# Patient Record
Sex: FEMALE | Race: WHITE | Employment: FULL TIME | URBAN - METROPOLITAN AREA
[De-identification: names, ages, dates, MRNs, and addresses within clinical notes are randomized per-mention and may not be internally consistent; named-entity substitution may affect disease eponyms.]

---

## 2017-04-18 ENCOUNTER — TELEPHONE (OUTPATIENT)
Dept: FAMILY MEDICINE CLINIC | Age: 52
End: 2017-04-18

## 2018-02-09 ENCOUNTER — OFFICE VISIT (OUTPATIENT)
Dept: FAMILY MEDICINE CLINIC | Age: 53
End: 2018-02-09

## 2018-02-09 VITALS
SYSTOLIC BLOOD PRESSURE: 108 MMHG | WEIGHT: 163.8 LBS | OXYGEN SATURATION: 96 % | BODY MASS INDEX: 30.95 KG/M2 | RESPIRATION RATE: 12 BRPM | DIASTOLIC BLOOD PRESSURE: 81 MMHG | HEART RATE: 90 BPM | TEMPERATURE: 97.1 F

## 2018-02-09 DIAGNOSIS — Z00.00 ROUTINE GENERAL MEDICAL EXAMINATION AT A HEALTH CARE FACILITY: ICD-10-CM

## 2018-02-09 DIAGNOSIS — F33.0 MAJOR DEPRESSIVE DISORDER, RECURRENT EPISODE, MILD (HCC): ICD-10-CM

## 2018-02-09 DIAGNOSIS — R13.10 DYSPHAGIA, UNSPECIFIED TYPE: ICD-10-CM

## 2018-02-09 DIAGNOSIS — Z12.11 SCREEN FOR COLON CANCER: ICD-10-CM

## 2018-02-09 DIAGNOSIS — J01.90 ACUTE BACTERIAL SINUSITIS: ICD-10-CM

## 2018-02-09 DIAGNOSIS — F90.0 ATTENTION DEFICIT HYPERACTIVITY DISORDER (ADHD), PREDOMINANTLY INATTENTIVE TYPE: Primary | ICD-10-CM

## 2018-02-09 DIAGNOSIS — B96.89 ACUTE BACTERIAL SINUSITIS: ICD-10-CM

## 2018-02-09 DIAGNOSIS — K21.9 GASTROESOPHAGEAL REFLUX DISEASE WITHOUT ESOPHAGITIS: ICD-10-CM

## 2018-02-09 DIAGNOSIS — N28.1 ACQUIRED CYST OF KIDNEY: ICD-10-CM

## 2018-02-09 DIAGNOSIS — Z12.39 SCREENING FOR BREAST CANCER: ICD-10-CM

## 2018-02-09 DIAGNOSIS — M51.36 DDD (DEGENERATIVE DISC DISEASE), LUMBAR: ICD-10-CM

## 2018-02-09 LAB
A/G RATIO: 1.8 (ref 1.1–2.2)
ALBUMIN SERPL-MCNC: 4.8 G/DL (ref 3.4–5)
ALP BLD-CCNC: 74 U/L (ref 40–129)
ALT SERPL-CCNC: 18 U/L (ref 10–40)
ANION GAP SERPL CALCULATED.3IONS-SCNC: 18 MMOL/L (ref 3–16)
AST SERPL-CCNC: 19 U/L (ref 15–37)
BILIRUB SERPL-MCNC: 0.5 MG/DL (ref 0–1)
BUN BLDV-MCNC: 16 MG/DL (ref 7–20)
CALCIUM SERPL-MCNC: 9.2 MG/DL (ref 8.3–10.6)
CHLORIDE BLD-SCNC: 99 MMOL/L (ref 99–110)
CHOLESTEROL, TOTAL: 202 MG/DL (ref 0–199)
CO2: 25 MMOL/L (ref 21–32)
CREAT SERPL-MCNC: 0.8 MG/DL (ref 0.6–1.1)
GFR AFRICAN AMERICAN: >60
GFR NON-AFRICAN AMERICAN: >60
GLOBULIN: 2.6 G/DL
GLUCOSE BLD-MCNC: 99 MG/DL (ref 70–99)
HCT VFR BLD CALC: 40.8 % (ref 36–48)
HDLC SERPL-MCNC: 63 MG/DL (ref 40–60)
HEMOGLOBIN: 13.5 G/DL (ref 12–16)
LDL CHOLESTEROL CALCULATED: 115 MG/DL
MCH RBC QN AUTO: 29.6 PG (ref 26–34)
MCHC RBC AUTO-ENTMCNC: 33 G/DL (ref 31–36)
MCV RBC AUTO: 89.6 FL (ref 80–100)
PDW BLD-RTO: 14.1 % (ref 12.4–15.4)
PLATELET # BLD: 250 K/UL (ref 135–450)
PMV BLD AUTO: 9.2 FL (ref 5–10.5)
POTASSIUM SERPL-SCNC: 4.1 MMOL/L (ref 3.5–5.1)
RBC # BLD: 4.55 M/UL (ref 4–5.2)
SODIUM BLD-SCNC: 142 MMOL/L (ref 136–145)
TOTAL PROTEIN: 7.4 G/DL (ref 6.4–8.2)
TRIGL SERPL-MCNC: 119 MG/DL (ref 0–150)
VLDLC SERPL CALC-MCNC: 24 MG/DL
WBC # BLD: 8.3 K/UL (ref 4–11)

## 2018-02-09 PROCEDURE — G8484 FLU IMMUNIZE NO ADMIN: HCPCS | Performed by: FAMILY MEDICINE

## 2018-02-09 PROCEDURE — 99214 OFFICE O/P EST MOD 30 MIN: CPT | Performed by: FAMILY MEDICINE

## 2018-02-09 PROCEDURE — G8427 DOCREV CUR MEDS BY ELIG CLIN: HCPCS | Performed by: FAMILY MEDICINE

## 2018-02-09 PROCEDURE — 3017F COLORECTAL CA SCREEN DOC REV: CPT | Performed by: FAMILY MEDICINE

## 2018-02-09 PROCEDURE — 1036F TOBACCO NON-USER: CPT | Performed by: FAMILY MEDICINE

## 2018-02-09 PROCEDURE — G8417 CALC BMI ABV UP PARAM F/U: HCPCS | Performed by: FAMILY MEDICINE

## 2018-02-09 PROCEDURE — 3014F SCREEN MAMMO DOC REV: CPT | Performed by: FAMILY MEDICINE

## 2018-02-09 RX ORDER — DOXYCYCLINE HYCLATE 100 MG
100 TABLET ORAL 2 TIMES DAILY
Qty: 20 TABLET | Refills: 0 | Status: SHIPPED | OUTPATIENT
Start: 2018-02-09 | End: 2018-02-19

## 2018-02-09 RX ORDER — DEXTROAMPHETAMINE SACCHARATE, AMPHETAMINE ASPARTATE MONOHYDRATE, DEXTROAMPHETAMINE SULFATE AND AMPHETAMINE SULFATE 7.5; 7.5; 7.5; 7.5 MG/1; MG/1; MG/1; MG/1
60 CAPSULE, EXTENDED RELEASE ORAL EVERY MORNING
Qty: 60 CAPSULE | Refills: 0 | Status: SHIPPED | OUTPATIENT
Start: 2018-02-09 | End: 2018-03-09 | Stop reason: SDUPTHER

## 2018-02-09 ASSESSMENT — PATIENT HEALTH QUESTIONNAIRE - PHQ9
SUM OF ALL RESPONSES TO PHQ QUESTIONS 1-9: 0
SUM OF ALL RESPONSES TO PHQ9 QUESTIONS 1 & 2: 0
2. FEELING DOWN, DEPRESSED OR HOPELESS: 0
1. LITTLE INTEREST OR PLEASURE IN DOING THINGS: 0

## 2018-02-09 NOTE — PROGRESS NOTES
Here for f/u, pt states that she has been feeling well overall. Pt has been off all her medications for about 1 year, due to lack of insurance. Pt has noted that being off meds, has not had any issues with mood being off meds. Pt states that with being in new job, stress levels are down and feels mood is doing great. Pt has noted that her ADD issues are significantly elevated off meds, and would like to look at options to resume therapy. Pt did do well with meds when she was on them, and would like to resume. Energy level is ok. No issues of chest pain, shortness of breath. Pt did go to ER a few weeks ago due to URI sx, bronchitis. Pt was evaluated and had full evaluation, was given breathing tx x 2. Did have CXR and CT chest that was normal.  Pt was given rx for prednisone taper, and zithromax. Pt only took zithromax and did not take steroids. Pt does feel better overall but with some mild persistent nasal congestion  No fever, and no sore throat. Breathing ok. No wheezing. Pt taking nothing except some over the counter mucinex. Pt does feel lung sx are better but with persistent nasal congestion, sinus congestion and PND. No fever, chills. 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 Pt really enjoys her job    Pt does have GERD sx, does use zantac and does find helpful. Pt denies any true dysphagia, but does have emesis with brusing teeth. Appetite ok, and bowels are normal.  No urinary sx. No fever, chills. No n/v. Except as noted above in the history of present illness, the review of systems is  negative for headache, vision changes, chest pain, shortness of breath, abdominal pain, urinary sx, bowel changes.     Past medical, surgical, and social history reviewed and updated  Medications and allergies reviewed and updated         O: /81   Pulse 90   Temp 97.1 °F (36.2 °C)   Resp 12   Wt 163 lb 12.8 oz (74.3 kg)   LMP meds  Monitor with symptomatic tx, and with tx ADD  F/u breakthru sx    4. Dysphagia, unspecified type  Exam nonfocal, sx intermittent  Will have pt set up referral to GI for eval, needs colonoscopy and recommend getting EGD at the same time  F/u increased sx and will consider swallow study  - [facility] - Heather Ferrer MD ([facility]); Future    5. Screen for colon cancer  Due colonoscopy  Agreeable to set up  [facility]    6. Acquired cyst of kidney  asymptomatic    7. Gastroesophageal reflux disease without esophagitis  Cont prn zantac and f/u increased sx  Await GI referra    8. Screening for breast cancer  - HEAVEN DIGITAL SCREEN W CAD BILATERAL; Future    9. Acute bacterial sinusitis  Doxy 100mg BID X 10d  Cont over the counter/symptomatic treatment. Follow up for persistent symptoms in 7 to 10 days or sooner for worsening symptomatology     10. Routine general medical examination at a health care facility  - CBC; Future  - Comprehensive Metabolic Panel; Future  - Lipid Panel; Future  - TSH without Reflex; Future           Follow-up appointment:   4wks/prn    Discussed use, benefit, and side effects of all prescribed medications. Barriers to medication compliance addressed. All patient questions answered. Pt voiced understanding. When applicable, patient's outside records were reviewed [facility]. The patient has signed appropriate paperworks/consents. Dragon dictation software was used for parts of this progress note. All attempts were made to correct any errors and ensure accuracy.

## 2018-02-10 LAB — TSH SERPL DL<=0.05 MIU/L-ACNC: 3.22 UIU/ML (ref 0.27–4.2)

## 2018-03-09 ENCOUNTER — OFFICE VISIT (OUTPATIENT)
Dept: FAMILY MEDICINE CLINIC | Age: 53
End: 2018-03-09

## 2018-03-09 VITALS
OXYGEN SATURATION: 95 % | TEMPERATURE: 97.9 F | DIASTOLIC BLOOD PRESSURE: 70 MMHG | WEIGHT: 166 LBS | SYSTOLIC BLOOD PRESSURE: 109 MMHG | RESPIRATION RATE: 20 BRPM | BODY MASS INDEX: 31.37 KG/M2 | HEART RATE: 84 BPM

## 2018-03-09 DIAGNOSIS — F90.0 ATTENTION DEFICIT HYPERACTIVITY DISORDER (ADHD), PREDOMINANTLY INATTENTIVE TYPE: ICD-10-CM

## 2018-03-09 DIAGNOSIS — Z12.11 SCREEN FOR COLON CANCER: ICD-10-CM

## 2018-03-09 DIAGNOSIS — R10.31 RIGHT INGUINAL PAIN: ICD-10-CM

## 2018-03-09 DIAGNOSIS — F33.0 MAJOR DEPRESSIVE DISORDER, RECURRENT EPISODE, MILD (HCC): Primary | ICD-10-CM

## 2018-03-09 DIAGNOSIS — M51.36 DDD (DEGENERATIVE DISC DISEASE), LUMBAR: ICD-10-CM

## 2018-03-09 DIAGNOSIS — Z12.39 SCREENING FOR BREAST CANCER: ICD-10-CM

## 2018-03-09 PROCEDURE — 99214 OFFICE O/P EST MOD 30 MIN: CPT | Performed by: FAMILY MEDICINE

## 2018-03-09 PROCEDURE — 3014F SCREEN MAMMO DOC REV: CPT | Performed by: FAMILY MEDICINE

## 2018-03-09 PROCEDURE — 1036F TOBACCO NON-USER: CPT | Performed by: FAMILY MEDICINE

## 2018-03-09 PROCEDURE — G8484 FLU IMMUNIZE NO ADMIN: HCPCS | Performed by: FAMILY MEDICINE

## 2018-03-09 PROCEDURE — 3017F COLORECTAL CA SCREEN DOC REV: CPT | Performed by: FAMILY MEDICINE

## 2018-03-09 PROCEDURE — G8427 DOCREV CUR MEDS BY ELIG CLIN: HCPCS | Performed by: FAMILY MEDICINE

## 2018-03-09 PROCEDURE — G8417 CALC BMI ABV UP PARAM F/U: HCPCS | Performed by: FAMILY MEDICINE

## 2018-03-09 RX ORDER — DEXTROAMPHETAMINE SACCHARATE, AMPHETAMINE ASPARTATE MONOHYDRATE, DEXTROAMPHETAMINE SULFATE AND AMPHETAMINE SULFATE 7.5; 7.5; 7.5; 7.5 MG/1; MG/1; MG/1; MG/1
60 CAPSULE, EXTENDED RELEASE ORAL EVERY MORNING
Qty: 60 CAPSULE | Refills: 0 | Status: SHIPPED | OUTPATIENT
Start: 2018-05-08 | End: 2018-06-21 | Stop reason: SDUPTHER

## 2018-03-09 RX ORDER — DEXTROAMPHETAMINE SACCHARATE, AMPHETAMINE ASPARTATE MONOHYDRATE, DEXTROAMPHETAMINE SULFATE AND AMPHETAMINE SULFATE 7.5; 7.5; 7.5; 7.5 MG/1; MG/1; MG/1; MG/1
60 CAPSULE, EXTENDED RELEASE ORAL EVERY MORNING
Qty: 60 CAPSULE | Refills: 0 | Status: SHIPPED | OUTPATIENT
Start: 2018-04-08 | End: 2018-06-21 | Stop reason: SDUPTHER

## 2018-03-09 RX ORDER — DEXTROAMPHETAMINE SACCHARATE, AMPHETAMINE ASPARTATE MONOHYDRATE, DEXTROAMPHETAMINE SULFATE AND AMPHETAMINE SULFATE 7.5; 7.5; 7.5; 7.5 MG/1; MG/1; MG/1; MG/1
60 CAPSULE, EXTENDED RELEASE ORAL EVERY MORNING
Qty: 60 CAPSULE | Refills: 0 | Status: SHIPPED | OUTPATIENT
Start: 2018-03-09 | End: 2018-06-21 | Stop reason: SDUPTHER

## 2018-03-09 RX ORDER — HYDROCODONE BITARTRATE AND ACETAMINOPHEN 7.5; 325 MG/1; MG/1
1 TABLET ORAL EVERY 6 HOURS PRN
Qty: 25 TABLET | Refills: 0 | Status: SHIPPED | OUTPATIENT
Start: 2018-03-09 | End: 2018-04-08

## 2018-03-09 RX ORDER — HYDROCODONE BITARTRATE AND ACETAMINOPHEN 7.5; 325 MG/1; MG/1
1 TABLET ORAL EVERY 6 HOURS PRN
Qty: 60 TABLET | Refills: 0 | Status: SHIPPED | OUTPATIENT
Start: 2018-03-09 | End: 2018-03-09 | Stop reason: SDUPTHER

## 2018-03-09 NOTE — PROGRESS NOTES
cancer  Has order for mammo, agreeable to get set up    5. Screen for colon cancer  Due colonoscopy  Working to get set up    6. Right inguinal pain  Exam nonfocal, mild lower abd pain  S/p FABIO/BSO, bowels normal  Await results of colonoscopy  Suspect muscular             Follow-up appointment:   3 mos/prn    Discussed use, benefit, and side effects of all prescribed medications. Barriers to medication compliance addressed. All patient questions answered. Pt voiced understanding. When applicable, patient's outside records were reviewed through [facility]. The patient has signed appropriate paperworks/consents. Dragon dictation software was used for parts of this progress note. All attempts were made to correct any errors and ensure accuracy.

## 2018-04-23 ENCOUNTER — HOSPITAL ENCOUNTER (OUTPATIENT)
Dept: MRI IMAGING | Age: 53
Discharge: HOME | End: 2018-04-23
Attending: FAMILY MEDICINE | Admitting: FAMILY MEDICINE

## 2018-04-23 DIAGNOSIS — M51.36 OTHER INTERVERTEBRAL DISC DEGENERATION, LUMBAR REGION: ICD-10-CM

## 2018-04-23 DIAGNOSIS — M51.36 DDD (DEGENERATIVE DISC DISEASE), LUMBAR: ICD-10-CM

## 2018-04-27 ENCOUNTER — TELEPHONE (OUTPATIENT)
Dept: FAMILY MEDICINE CLINIC | Age: 53
End: 2018-04-27

## 2018-05-02 ENCOUNTER — OFFICE VISIT (OUTPATIENT)
Dept: FAMILY MEDICINE CLINIC | Age: 53
End: 2018-05-02

## 2018-05-02 VITALS
WEIGHT: 161.4 LBS | DIASTOLIC BLOOD PRESSURE: 82 MMHG | HEART RATE: 97 BPM | SYSTOLIC BLOOD PRESSURE: 136 MMHG | OXYGEN SATURATION: 97 % | BODY MASS INDEX: 30.5 KG/M2 | TEMPERATURE: 99.2 F | RESPIRATION RATE: 16 BRPM

## 2018-05-02 DIAGNOSIS — R03.0 ELEVATED BLOOD PRESSURE READING: ICD-10-CM

## 2018-05-02 DIAGNOSIS — L65.9 ALOPECIA: ICD-10-CM

## 2018-05-02 DIAGNOSIS — K52.9 COLITIS, ACUTE: Primary | ICD-10-CM

## 2018-05-02 DIAGNOSIS — K92.1 HEMATOCHEZIA: ICD-10-CM

## 2018-05-02 DIAGNOSIS — M51.36 DDD (DEGENERATIVE DISC DISEASE), LUMBAR: ICD-10-CM

## 2018-05-02 PROCEDURE — 3017F COLORECTAL CA SCREEN DOC REV: CPT | Performed by: FAMILY MEDICINE

## 2018-05-02 PROCEDURE — G8427 DOCREV CUR MEDS BY ELIG CLIN: HCPCS | Performed by: FAMILY MEDICINE

## 2018-05-02 PROCEDURE — G8417 CALC BMI ABV UP PARAM F/U: HCPCS | Performed by: FAMILY MEDICINE

## 2018-05-02 PROCEDURE — 99214 OFFICE O/P EST MOD 30 MIN: CPT | Performed by: FAMILY MEDICINE

## 2018-05-02 PROCEDURE — 1036F TOBACCO NON-USER: CPT | Performed by: FAMILY MEDICINE

## 2018-06-21 ENCOUNTER — OFFICE VISIT (OUTPATIENT)
Dept: FAMILY MEDICINE CLINIC | Age: 53
End: 2018-06-21

## 2018-06-21 VITALS
DIASTOLIC BLOOD PRESSURE: 82 MMHG | RESPIRATION RATE: 16 BRPM | BODY MASS INDEX: 30.61 KG/M2 | WEIGHT: 162 LBS | HEART RATE: 60 BPM | SYSTOLIC BLOOD PRESSURE: 132 MMHG | OXYGEN SATURATION: 97 %

## 2018-06-21 DIAGNOSIS — F90.0 ATTENTION DEFICIT HYPERACTIVITY DISORDER (ADHD), PREDOMINANTLY INATTENTIVE TYPE: ICD-10-CM

## 2018-06-21 DIAGNOSIS — G89.29 CHRONIC LEFT-SIDED LOW BACK PAIN WITH LEFT-SIDED SCIATICA: Primary | ICD-10-CM

## 2018-06-21 DIAGNOSIS — Z12.39 SCREENING FOR BREAST CANCER: ICD-10-CM

## 2018-06-21 DIAGNOSIS — M47.816 SPONDYLOSIS OF LUMBAR REGION WITHOUT MYELOPATHY OR RADICULOPATHY: ICD-10-CM

## 2018-06-21 DIAGNOSIS — M54.42 CHRONIC LEFT-SIDED LOW BACK PAIN WITH LEFT-SIDED SCIATICA: Primary | ICD-10-CM

## 2018-06-21 DIAGNOSIS — F33.0 MAJOR DEPRESSIVE DISORDER, RECURRENT EPISODE, MILD (HCC): ICD-10-CM

## 2018-06-21 DIAGNOSIS — N28.1 ACQUIRED CYST OF KIDNEY: ICD-10-CM

## 2018-06-21 DIAGNOSIS — K52.9 COLITIS, ACUTE: ICD-10-CM

## 2018-06-21 PROCEDURE — G8417 CALC BMI ABV UP PARAM F/U: HCPCS | Performed by: FAMILY MEDICINE

## 2018-06-21 PROCEDURE — 3017F COLORECTAL CA SCREEN DOC REV: CPT | Performed by: FAMILY MEDICINE

## 2018-06-21 PROCEDURE — 99214 OFFICE O/P EST MOD 30 MIN: CPT | Performed by: FAMILY MEDICINE

## 2018-06-21 PROCEDURE — G8427 DOCREV CUR MEDS BY ELIG CLIN: HCPCS | Performed by: FAMILY MEDICINE

## 2018-06-21 PROCEDURE — 1036F TOBACCO NON-USER: CPT | Performed by: FAMILY MEDICINE

## 2018-06-21 RX ORDER — DEXTROAMPHETAMINE SACCHARATE, AMPHETAMINE ASPARTATE MONOHYDRATE, DEXTROAMPHETAMINE SULFATE AND AMPHETAMINE SULFATE 7.5; 7.5; 7.5; 7.5 MG/1; MG/1; MG/1; MG/1
60 CAPSULE, EXTENDED RELEASE ORAL EVERY MORNING
Qty: 60 CAPSULE | Refills: 0 | Status: SHIPPED | OUTPATIENT
Start: 2018-07-21 | End: 2018-11-05 | Stop reason: SDUPTHER

## 2018-06-21 RX ORDER — DEXTROAMPHETAMINE SACCHARATE, AMPHETAMINE ASPARTATE MONOHYDRATE, DEXTROAMPHETAMINE SULFATE AND AMPHETAMINE SULFATE 7.5; 7.5; 7.5; 7.5 MG/1; MG/1; MG/1; MG/1
60 CAPSULE, EXTENDED RELEASE ORAL EVERY MORNING
Qty: 60 CAPSULE | Refills: 0 | Status: SHIPPED | OUTPATIENT
Start: 2018-06-21 | End: 2018-11-05 | Stop reason: SDUPTHER

## 2018-06-21 RX ORDER — DEXTROAMPHETAMINE SACCHARATE, AMPHETAMINE ASPARTATE MONOHYDRATE, DEXTROAMPHETAMINE SULFATE AND AMPHETAMINE SULFATE 7.5; 7.5; 7.5; 7.5 MG/1; MG/1; MG/1; MG/1
60 CAPSULE, EXTENDED RELEASE ORAL EVERY MORNING
Qty: 60 CAPSULE | Refills: 0 | Status: SHIPPED | OUTPATIENT
Start: 2018-08-20 | End: 2018-11-05 | Stop reason: SDUPTHER

## 2018-06-22 ENCOUNTER — TELEPHONE (OUTPATIENT)
Dept: FAMILY MEDICINE CLINIC | Age: 53
End: 2018-06-22

## 2018-06-22 NOTE — TELEPHONE ENCOUNTER
Letter completed and placed at your workstation to be signed. Pt notified that she can pick it up Monday.

## 2018-06-27 ENCOUNTER — HOSPITAL ENCOUNTER (OUTPATIENT)
Dept: MAMMOGRAPHY | Age: 53
Discharge: HOME | End: 2018-06-27
Attending: FAMILY MEDICINE | Admitting: FAMILY MEDICINE

## 2018-06-27 DIAGNOSIS — Z12.31 VISIT FOR SCREENING MAMMOGRAM: ICD-10-CM

## 2018-06-28 ENCOUNTER — TELEPHONE (OUTPATIENT)
Dept: FAMILY MEDICINE CLINIC | Age: 53
End: 2018-06-28

## 2018-06-29 NOTE — TELEPHONE ENCOUNTER
Prior Authorization(s) completed and submitted on CMM. The insurance company should provide an automated phone call to the patient as well as fax a determination to the PA department.   (Key: [id number])

## 2018-07-05 ENCOUNTER — OFFICE VISIT (OUTPATIENT)
Dept: ORTHOPEDIC SURGERY | Age: 53
End: 2018-07-05

## 2018-07-05 VITALS
HEART RATE: 79 BPM | BODY MASS INDEX: 30.78 KG/M2 | HEIGHT: 61 IN | SYSTOLIC BLOOD PRESSURE: 128 MMHG | WEIGHT: 163 LBS | DIASTOLIC BLOOD PRESSURE: 89 MMHG

## 2018-07-05 DIAGNOSIS — M43.16 SPONDYLOLISTHESIS OF LUMBAR REGION: Primary | ICD-10-CM

## 2018-07-05 DIAGNOSIS — M47.816 SPONDYLOSIS OF LUMBAR REGION WITHOUT MYELOPATHY OR RADICULOPATHY: ICD-10-CM

## 2018-07-05 PROCEDURE — G8427 DOCREV CUR MEDS BY ELIG CLIN: HCPCS | Performed by: PHYSICAL MEDICINE & REHABILITATION

## 2018-07-05 PROCEDURE — 99244 OFF/OP CNSLTJ NEW/EST MOD 40: CPT | Performed by: PHYSICAL MEDICINE & REHABILITATION

## 2018-07-05 PROCEDURE — 3017F COLORECTAL CA SCREEN DOC REV: CPT | Performed by: PHYSICAL MEDICINE & REHABILITATION

## 2018-07-05 PROCEDURE — G8417 CALC BMI ABV UP PARAM F/U: HCPCS | Performed by: PHYSICAL MEDICINE & REHABILITATION

## 2018-07-05 RX ORDER — HYDROCODONE BITARTRATE AND ACETAMINOPHEN 5; 325 MG/1; MG/1
1 TABLET ORAL 2 TIMES DAILY PRN
Qty: 10 TABLET | Refills: 0 | Status: SHIPPED | OUTPATIENT
Start: 2018-07-05 | End: 2019-02-11 | Stop reason: SDUPTHER

## 2018-07-05 NOTE — PROGRESS NOTES
(ADDERALL XR) 30 MG extended release capsule, Take 2 capsules by mouth every morning for 30 days. Iraj Ramirez Date: 6/21/18, Disp: 60 capsule, Rfl: 0    VORTIoxetine (TRINTELLIX) 10 MG TABS tablet, Take 10 mg by mouth daily, Disp: 30 tablet, Rfl: 5    ondansetron (ZOFRAN ODT) 4 MG disintegrating tablet, Take 1 tablet by mouth every 8 hours as needed for Nausea, Disp: 12 tablet, Rfl: 0  Allergies:  Fioricet [butalbital-apap-caffeine]  Social History:    reports that she has never smoked. She has never used smokeless tobacco. She reports that she drinks alcohol. She reports that she does not use drugs. Family History:   History reviewed. No pertinent family history. REVIEW OF SYSTEMS: Full ROS noted & scanned          PHYSICAL EXAM:    Vitals: Blood pressure 128/89, pulse 79, height 5' 1\" (1.549 m), weight 163 lb (73.9 kg), not currently breastfeeding. GENERAL EXAM:  · General Apparence: Patient is adequately groomed with no evidence of malnutrition. · Psychiatric: Orientation: The patient is oriented to time, place and person. The patient's mood and affect are appropriate   · Vascular: Examination reveals no swelling and palpation reveals no tenderness in upper or lower extremities. Good capillary refill. · The lymphatic examination of the neck, axillae and groin reveals all areas to be without enlargement or induration   Sensation is intact without deficit in the upper and lower extremities to light touch and pinprick  · Coordination of the upper and lower extremities are normal.    CERVICAL EXAMINATION:  · Inspection: Local inspection shows no step-off or bruising. Cervical alignment is normal. No instability is noted. · Palpation and Percussion: No evidence of tenderness at the midline. Paraspinal tenderness is not present. There is no paraspinal spasm.   · Range of Motion:  limited by 25% in all planes due to pain   · Strength: 5/5 bilateral upper extremities  · Skin:There are no rashes, ulcerations or lesions. · Reflexes: Bilaterally triceps, biceps and brachioradialis are 2+. Clonus absent bilaterally at the feet. No pathological reflexes are noted. · Gait & station: normal, patient ambulates without assistance  · Additional Examinations:  · RIGHT UPPER EXTREMITY:  Inspection/examination of the right upper extremity does not show any tenderness, deformity or injury. Range of motion is normal and pain-free. There is no gross instability. There are no rashes, ulcerations or lesions. Strength and tone are normal. No atrophy or abnormal movements are noted. · LEFT UPPER EXTREMITY: Inspection/examination of the left upper extremity does not show any tenderness, deformity or injury. Range of motion is normal and pain-free. There is no gross instability. There are no rashes, ulcerations or lesions. Strength and tone are normal. No atrophy or abnormal movements are noted. LUMBAR/SACRAL EXAMINATION:  · Inspection: Local inspection shows no step-off or bruising. Lumbar alignment is normal. No instability is noted. · Palpation:   No evidence of tenderness at the midline. No tenderness bilaterally at the paraspinal or trochanters. There is no paraspinal spasm. · Range of Motion: limited by 50% in all planes due to pain  · Strength:   Strength testing is 5/5 in all muscle groups tested. · Special Tests:   Straight leg raise and crossed SLR negative  · Skin: There are no rashes, ulcerations or lesions. · Reflexes: Reflexes are symmetrically 1+ at the patellar and ankle tendons. Clonus absent bilaterally at the feet. · Gait & station: normal, patient ambulates without assistance  · Additional Examinations:  · RIGHT LOWER EXTREMITY: Inspection/examination of the right lower extremity does not show any tenderness, deformity or injury. Range of motion is unremarkable. There is no gross instability. There are no rashes, ulcerations or lesions.  Strength and tone are normal. No atrophy or abnormal movements are noted. · LEFT LOWER EXTREMITY:  Inspection/examination of the left lower extremity does not show any tenderness, deformity or injury. Range of motion is unremarkable. There is no gross instability. There are no rashes, ulcerations or lesions. Strength and tone are normal. No atrophy or abnormal movements are noted.       Diagnostic Testing:    Xrays:   None  MRI or CT:  MR Lumbar 4/23/18 shows multilevel lumbar spondylosis with grade 1 L4-L5 anterolisthesis and severe facet arthropathy  EMG:  None  Results for orders placed or performed during the hospital encounter of 06/25/18   Troponin   Result Value Ref Range    Troponin <0.01 <0.01 ng/mL   CBC auto differential   Result Value Ref Range    WBC 11.7 (H) 4.0 - 11.0 K/uL    RBC 4.90 4.00 - 5.20 M/uL    Hemoglobin 14.5 12.0 - 16.0 g/dL    Hematocrit 48.7 (H) 36.0 - 48.0 %    MCV 99.4 80.0 - 100.0 fL    MCH 29.5 26.0 - 34.0 pg    MCHC 29.7 (L) 31.0 - 36.0 g/dL    RDW 15.5 (H) 12.4 - 15.4 %    Platelets 974 622 - 048 K/uL    MPV 8.7 5.0 - 10.5 fL    Neutrophils % 77.0 %    Lymphocytes % 18.0 %    Monocytes % 3.0 %    Eosinophils % 1.0 %    Basophils % 1.0 %    Neutrophils # 9.0 (H) 1.7 - 7.7 K/uL    Lymphocytes # 2.1 1.0 - 5.1 K/uL    Monocytes # 0.4 0.0 - 1.3 K/uL    Eosinophils # 0.1 0.0 - 0.6 K/uL    Basophils # 0.1 0.0 - 0.2 K/uL    RBC Morphology Normal    Comprehensive Metabolic Panel w/ Reflex to MG   Result Value Ref Range    Sodium 144 136 - 145 mmol/L    Potassium reflex Magnesium 4.2 3.5 - 5.1 mmol/L    Chloride 105 99 - 110 mmol/L    CO2 22 21 - 32 mmol/L    Anion Gap 17 (H) 3 - 16    Glucose 106 (H) 70 - 99 mg/dL    BUN 18 7 - 20 mg/dL    CREATININE 0.7 0.6 - 1.1 mg/dL    GFR Non-African American >60 >60    GFR African American >60 >60    Calcium 9.4 8.3 - 10.6 mg/dL    Total Protein 7.5 6.4 - 8.2 g/dL    Alb 4.4 3.4 - 5.0 g/dL    Albumin/Globulin Ratio 1.4 1.1 - 2.2    Total Bilirubin 0.3 0.0 - 1.0 mg/dL    Alkaline Phosphatase 91 40 - 129 U/L    ALT 16 10 - 40 U/L    AST 17 15 - 37 U/L    Globulin 3.1 g/dL   Lipase   Result Value Ref Range    Lipase 44.0 13.0 - 60.0 U/L   Troponin (lab)   Result Value Ref Range    Troponin <0.01 <0.01 ng/mL   EKG 12 Lead   Result Value Ref Range    Ventricular Rate 68 BPM    Atrial Rate 68 BPM    P-R Interval 148 ms    QRS Duration 82 ms    Q-T Interval 422 ms    QTc Calculation (Bazett) 448 ms    P Axis 37 degrees    R Axis -22 degrees    T Axis 25 degrees    Diagnosis       EKG performed in ER and to be interpreted by ER physician. Confirmed by MD, ER ([id number]),  Harolyn Sacks ([id number]) on 6/25/2018 9:37:08 AM         Impression (Medical Decision Making):       1. Spondylolisthesis of lumbar region    2. Spondylosis of lumbar region without myelopathy or radiculopathy        Plan (Medical Decision Making):    1. Medications:  . OARRS reviewed and appropriate. Low risk on ORT. Norco 5/325 mg po bid#10  Informed verbal consent was obtained. Goals of the current treatment regimen include: improvement in pain, improvement in overall in physical performance, ability to perform daily activities, work or disability, emotional distress, health care utilization and decreased medication consumption. Progress will be monitored towards achieving/maintaining therapeutic goals:    1. Improving perceived interference of pain with ADL's, ability to perform HEP, continue to improve in overall flexibility, ROM, strength and endurance, ability to do household activities indoor and/or outdoor, work and social/leisure activities. Improve psychosocial and physical functioning. 2. Improving sleep to 6-7 hours a night. Improve mood/ anxiety and depression symptoms    3. Reduction of reliance on opioid analgesia/more appropriate opioid use. Utilization of adjuvant medications as appropriate. Risks and benefits of the medications and alternative treatments have been discussed with the patient.  The patient was advised against drinking alcohol with the opioid pain medicines, advised against driving or handling machinery when starting or adjusting the dose of medicines, feeling groggy or drowsy, or if having any cognitive issues related to the current medications. The patient is fully aware of the risk of overdose and death, if medicines are misused and not taken as prescribed. If the patient develops new symptoms or if the symptoms worsen, the patient was told to call the office. 2. PT:  Encouraged to continue with HEP. 3. Further studies:  No further testing  4. Interventional:  We discussed pursuing a L4/5 IL epidural steroid injection to address the pain. Radiologic imaging and symptoms confirm the pain etiology. Risks, benefits and alternatives of interventional options were discussed. These include and are not limited to bleeding, infection, spinal headache, nerve injury, increased pain and lack of pain relief. The patient verbalized understanding and would like to proceed. The patient will be scheduled accordingly. 5. Healthy Lifestyle Measures:  Patient education material - Anatomic drawings, healthy lifestyle education, osteoporosis prevention, back and neck pain educational information, and advanced imaging preparedness were distributed to the patient. Posture education, proper lifting and carrying techniques, weight control, quitting smoking and minor ways to treat back pain were reviewed and distributed. For further information regarding the spine conditions and to review interventional treatments the patient was directed to [employer].  6.  Follow up:  4-6 weeks        Anahi Anthony MD, JOE, [facility]  Board Certified in [address] [place] [address] Certified and Fellowship Trained in [facility] ([facility])     This dictation was performed with a verbal recognition program [facility]) and it was checked for errors.  It is possible that there

## 2018-07-17 ENCOUNTER — HOSPITAL ENCOUNTER (OUTPATIENT)
Age: 53
Setting detail: OUTPATIENT SURGERY
Discharge: HOME | End: 2018-07-17
Attending: INTERNAL MEDICINE | Admitting: INTERNAL MEDICINE
Payer: COMMERCIAL

## 2018-07-17 ENCOUNTER — ANESTHESIA EVENT (OUTPATIENT)
Dept: ENDOSCOPY | Age: 53
End: 2018-07-17
Payer: COMMERCIAL

## 2018-07-17 ENCOUNTER — ANESTHESIA (OUTPATIENT)
Dept: ENDOSCOPY | Age: 53
End: 2018-07-17
Payer: COMMERCIAL

## 2018-07-17 VITALS — SYSTOLIC BLOOD PRESSURE: 103 MMHG | DIASTOLIC BLOOD PRESSURE: 69 MMHG | OXYGEN SATURATION: 92 %

## 2018-07-17 VITALS
WEIGHT: 164 LBS | OXYGEN SATURATION: 99 % | HEIGHT: 61 IN | DIASTOLIC BLOOD PRESSURE: 79 MMHG | TEMPERATURE: 48.2 F | BODY MASS INDEX: 30.96 KG/M2 | RESPIRATION RATE: 18 BRPM | HEART RATE: 72 BPM | SYSTOLIC BLOOD PRESSURE: 123 MMHG

## 2018-07-17 PROCEDURE — 3700000001 HC ADD 15 MINUTES (ANESTHESIA): Performed by: INTERNAL MEDICINE

## 2018-07-17 PROCEDURE — 2580000003 HC RX 258: Performed by: NURSE ANESTHETIST, CERTIFIED REGISTERED

## 2018-07-17 PROCEDURE — 3700000000 HC ANESTHESIA ATTENDED CARE: Performed by: INTERNAL MEDICINE

## 2018-07-17 PROCEDURE — 3609010300 HC COLONOSCOPY W/BIOPSY SINGLE/MULTIPLE: Performed by: INTERNAL MEDICINE

## 2018-07-17 PROCEDURE — 7100000010 HC PHASE II RECOVERY - FIRST 15 MIN: Performed by: INTERNAL MEDICINE

## 2018-07-17 PROCEDURE — 7100000011 HC PHASE II RECOVERY - ADDTL 15 MIN: Performed by: INTERNAL MEDICINE

## 2018-07-17 PROCEDURE — 88305 TISSUE EXAM BY PATHOLOGIST: CPT

## 2018-07-17 PROCEDURE — 2709999900 HC NON-CHARGEABLE SUPPLY: Performed by: INTERNAL MEDICINE

## 2018-07-17 PROCEDURE — 6360000002 HC RX W HCPCS: Performed by: NURSE ANESTHETIST, CERTIFIED REGISTERED

## 2018-07-17 PROCEDURE — 3609009500 HC COLONOSCOPY DIAGNOSTIC OR SCREENING: Performed by: INTERNAL MEDICINE

## 2018-07-17 RX ORDER — PROPOFOL 10 MG/ML
INJECTION, EMULSION INTRAVENOUS PRN
Status: DISCONTINUED | OUTPATIENT
Start: 2018-07-17 | End: 2018-07-17 | Stop reason: SDUPTHER

## 2018-07-17 RX ORDER — SODIUM CHLORIDE, SODIUM LACTATE, POTASSIUM CHLORIDE, CALCIUM CHLORIDE 600; 310; 30; 20 MG/100ML; MG/100ML; MG/100ML; MG/100ML
INJECTION, SOLUTION INTRAVENOUS CONTINUOUS PRN
Status: DISCONTINUED | OUTPATIENT
Start: 2018-07-17 | End: 2018-07-17 | Stop reason: SDUPTHER

## 2018-07-17 RX ADMIN — PROPOFOL 50 MG: 10 INJECTION, EMULSION INTRAVENOUS at 08:10

## 2018-07-17 RX ADMIN — PROPOFOL 50 MG: 10 INJECTION, EMULSION INTRAVENOUS at 08:09

## 2018-07-17 RX ADMIN — PROPOFOL 50 MG: 10 INJECTION, EMULSION INTRAVENOUS at 08:05

## 2018-07-17 RX ADMIN — SODIUM CHLORIDE, SODIUM LACTATE, POTASSIUM CHLORIDE, AND CALCIUM CHLORIDE: 600; 310; 30; 20 INJECTION, SOLUTION INTRAVENOUS at 07:58

## 2018-07-17 RX ADMIN — PROPOFOL 50 MG: 10 INJECTION, EMULSION INTRAVENOUS at 08:16

## 2018-07-17 RX ADMIN — PROPOFOL 50 MG: 10 INJECTION, EMULSION INTRAVENOUS at 08:01

## 2018-07-17 RX ADMIN — PROPOFOL 50 MG: 10 INJECTION, EMULSION INTRAVENOUS at 08:00

## 2018-07-17 RX ADMIN — PROPOFOL 50 MG: 10 INJECTION, EMULSION INTRAVENOUS at 08:20

## 2018-07-17 RX ADMIN — PROPOFOL 50 MG: 10 INJECTION, EMULSION INTRAVENOUS at 08:13

## 2018-07-17 RX ADMIN — PROPOFOL 50 MG: 10 INJECTION, EMULSION INTRAVENOUS at 08:18

## 2018-07-17 ASSESSMENT — PULMONARY FUNCTION TESTS
PIF_VALUE: 0
PIF_VALUE: 1
PIF_VALUE: 0
PIF_VALUE: 1
PIF_VALUE: 0

## 2018-07-17 NOTE — ANESTHESIA POSTPROCEDURE EVALUATION
Department of Anesthesiology  Postprocedure Note    Patient: Lily Malik  MRN: [id number]  YOB: 1965  Date of evaluation: 7/17/2018  Time:  8:49 AM     Procedure Summary     Date:  07/17/18 Room / Location:  [facility] ENDO 02 / [facility] ENDOSCOPY    Anesthesia Start:  0758 Anesthesia Stop:  Jose C James    Procedures:       COLONOSCOPY WITH MAC (N/A )      COLONOSCOPY WITH BIOPSY Diagnosis:  (Colon Cancer Screening Z12.11)    Surgeon:  Christofer King MD Responsible Provider:  Terry Abarca MD    Anesthesia Type:  MAC ASA Status:  3          Anesthesia Type: MAC    Evelio Phase I:      Evelio Phase II:      Last vitals: Reviewed and per EMR flowsheets.        Anesthesia Post Evaluation    Patient location during evaluation: bedside  Patient participation: complete - patient participated  Level of consciousness: awake and alert  Airway patency: patent  Nausea & Vomiting: no vomiting  Complications: no  Cardiovascular status: blood pressure returned to baseline  Respiratory status: acceptable  Hydration status: euvolemic

## 2018-07-17 NOTE — ANESTHESIA PRE PROCEDURE
Department of Anesthesiology  Preprocedure Note       Name:  Bao Moses   Age:  47 y.o.  :  1965                                          MRN:  [id number]         Date:  2018      Surgeon: Vera Bruce):  Vida Burkitt, MD    Procedure: Procedure(s):  COLONOSCOPY WITH MAC    Medications prior to admission:   Prior to Admission medications    Medication Sig Start Date End Date Taking? Authorizing Provider   pantoprazole (PROTONIX) 20 MG tablet Take 2 tablets by mouth daily 18   Kym Ballesteros MD   sucralfate (CARAFATE) 1 GM tablet Take 1 tablet by mouth 4 times daily 18   Kym Ballesteros MD   amphetamine-dextroamphetamine (ADDERALL XR) 30 MG extended release capsule Take 2 capsules by mouth every morning for 30 days. Atl Grace Date: 18  Artemio Bedolla MD   amphetamine-dextroamphetamine (ADDERALL XR) 30 MG extended release capsule Take 2 capsules by mouth every morning for 30 days.  Grace Date: 18  Artemio Bedolla MD   amphetamine-dextroamphetamine (ADDERALL XR) 30 MG extended release capsule Take 2 capsules by mouth every morning for 30 days. Atl Grace Date: 18  Artemio Bedolla MD   VORTIoxetine (TRINTELLIX) 10 MG TABS tablet Take 10 mg by mouth daily 18   Artemio Bedolla MD   ondansetron (ZOFRAN ODT) 4 MG disintegrating tablet Take 1 tablet by mouth every 8 hours as needed for Nausea 18   PHANI Meyer       Current medications:    No current facility-administered medications for this encounter. Allergies: Allergies   Allergen Reactions    Fioricet [Butalbital-Apap-Caffeine] Other (See Comments)     Dizziness.        Problem List:    Patient Active Problem List   Diagnosis Code    Major depressive disorder, recurrent episode, mild (HCC) F33.0    Other specified cardiac dysrhythmias I49.8    Migraine G43.909    Palpitations R00.2    Lumbago M54.5    Irritable bowel

## 2018-07-19 ENCOUNTER — TELEPHONE (OUTPATIENT)
Dept: ORTHOPEDIC SURGERY | Age: 53
End: 2018-07-19

## 2018-07-31 ENCOUNTER — TELEPHONE (OUTPATIENT)
Dept: ORTHOPEDIC SURGERY | Age: 53
End: 2018-07-31

## 2018-08-08 ENCOUNTER — HOSPITAL ENCOUNTER (OUTPATIENT)
Dept: PAIN MANAGEMENT | Age: 53
Discharge: HOME | End: 2018-08-08
Admitting: PHYSICAL MEDICINE & REHABILITATION

## 2018-08-08 VITALS
TEMPERATURE: 97.5 F | SYSTOLIC BLOOD PRESSURE: 122 MMHG | RESPIRATION RATE: 16 BRPM | OXYGEN SATURATION: 97 % | HEART RATE: 84 BPM | DIASTOLIC BLOOD PRESSURE: 93 MMHG

## 2018-08-08 LAB
INR BLD: 0.96 (ref 0.86–1.14)
PROTHROMBIN TIME: 11 SEC (ref 9.8–13)

## 2018-08-08 ASSESSMENT — PAIN - FUNCTIONAL ASSESSMENT: PAIN_FUNCTIONAL_ASSESSMENT: 0-10

## 2018-08-08 ASSESSMENT — PAIN DESCRIPTION - DESCRIPTORS: DESCRIPTORS: THROBBING

## 2018-08-08 NOTE — PROGRESS NOTES
Pain Therapy Intra-procedural Note CAUDAL interlaminar     Position: Prone   Site marked/ confirmed: Yes   Prepped with chloraprep      Local :Lidocaine 1%      Depomedrol: 80mg /ml    Saline 0.9%       Monitoring: RUDDY KOVACS RN              C arm :RUDDY GARCIA RT   Circulator: EMERALD FABIAN RN               Prepped by: Joyce Reyes MD

## 2018-08-08 NOTE — PROGRESS NOTES
IV discontinued, catheter intact, and dressing applied. Procedural dressing dry and intact. Bilateral lower extremities equal in strength. Discharge instructions reviewed with patient or responsible adult, signed and copy given. All home medications have been reviewed. All questions answered and patient or responsible adult verbalized understanding.   PAIN LEVEL AT DISCHARGE _6____

## 2018-08-27 ENCOUNTER — OFFICE VISIT (OUTPATIENT)
Dept: ORTHOPEDIC SURGERY | Age: 53
End: 2018-08-27

## 2018-08-27 VITALS — HEIGHT: 61 IN | WEIGHT: 163 LBS | BODY MASS INDEX: 30.78 KG/M2

## 2018-08-27 DIAGNOSIS — M51.36 DDD (DEGENERATIVE DISC DISEASE), LUMBAR: ICD-10-CM

## 2018-08-27 DIAGNOSIS — M47.816 SPONDYLOSIS OF LUMBAR REGION WITHOUT MYELOPATHY OR RADICULOPATHY: ICD-10-CM

## 2018-08-27 DIAGNOSIS — M43.16 SPONDYLOLISTHESIS OF LUMBAR REGION: Primary | ICD-10-CM

## 2018-08-27 PROCEDURE — G8427 DOCREV CUR MEDS BY ELIG CLIN: HCPCS | Performed by: PHYSICAL MEDICINE & REHABILITATION

## 2018-08-27 PROCEDURE — 1036F TOBACCO NON-USER: CPT | Performed by: PHYSICAL MEDICINE & REHABILITATION

## 2018-08-27 PROCEDURE — 99213 OFFICE O/P EST LOW 20 MIN: CPT | Performed by: PHYSICAL MEDICINE & REHABILITATION

## 2018-08-27 PROCEDURE — 3017F COLORECTAL CA SCREEN DOC REV: CPT | Performed by: PHYSICAL MEDICINE & REHABILITATION

## 2018-08-27 PROCEDURE — G8417 CALC BMI ABV UP PARAM F/U: HCPCS | Performed by: PHYSICAL MEDICINE & REHABILITATION

## 2018-08-27 NOTE — PROGRESS NOTES
Follow up: Delta Odor  1965        CHIEF COMPLAINT:    Chief Complaint   Patient presents with    Back Pain     F/U L5-S1 IL 8/8; Patient states she got great improvement from the injection. She states she was in a lot of pain a few days after but is doing much better,         HISTORY OF PRESENT ILLNESS:  Ms. Bong Baca is a 47 y.o. female returns for a follow up visit for multiple medical problems. Her current presenting problems are   1. Spondylolisthesis of lumbar region    2. Spondylosis of lumbar region without myelopathy or radiculopathy    3. DDD (degenerative disc disease), lumbar    . As per information/history obtained from the PADT(patient assessment and documentation tool) - She complains of pain in the lower back. She rates the pain 4/10 and describes it as dull. Pain is made worse by: lifting and bending. She denies side effects from the current pain regimen. Patient reports that since the last follow up visit the physical functioning is better, family/social relationships are better, mood is better and sleep patterns are better, and that the overall functioning is better. Patient denies neurological bowel or bladder. She returns after undergoing an L5-S1 interlaminar approach epidural steroid injection on 8/8/2018. She is had 90% pain relief. Functionally she is performing most of her activities any problems. She denies having any leg pain or any significant back pain. 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 She does report some tugging at that time.   Overall though she is happy with her pain relief        Associated signs and symptoms:   Neurogenic bowel or bladder symptoms:  no   Perceived weakness:  no   Difficulty walking:  no              Past Medical History:   Past Medical History:   Diagnosis Date    Acid reflux     Acquired cyst of kidney     ADHD (attention deficit hyperactivity disorder) 10/1/2013    DDD (degenerative disc disease), lumbar 2013    Depressive disorder, not elsewhere classified 2010    Hiatal hernia 3/7/2016    History of loop recorder placed 2 years ago    Irritable bowel syndrome 2010    Migraine, unspecified, without mention of intractable migraine without mention of status migrainosus 2010    Palpitations 2010    Pancreatitis     Pneumonia childhood    Reflux esophagitis 2010      Past Surgical History:     Past Surgical History:   Procedure Laterality Date    ABLATION OF DYSRHYTHMIC FOCUS      APPENDECTOMY       SECTION       SECTION Bilateral     COLONOSCOPY  2018    COLONOSCOPY WITH BIOPSY performed by Brittani Chan MD at [address] [place] [address]      laparoscopic    OVARY REMOVAL      bilateral    CA COLONOSCOPY FLX DX W/COLLJ SPEC WHEN PFRMD N/A 2018    COLONOSCOPY WITH MAC performed by Brittani Chan MD at [facility] ENDOSCOPY     Current Medications:     Current Outpatient Prescriptions:     amphetamine-dextroamphetamine (ADDERALL XR) 30 MG extended release capsule, Take 2 capsules by mouth every morning for 30 days. Linda Cody Date: 18, Disp: 60 capsule, Rfl: 0    amphetamine-dextroamphetamine (ADDERALL XR) 30 MG extended release capsule, Take 2 capsules by mouth every morning for 30 days. Linda Cody Date: 18, Disp: 60 capsule, Rfl: 0    amphetamine-dextroamphetamine (ADDERALL XR) 30 MG extended release capsule, Take 2 capsules by mouth every morning for 30 days. Linda Cody Date: 18, Disp: 60 capsule, Rfl: 0    VORTIoxetine (TRINTELLIX) 10 MG TABS tablet, Take 10 mg by mouth daily, Disp: 30 tablet, Rfl: 5  Allergies:  Fioricet [butalbital-apap-caffeine]  Social History:    reports that she has never smoked. She has never used smokeless tobacco. She reports that she drinks about 0.6 oz of alcohol per week .  She reports that she does not use drugs. Family History:   History reviewed. No pertinent family history. REVIEW OF SYSTEMS:   CONSTITUTIONAL: Denies unexplained weight loss, fevers, chills or fatigue  NEUROLOGICAL: Denies unsteady gait or progressive weakness  MUSCULOSKELETAL: Denies joint swelling or redness  GI: Denies nausea, vomiting, diarrhea   : Denies bowel or bladder issues       PHYSICAL EXAM:    Vitals: Height 5' 1\" (1.549 m), weight 163 lb (73.9 kg), not currently breastfeeding. GENERAL EXAM:  · General Apparence: Patient is adequately groomed with no evidence of malnutrition. · Psychiatric: Orientation: The patient is oriented to time, place and person. The patient's mood and affect are appropriate   · Vascular: Examination reveals no swelling and palpation reveals no tenderness in upper or lower extremities. Good capillary refill. · Sensation is intact without deficit in the upper and lower extremities to light touch and pinprick  · Coordination of the upper and lower extremities are normal.  ·   LUMBAR/SACRAL EXAMINATION:  · Inspection: Local inspection shows no step-off or bruising. Lumbar alignment is normal. No instability is noted. · Palpation:   No evidence of tenderness at the midline. No tenderness bilaterally at the paraspinal or trochanters. There is no paraspinal spasm. · Range of Motion: pain-free ROM  · Strength:   Strength testing is 5/5 in all muscle groups tested. · Special Tests:   Straight leg raise and crossed SLR negative. Tuan's testing is negative bilaterally. FADIR's testing is negative bilaterally. · Skin: There are no rashes, ulcerations or lesions. · Reflexes: Reflexes are symmetrically 2+ at the patellar and ankle tendons. Clonus absent bilaterally at the feet.   · Gait & station: normal, patient ambulates without assistance  · Additional Examinations:  · RIGHT LOWER EXTREMITY: Inspection/examination of the right lower extremity does not show any tenderness, errors to my attention for an addendum. All efforts were made to ensure that this office note is accurate.

## 2018-11-05 ENCOUNTER — OFFICE VISIT (OUTPATIENT)
Dept: FAMILY MEDICINE CLINIC | Age: 53
End: 2018-11-05
Payer: COMMERCIAL

## 2018-11-05 VITALS
HEIGHT: 60 IN | RESPIRATION RATE: 12 BRPM | TEMPERATURE: 98.7 F | HEART RATE: 90 BPM | WEIGHT: 168.2 LBS | OXYGEN SATURATION: 95 % | DIASTOLIC BLOOD PRESSURE: 82 MMHG | SYSTOLIC BLOOD PRESSURE: 121 MMHG | BODY MASS INDEX: 33.02 KG/M2

## 2018-11-05 DIAGNOSIS — K44.9 HIATAL HERNIA: ICD-10-CM

## 2018-11-05 DIAGNOSIS — K21.00 REFLUX ESOPHAGITIS: ICD-10-CM

## 2018-11-05 DIAGNOSIS — J06.9 ACUTE URI: ICD-10-CM

## 2018-11-05 DIAGNOSIS — F90.0 ATTENTION DEFICIT HYPERACTIVITY DISORDER (ADHD), PREDOMINANTLY INATTENTIVE TYPE: ICD-10-CM

## 2018-11-05 DIAGNOSIS — R03.0 ELEVATED BLOOD PRESSURE READING: ICD-10-CM

## 2018-11-05 DIAGNOSIS — I83.93 ASYMPTOMATIC VARICOSE VEINS OF BOTH LOWER EXTREMITIES: ICD-10-CM

## 2018-11-05 DIAGNOSIS — K58.9 IRRITABLE BOWEL SYNDROME, UNSPECIFIED TYPE: ICD-10-CM

## 2018-11-05 DIAGNOSIS — Z00.00 ROUTINE GENERAL MEDICAL EXAMINATION AT A HEALTH CARE FACILITY: Primary | ICD-10-CM

## 2018-11-05 PROCEDURE — G8484 FLU IMMUNIZE NO ADMIN: HCPCS | Performed by: FAMILY MEDICINE

## 2018-11-05 PROCEDURE — 99396 PREV VISIT EST AGE 40-64: CPT | Performed by: FAMILY MEDICINE

## 2018-11-05 RX ORDER — DEXTROAMPHETAMINE SACCHARATE, AMPHETAMINE ASPARTATE MONOHYDRATE, DEXTROAMPHETAMINE SULFATE AND AMPHETAMINE SULFATE 7.5; 7.5; 7.5; 7.5 MG/1; MG/1; MG/1; MG/1
60 CAPSULE, EXTENDED RELEASE ORAL EVERY MORNING
Qty: 60 CAPSULE | Refills: 0 | Status: SHIPPED | OUTPATIENT
Start: 2019-01-04 | End: 2019-02-11 | Stop reason: SDUPTHER

## 2018-11-05 RX ORDER — DEXTROAMPHETAMINE SACCHARATE, AMPHETAMINE ASPARTATE MONOHYDRATE, DEXTROAMPHETAMINE SULFATE AND AMPHETAMINE SULFATE 7.5; 7.5; 7.5; 7.5 MG/1; MG/1; MG/1; MG/1
60 CAPSULE, EXTENDED RELEASE ORAL EVERY MORNING
Qty: 60 CAPSULE | Refills: 0 | Status: SHIPPED | OUTPATIENT
Start: 2018-11-05 | End: 2019-02-11 | Stop reason: SDUPTHER

## 2018-11-05 RX ORDER — DEXTROAMPHETAMINE SACCHARATE, AMPHETAMINE ASPARTATE MONOHYDRATE, DEXTROAMPHETAMINE SULFATE AND AMPHETAMINE SULFATE 7.5; 7.5; 7.5; 7.5 MG/1; MG/1; MG/1; MG/1
60 CAPSULE, EXTENDED RELEASE ORAL EVERY MORNING
Qty: 60 CAPSULE | Refills: 0 | Status: SHIPPED | OUTPATIENT
Start: 2018-12-05 | End: 2019-02-11 | Stop reason: SDUPTHER

## 2018-11-05 RX ORDER — OMEPRAZOLE 40 MG/1
40 CAPSULE, DELAYED RELEASE ORAL DAILY
Qty: 30 CAPSULE | Refills: 5 | Status: SHIPPED | OUTPATIENT
Start: 2018-11-05 | End: 2019-04-09

## 2018-11-05 RX ORDER — AZITHROMYCIN 250 MG/1
TABLET, FILM COATED ORAL
Qty: 1 PACKET | Refills: 0 | Status: SHIPPED | OUTPATIENT
Start: 2018-11-05 | End: 2018-11-09

## 2018-11-05 NOTE — PROGRESS NOTES
121/82   Pulse 90   Temp 98.7 °F (37.1 °C)   Resp 12   Ht 5' 0.2\" (1.529 m)   Wt 168 lb 3.2 oz (76.3 kg)   LMP  (Exact Date)   SpO2 95%   Breastfeeding? No   BMI 32.63 kg/m²   General appearance - healthy, alert, no distress  Skin - Skin color, texture, turgor normal. No rashes or lesions. No suspicious findings  Head - Normocephalic. No masses, lesions, tenderness or abnormalities  Eyes - conjunctivae/corneas clear. Pupils equal and reactive to light and accomodation, extraocular muscles intact. Ears - External ears normal. Canals clear. Tympanic membranes normal bilaterally. Nose/Sinuses - Nares normal. Septum midline. Mucosa normal. No drainage or sinus tenderness. Oropharynx - Lips, mucosa, and tongue normal. Teeth and gums normal.   Neck - Neck supple. No adenopathy. Thyroid symmetric, normal size, no carotid bruit bilaterally  Back - Back symmetric, no curvature. Range of motion normal. No Costovertebral angle tenderness. Lungs - Percussion normal. Good diaphragmatic excursion. Lungs clear without wheeze, rales, crackles  Heart - Regular rate and rhythm, with no rub, murmur or gallop noted. Abdomen - Abdomen soft, non-tender. Bowel sounds normal. No masses, tenderness or organomegaly  Extremities - Extremities normal. No deformities, edema, or skin discoloration  Musculoskeletal - Spine ROM normal. Muscular strength intact. Peripheral pulses - radial=4/4,, femoral=4/4, popliteal=4/4, dorsalis pedis=4/4,  Neuro - Gait normal. Reflexes normal and symmetric. Sensation grossly normal.  No focal weakness  Psych - euthymic, no suicidal thoughts or ideation, mood stable. Current Outpatient Prescriptions   Medication Sig Dispense Refill    [START ON 1/4/2019] amphetamine-dextroamphetamine (ADDERALL XR) 30 MG extended release capsule Take 2 capsules by mouth every morning for 30 days. [employer] Date: 1/4/19 60 capsule 0    [START ON 12/5/2018] amphetamine-dextroamphetamine (ADDERALL XR)

## 2018-11-26 ENCOUNTER — TELEPHONE (OUTPATIENT)
Dept: FAMILY MEDICINE CLINIC | Age: 53
End: 2018-11-26

## 2018-11-26 DIAGNOSIS — Z00.00 ROUTINE GENERAL MEDICAL EXAMINATION AT A HEALTH CARE FACILITY: ICD-10-CM

## 2018-11-26 LAB
A/G RATIO: 2 (ref 1.1–2.2)
ALBUMIN SERPL-MCNC: 4.7 G/DL (ref 3.4–5)
ALP BLD-CCNC: 89 U/L (ref 40–129)
ALT SERPL-CCNC: 14 U/L (ref 10–40)
ANION GAP SERPL CALCULATED.3IONS-SCNC: 15 MMOL/L (ref 3–16)
AST SERPL-CCNC: 15 U/L (ref 15–37)
BILIRUB SERPL-MCNC: 0.4 MG/DL (ref 0–1)
BUN BLDV-MCNC: 14 MG/DL (ref 7–20)
CALCIUM SERPL-MCNC: 10.1 MG/DL (ref 8.3–10.6)
CHLORIDE BLD-SCNC: 103 MMOL/L (ref 99–110)
CHOLESTEROL, TOTAL: 193 MG/DL (ref 0–199)
CO2: 24 MMOL/L (ref 21–32)
CREAT SERPL-MCNC: 0.7 MG/DL (ref 0.6–1.1)
GFR AFRICAN AMERICAN: >60
GFR NON-AFRICAN AMERICAN: >60
GLOBULIN: 2.4 G/DL
GLUCOSE BLD-MCNC: 98 MG/DL (ref 70–99)
HCT VFR BLD CALC: 42.3 % (ref 36–48)
HDLC SERPL-MCNC: 58 MG/DL (ref 40–60)
HEMOGLOBIN: 13.9 G/DL (ref 12–16)
LDL CHOLESTEROL CALCULATED: 110 MG/DL
MCH RBC QN AUTO: 29.3 PG (ref 26–34)
MCHC RBC AUTO-ENTMCNC: 32.9 G/DL (ref 31–36)
MCV RBC AUTO: 89 FL (ref 80–100)
PDW BLD-RTO: 12.8 % (ref 12.4–15.4)
PLATELET # BLD: 237 K/UL (ref 135–450)
PMV BLD AUTO: 9.3 FL (ref 5–10.5)
POTASSIUM SERPL-SCNC: 4.3 MMOL/L (ref 3.5–5.1)
RBC # BLD: 4.75 M/UL (ref 4–5.2)
SODIUM BLD-SCNC: 142 MMOL/L (ref 136–145)
TOTAL PROTEIN: 7.1 G/DL (ref 6.4–8.2)
TRIGL SERPL-MCNC: 125 MG/DL (ref 0–150)
VLDLC SERPL CALC-MCNC: 25 MG/DL
WBC # BLD: 10.1 K/UL (ref 4–11)

## 2018-11-27 LAB
ESTIMATED AVERAGE GLUCOSE: 125.5 MG/DL
HBA1C MFR BLD: 6 %

## 2019-02-06 ENCOUNTER — TELEPHONE (OUTPATIENT)
Dept: FAMILY MEDICINE CLINIC | Age: 54
End: 2019-02-06

## 2019-02-11 ENCOUNTER — OFFICE VISIT (OUTPATIENT)
Dept: FAMILY MEDICINE CLINIC | Age: 54
End: 2019-02-11
Payer: COMMERCIAL

## 2019-02-11 VITALS
WEIGHT: 168 LBS | BODY MASS INDEX: 31.72 KG/M2 | RESPIRATION RATE: 12 BRPM | HEIGHT: 61 IN | OXYGEN SATURATION: 95 % | HEART RATE: 79 BPM | SYSTOLIC BLOOD PRESSURE: 128 MMHG | TEMPERATURE: 97.5 F | DIASTOLIC BLOOD PRESSURE: 82 MMHG

## 2019-02-11 DIAGNOSIS — M51.36 DDD (DEGENERATIVE DISC DISEASE), LUMBAR: ICD-10-CM

## 2019-02-11 DIAGNOSIS — J06.9 ACUTE URI: ICD-10-CM

## 2019-02-11 DIAGNOSIS — M47.816 SPONDYLOSIS OF LUMBAR REGION WITHOUT MYELOPATHY OR RADICULOPATHY: ICD-10-CM

## 2019-02-11 DIAGNOSIS — F90.0 ATTENTION DEFICIT HYPERACTIVITY DISORDER (ADHD), PREDOMINANTLY INATTENTIVE TYPE: ICD-10-CM

## 2019-02-11 DIAGNOSIS — M43.16 SPONDYLOLISTHESIS OF LUMBAR REGION: ICD-10-CM

## 2019-02-11 DIAGNOSIS — Z79.899 HIGH RISK MEDICATION USE: ICD-10-CM

## 2019-02-11 DIAGNOSIS — F33.0 MAJOR DEPRESSIVE DISORDER, RECURRENT EPISODE, MILD (HCC): Primary | ICD-10-CM

## 2019-02-11 PROCEDURE — 1036F TOBACCO NON-USER: CPT | Performed by: FAMILY MEDICINE

## 2019-02-11 PROCEDURE — 99214 OFFICE O/P EST MOD 30 MIN: CPT | Performed by: FAMILY MEDICINE

## 2019-02-11 PROCEDURE — G8417 CALC BMI ABV UP PARAM F/U: HCPCS | Performed by: FAMILY MEDICINE

## 2019-02-11 PROCEDURE — G8427 DOCREV CUR MEDS BY ELIG CLIN: HCPCS | Performed by: FAMILY MEDICINE

## 2019-02-11 PROCEDURE — 3017F COLORECTAL CA SCREEN DOC REV: CPT | Performed by: FAMILY MEDICINE

## 2019-02-11 PROCEDURE — G8484 FLU IMMUNIZE NO ADMIN: HCPCS | Performed by: FAMILY MEDICINE

## 2019-02-11 RX ORDER — DULOXETIN HYDROCHLORIDE 30 MG/1
30 CAPSULE, DELAYED RELEASE ORAL DAILY
Qty: 7 CAPSULE | Refills: 0 | Status: SHIPPED | OUTPATIENT
Start: 2019-02-11 | End: 2019-04-09

## 2019-02-11 RX ORDER — DEXTROAMPHETAMINE SACCHARATE, AMPHETAMINE ASPARTATE MONOHYDRATE, DEXTROAMPHETAMINE SULFATE AND AMPHETAMINE SULFATE 7.5; 7.5; 7.5; 7.5 MG/1; MG/1; MG/1; MG/1
60 CAPSULE, EXTENDED RELEASE ORAL EVERY MORNING
Qty: 60 CAPSULE | Refills: 0 | Status: SHIPPED | OUTPATIENT
Start: 2019-02-11 | End: 2019-06-17 | Stop reason: SDUPTHER

## 2019-02-11 RX ORDER — DEXTROAMPHETAMINE SACCHARATE, AMPHETAMINE ASPARTATE MONOHYDRATE, DEXTROAMPHETAMINE SULFATE AND AMPHETAMINE SULFATE 7.5; 7.5; 7.5; 7.5 MG/1; MG/1; MG/1; MG/1
60 CAPSULE, EXTENDED RELEASE ORAL EVERY MORNING
Qty: 60 CAPSULE | Refills: 0 | Status: SHIPPED | OUTPATIENT
Start: 2019-04-12 | End: 2019-06-17 | Stop reason: SDUPTHER

## 2019-02-11 RX ORDER — AZITHROMYCIN 250 MG/1
250 TABLET, FILM COATED ORAL DAILY
Qty: 6 TABLET | Refills: 0 | Status: SHIPPED | OUTPATIENT
Start: 2019-02-11 | End: 2019-04-09

## 2019-02-11 RX ORDER — DULOXETIN HYDROCHLORIDE 60 MG/1
60 CAPSULE, DELAYED RELEASE ORAL DAILY
Qty: 30 CAPSULE | Refills: 5 | Status: SHIPPED | OUTPATIENT
Start: 2019-02-11 | End: 2019-11-14 | Stop reason: SDUPTHER

## 2019-02-11 RX ORDER — HYDROCODONE BITARTRATE AND ACETAMINOPHEN 5; 325 MG/1; MG/1
1 TABLET ORAL EVERY 8 HOURS PRN
Qty: 20 TABLET | Refills: 0 | Status: SHIPPED | OUTPATIENT
Start: 2019-02-11 | End: 2019-11-14 | Stop reason: SDUPTHER

## 2019-02-11 RX ORDER — DEXTROAMPHETAMINE SACCHARATE, AMPHETAMINE ASPARTATE MONOHYDRATE, DEXTROAMPHETAMINE SULFATE AND AMPHETAMINE SULFATE 7.5; 7.5; 7.5; 7.5 MG/1; MG/1; MG/1; MG/1
60 CAPSULE, EXTENDED RELEASE ORAL EVERY MORNING
Qty: 60 CAPSULE | Refills: 0 | Status: SHIPPED | OUTPATIENT
Start: 2019-03-13 | End: 2019-06-17 | Stop reason: SDUPTHER

## 2019-02-18 ENCOUNTER — TELEPHONE (OUTPATIENT)
Dept: FAMILY MEDICINE CLINIC | Age: 54
End: 2019-02-18

## 2019-02-18 NOTE — TELEPHONE ENCOUNTER
Office has been notified that pt is requiring Prior Authorization for the following medication:  -- PA Request - D-Amphetamine ER    Please initiate this request through CoverMyMeds, contacting the following Payor/Insurance:  -- [facility]    Please see below, or the documentation attached to this encounter for any additional information that may assist in processing PA:  --     Thank you!

## 2019-04-09 ENCOUNTER — OFFICE VISIT (OUTPATIENT)
Dept: FAMILY MEDICINE CLINIC | Age: 54
End: 2019-04-09
Payer: COMMERCIAL

## 2019-04-09 VITALS
WEIGHT: 169.8 LBS | BODY MASS INDEX: 32.08 KG/M2 | SYSTOLIC BLOOD PRESSURE: 132 MMHG | HEART RATE: 91 BPM | TEMPERATURE: 97.1 F | RESPIRATION RATE: 12 BRPM | DIASTOLIC BLOOD PRESSURE: 84 MMHG | OXYGEN SATURATION: 96 %

## 2019-04-09 DIAGNOSIS — L02.412 ABSCESS OF AXILLA, LEFT: ICD-10-CM

## 2019-04-09 DIAGNOSIS — R03.0 ELEVATED BLOOD PRESSURE READING: ICD-10-CM

## 2019-04-09 DIAGNOSIS — F33.0 MAJOR DEPRESSIVE DISORDER, RECURRENT EPISODE, MILD (HCC): ICD-10-CM

## 2019-04-09 DIAGNOSIS — H00.014 HORDEOLUM EXTERNUM OF LEFT UPPER EYELID: ICD-10-CM

## 2019-04-09 DIAGNOSIS — J06.9 ACUTE URI: Primary | ICD-10-CM

## 2019-04-09 DIAGNOSIS — L20.84 INTRINSIC ECZEMA: ICD-10-CM

## 2019-04-09 PROCEDURE — 3017F COLORECTAL CA SCREEN DOC REV: CPT | Performed by: FAMILY MEDICINE

## 2019-04-09 PROCEDURE — 1036F TOBACCO NON-USER: CPT | Performed by: FAMILY MEDICINE

## 2019-04-09 PROCEDURE — 99214 OFFICE O/P EST MOD 30 MIN: CPT | Performed by: FAMILY MEDICINE

## 2019-04-09 PROCEDURE — G8417 CALC BMI ABV UP PARAM F/U: HCPCS | Performed by: FAMILY MEDICINE

## 2019-04-09 PROCEDURE — G8427 DOCREV CUR MEDS BY ELIG CLIN: HCPCS | Performed by: FAMILY MEDICINE

## 2019-04-09 RX ORDER — SULFAMETHOXAZOLE AND TRIMETHOPRIM 800; 160 MG/1; MG/1
1 TABLET ORAL 2 TIMES DAILY
Qty: 20 TABLET | Refills: 0 | Status: SHIPPED | OUTPATIENT
Start: 2019-04-09 | End: 2019-04-19

## 2019-04-09 NOTE — PROGRESS NOTES
Here for eval of some eye issues, axillary irritation, blood pressure     Pt had stye to L eye about 1 wk ago, present for about 3-4d and then went away. At this time the stye was better but now is on the upper lid. Vision seems ok, does have some sensitivity. Now with some irritation after warm compresses    Pt noted lump on L axilla, present about 3-4d. Pt noted lump but then went away but then came back. Pt now with discomfort. No discharge or drainage. Pt has been warm compressing . 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 No issues into the breast and no skin changes, discharge. Pt here for follow up of blood pressure. Pt states doing great with adherence to therapy and feels well. No issues of chest pain, shortness of breath. No vision changes, headache, swelling in legs. Pt has been staying active, not doing any formal exercise. pts diet is mediocre, trying to eat better. Pt is eating a good deal of sodium. Except as noted above in the history of present illness, the review of systems is  negative for headache, vision changes, chest pain, shortness of breath, abdominal pain, urinary sx, bowel changes. Past medical, surgical, and social history reviewed and updated  Medications and allergies reviewed and updated         O: /84   Pulse 91   Temp 97.1 °F (36.2 °C) (Oral)   Resp 12   Wt 169 lb 12.8 oz (77 kg)   LMP  (Exact Date)   SpO2 96%   Breastfeeding? No   BMI 32.08 kg/m²   GEN: No acute distress, cooperative, well nourished, alert. HEENT: PEERLA, EOMI , normocephalic/atraumatic, nares and oropharynx clear. Mucous membranes normal, Tympanic membranes clear bilaterally. L lower eyelid with healing stye without erythema, discharge  Neck: soft, supple, no thyromegaly, mass, no Lymphadenopathy  CV: Regular rate and rhythm, no murmur, rubs, gallops. No edema.   Resp: Clear to auscultation bilaterally good air entry bilaterally  No crackles, wheeze. Breathing comfortably. Ext: no clubbing, cyanosis, edema  Skin: L axilla with soft, mildly tender soft, mildly erythematous nodule w/o active discharge. Current Outpatient Medications   Medication Sig Dispense Refill    Crisaborole (EUCRISA) 2 % OINT Apply to affected area QD as directed 1 Tube 0    sulfamethoxazole-trimethoprim (BACTRIM DS) 800-160 MG per tablet Take 1 tablet by mouth 2 times daily for 10 days 20 tablet 0    [START ON 4/12/2019] amphetamine-dextroamphetamine (ADDERALL XR) 30 MG extended release capsule Take 2 capsules by mouth every morning for 30 days. Gaylen Jumper Date: 4/12/19 60 capsule 0    amphetamine-dextroamphetamine (ADDERALL XR) 30 MG extended release capsule Take 2 capsules by mouth every morning for 30 days. Gaylen Jumper Date: 3/13/19 60 capsule 0    DULoxetine (CYMBALTA) 60 MG extended release capsule Take 1 capsule by mouth daily 30 capsule 5    amphetamine-dextroamphetamine (ADDERALL XR) 30 MG extended release capsule Take 2 capsules by mouth every morning for 30 days. Gaylen Jumper Date: 2/11/19 60 capsule 0     Current Facility-Administered Medications   Medication Dose Route Frequency Provider Last Rate Last Dose    promethazine (PHENERGAN) injection 25 mg  25 mg Intramuscular Once Laurent Repress, DO             ASSESSMENT / PLAN:    1. Acute URI  C/w viral URI  Cont over the counter/symptomatic treatment. Follow up for persistent symptoms in 7 to 10 days or sooner for worsening symptomatology     2. Hordeolum externum of left upper eyelid  Warm compresses discussed monitor with supportive therapy and bactrim as below    3. Elevated blood pressure reading  Recheck normal  Monitor with diet/exercise and sodium restriction    4. Major depressive disorder, recurrent episode, mild (HCC)  Mood stable with cymbalta  Continue present management.      5. Abscess of axilla, left  Not amenable to I+D at this time  Warm

## 2019-06-13 ENCOUNTER — TELEPHONE (OUTPATIENT)
Dept: FAMILY MEDICINE CLINIC | Age: 54
End: 2019-06-13

## 2019-06-13 NOTE — TELEPHONE ENCOUNTER
Patient is requesting to be seen on Monday, has a lump under her armpit    Please call and advise [phone]

## 2019-06-17 ENCOUNTER — OFFICE VISIT (OUTPATIENT)
Dept: FAMILY MEDICINE CLINIC | Age: 54
End: 2019-06-17
Payer: COMMERCIAL

## 2019-06-17 VITALS
SYSTOLIC BLOOD PRESSURE: 112 MMHG | HEART RATE: 79 BPM | TEMPERATURE: 96.3 F | BODY MASS INDEX: 32.46 KG/M2 | RESPIRATION RATE: 12 BRPM | WEIGHT: 171.8 LBS | OXYGEN SATURATION: 98 % | DIASTOLIC BLOOD PRESSURE: 76 MMHG

## 2019-06-17 DIAGNOSIS — K44.9 HIATAL HERNIA: ICD-10-CM

## 2019-06-17 DIAGNOSIS — F33.0 MAJOR DEPRESSIVE DISORDER, RECURRENT EPISODE, MILD (HCC): ICD-10-CM

## 2019-06-17 DIAGNOSIS — F90.0 ATTENTION DEFICIT HYPERACTIVITY DISORDER (ADHD), PREDOMINANTLY INATTENTIVE TYPE: ICD-10-CM

## 2019-06-17 DIAGNOSIS — L02.412 ABSCESS OF LEFT AXILLA: Primary | ICD-10-CM

## 2019-06-17 DIAGNOSIS — Z12.39 SCREENING FOR BREAST CANCER: ICD-10-CM

## 2019-06-17 DIAGNOSIS — R13.19 INTERMITTENT DYSPHAGIA: ICD-10-CM

## 2019-06-17 DIAGNOSIS — R73.9 HYPERGLYCEMIA: ICD-10-CM

## 2019-06-17 PROCEDURE — G8417 CALC BMI ABV UP PARAM F/U: HCPCS | Performed by: FAMILY MEDICINE

## 2019-06-17 PROCEDURE — 1036F TOBACCO NON-USER: CPT | Performed by: FAMILY MEDICINE

## 2019-06-17 PROCEDURE — G8427 DOCREV CUR MEDS BY ELIG CLIN: HCPCS | Performed by: FAMILY MEDICINE

## 2019-06-17 PROCEDURE — 3017F COLORECTAL CA SCREEN DOC REV: CPT | Performed by: FAMILY MEDICINE

## 2019-06-17 PROCEDURE — 99214 OFFICE O/P EST MOD 30 MIN: CPT | Performed by: FAMILY MEDICINE

## 2019-06-17 RX ORDER — DEXTROAMPHETAMINE SACCHARATE, AMPHETAMINE ASPARTATE MONOHYDRATE, DEXTROAMPHETAMINE SULFATE AND AMPHETAMINE SULFATE 7.5; 7.5; 7.5; 7.5 MG/1; MG/1; MG/1; MG/1
60 CAPSULE, EXTENDED RELEASE ORAL EVERY MORNING
Qty: 60 CAPSULE | Refills: 0 | Status: SHIPPED | OUTPATIENT
Start: 2019-06-17 | End: 2019-11-14 | Stop reason: SDUPTHER

## 2019-06-17 RX ORDER — SULFAMETHOXAZOLE AND TRIMETHOPRIM 800; 160 MG/1; MG/1
1 TABLET ORAL 2 TIMES DAILY
Qty: 20 TABLET | Refills: 0 | Status: SHIPPED | OUTPATIENT
Start: 2019-06-17 | End: 2019-06-27

## 2019-06-17 RX ORDER — DEXTROAMPHETAMINE SACCHARATE, AMPHETAMINE ASPARTATE MONOHYDRATE, DEXTROAMPHETAMINE SULFATE AND AMPHETAMINE SULFATE 7.5; 7.5; 7.5; 7.5 MG/1; MG/1; MG/1; MG/1
60 CAPSULE, EXTENDED RELEASE ORAL EVERY MORNING
Qty: 60 CAPSULE | Refills: 0 | Status: SHIPPED | OUTPATIENT
Start: 2019-08-16 | End: 2019-11-14 | Stop reason: SDUPTHER

## 2019-06-17 RX ORDER — DEXTROAMPHETAMINE SACCHARATE, AMPHETAMINE ASPARTATE MONOHYDRATE, DEXTROAMPHETAMINE SULFATE AND AMPHETAMINE SULFATE 7.5; 7.5; 7.5; 7.5 MG/1; MG/1; MG/1; MG/1
30 CAPSULE, EXTENDED RELEASE ORAL EVERY MORNING
COMMUNITY
End: 2019-11-14

## 2019-06-17 RX ORDER — DEXTROAMPHETAMINE SACCHARATE, AMPHETAMINE ASPARTATE MONOHYDRATE, DEXTROAMPHETAMINE SULFATE AND AMPHETAMINE SULFATE 7.5; 7.5; 7.5; 7.5 MG/1; MG/1; MG/1; MG/1
60 CAPSULE, EXTENDED RELEASE ORAL EVERY MORNING
Qty: 60 CAPSULE | Refills: 0 | Status: SHIPPED | OUTPATIENT
Start: 2019-07-17 | End: 2019-11-14 | Stop reason: SDUPTHER

## 2019-06-17 NOTE — PROGRESS NOTES
Here for eval of axillary lesion on L side. Pt states that this sx comes and goes and relates to shaving. Pt states it caused some irritation and did have some discharge and drainage from the area. Pt is concerned as she has exposure at work to [employer]. Pt has had similar sx in the past that never drained. Pt states this lesion started a few days ago and 2d ago had active discharge / drainage. Pt only gets sx in axilla after shaving. It does feel better since drainage     Pt states that otherwise things are doing well. Here for follow up of ADD. Pt states that they are doing very well with current therapy and feels that control of symptoms are adequate at this time. No breakthru symptoms and no need/indication for adjustment in therapy. Taking meds as prescribed and denies any illicit medication usage of misuse of their stimulant thearpy. Mood is stable and denies any issues of depression or anxiety associated with treatment of ADD. Pt doing well to stay active, is exercising and is monitoring diet. Except as noted above in the history of present illness, the review of systems is  negative for headache, vision changes, chest pain, shortness of breath, abdominal pain, urinary sx, bowel changes. Past medical, surgical, and social history reviewed and updated  Medications and allergies reviewed and updated         O: /76   Pulse 79   Temp 96.3 °F (35.7 °C) (Oral)   Resp 12   Wt 171 lb 12.8 oz (77.9 kg)   LMP  (Exact Date)   SpO2 98%   Breastfeeding? No   BMI 32.46 kg/m²   GEN: No acute distress, cooperative, well nourished, alert. HEENT: PEERLA, EOMI , normocephalic/atraumatic, nares and oropharynx clear. Mucous membranes normal, Tympanic membranes clear bilaterally. Neck: soft, supple, no thyromegaly, mass, no Lymphadenopathy  CV: Regular rate and rhythm, no murmur, rubs, gallops. No edema.   Resp: Clear to auscultation bilaterally good air entry bilaterally  No crackles, wheeze. Breathing comfortably. Skin: L axilla with irritated abscess s/p spontanous drainage. Psych: mood stable, No suicidal thoughts or ideation        Current Outpatient Medications   Medication Sig Dispense Refill    amphetamine-dextroamphetamine (ADDERALL XR) 30 MG extended release capsule Take 30 mg by mouth every morning.  [START ON 8/16/2019] amphetamine-dextroamphetamine (ADDERALL XR) 30 MG extended release capsule Take 2 capsules by mouth every morning for 30 days. 60 capsule 0    [START ON 7/17/2019] amphetamine-dextroamphetamine (ADDERALL XR) 30 MG extended release capsule Take 2 capsules by mouth every morning for 30 days. 60 capsule 0    amphetamine-dextroamphetamine (ADDERALL XR) 30 MG extended release capsule Take 2 capsules by mouth every morning for 30 days. 60 capsule 0    sulfamethoxazole-trimethoprim (BACTRIM DS) 800-160 MG per tablet Take 1 tablet by mouth 2 times daily for 10 days 20 tablet 0    DULoxetine (CYMBALTA) 60 MG extended release capsule Take 1 capsule by mouth daily 30 capsule 5    Crisaborole (EUCRISA) 2 % OINT Apply to affected area QD as directed 1 Tube 0     Current Facility-Administered Medications   Medication Dose Route Frequency Provider Last Rate Last Dose    promethazine (PHENERGAN) injection 25 mg  25 mg Intramuscular Once Radha Davian,                ASSESSMENT / PLAN:    1. Attention deficit hyperactivity disorder (ADHD), predominantly inattentive type  Stable w/ current therapy  refills given for 3 months  Pt aware of need for every 3 month medication followup appointments, and that medication refills for benzodiazepines, narcotics and/or stimulants will only be given at appointment. See [facility]  - amphetamine-dextroamphetamine (ADDERALL XR) 30 MG extended release capsule; Take 2 capsules by mouth every morning for 30 days. Dispense: 60 capsule; Refill: 0  - amphetamine-dextroamphetamine (ADDERALL XR) 30 MG extended release capsule;  Take 2 capsules by mouth every morning for 30 days. Dispense: 60 capsule; Refill: 0  - amphetamine-dextroamphetamine (ADDERALL XR) 30 MG extended release capsule; Take 2 capsules by mouth every morning for 30 days. Dispense: 60 capsule; Refill: 0    2. Abscess of left axilla  S/p spontaneous rupture and drainage  Warm compresses BID and bactrim DS BID x 10d    3. Screening for breast cancer  Working to set up    4. Major depressive disorder, recurrent episode, mild (HCC)  Mood stable    5. Intermittent dysphagia  Exam nonfocal, intermittent sx  Discussed UGI vs referral to GI  Will refer back to dr. Malcolm Card for eval  - Rabia Curry MD, Gastroenterology, [facility]    6. Hiatal hernia  Await results of EGD as above    7. Hyperglycemia  Aware risk of persistent elevation of blood sugars and potential complications and development of diabetes mellitus  Check bloodwork as below  Management pending results. - Basic Metabolic Panel; Future  - Hemoglobin A1C; Future           Follow-up appointment:   3 mos/prn    Discussed use, benefit, and side effects of all prescribed medications. Barriers to medication compliance addressed. All patient questions answered. Pt voiced understanding. When applicable, patient's outside records were reviewed through [facility]. The patient has signed appropriate paperworks/consents.

## 2019-11-14 ENCOUNTER — OFFICE VISIT (OUTPATIENT)
Dept: FAMILY MEDICINE CLINIC | Age: 54
End: 2019-11-14
Payer: COMMERCIAL

## 2019-11-14 VITALS
OXYGEN SATURATION: 96 % | HEIGHT: 61 IN | RESPIRATION RATE: 16 BRPM | WEIGHT: 174 LBS | HEART RATE: 84 BPM | SYSTOLIC BLOOD PRESSURE: 124 MMHG | DIASTOLIC BLOOD PRESSURE: 82 MMHG | BODY MASS INDEX: 32.85 KG/M2

## 2019-11-14 DIAGNOSIS — Z12.39 SCREENING FOR BREAST CANCER: ICD-10-CM

## 2019-11-14 DIAGNOSIS — Z00.00 ROUTINE GENERAL MEDICAL EXAMINATION AT A HEALTH CARE FACILITY: Primary | ICD-10-CM

## 2019-11-14 DIAGNOSIS — K21.00 REFLUX ESOPHAGITIS: ICD-10-CM

## 2019-11-14 DIAGNOSIS — R73.9 HYPERGLYCEMIA: ICD-10-CM

## 2019-11-14 DIAGNOSIS — R06.83 SNORING: ICD-10-CM

## 2019-11-14 DIAGNOSIS — Z23 NEED FOR SHINGLES VACCINE: ICD-10-CM

## 2019-11-14 DIAGNOSIS — K44.9 HIATAL HERNIA: ICD-10-CM

## 2019-11-14 DIAGNOSIS — M47.816 SPONDYLOSIS OF LUMBAR REGION WITHOUT MYELOPATHY OR RADICULOPATHY: ICD-10-CM

## 2019-11-14 DIAGNOSIS — R13.19 INTERMITTENT DYSPHAGIA: ICD-10-CM

## 2019-11-14 DIAGNOSIS — F90.0 ATTENTION DEFICIT HYPERACTIVITY DISORDER (ADHD), PREDOMINANTLY INATTENTIVE TYPE: ICD-10-CM

## 2019-11-14 DIAGNOSIS — M43.16 SPONDYLOLISTHESIS OF LUMBAR REGION: ICD-10-CM

## 2019-11-14 DIAGNOSIS — E66.9 CLASS 1 OBESITY WITHOUT SERIOUS COMORBIDITY WITH BODY MASS INDEX (BMI) OF 33.0 TO 33.9 IN ADULT, UNSPECIFIED OBESITY TYPE: ICD-10-CM

## 2019-11-14 PROCEDURE — G8484 FLU IMMUNIZE NO ADMIN: HCPCS | Performed by: FAMILY MEDICINE

## 2019-11-14 PROCEDURE — 99396 PREV VISIT EST AGE 40-64: CPT | Performed by: FAMILY MEDICINE

## 2019-11-14 RX ORDER — DEXTROAMPHETAMINE SACCHARATE, AMPHETAMINE ASPARTATE MONOHYDRATE, DEXTROAMPHETAMINE SULFATE AND AMPHETAMINE SULFATE 7.5; 7.5; 7.5; 7.5 MG/1; MG/1; MG/1; MG/1
60 CAPSULE, EXTENDED RELEASE ORAL EVERY MORNING
Qty: 60 CAPSULE | Refills: 0 | Status: SHIPPED | OUTPATIENT
Start: 2020-01-13 | End: 2020-01-13 | Stop reason: SDUPTHER

## 2019-11-14 RX ORDER — DEXTROAMPHETAMINE SACCHARATE, AMPHETAMINE ASPARTATE MONOHYDRATE, DEXTROAMPHETAMINE SULFATE AND AMPHETAMINE SULFATE 7.5; 7.5; 7.5; 7.5 MG/1; MG/1; MG/1; MG/1
60 CAPSULE, EXTENDED RELEASE ORAL EVERY MORNING
Qty: 60 CAPSULE | Refills: 0 | Status: SHIPPED | OUTPATIENT
Start: 2019-11-14 | End: 2020-05-21

## 2019-11-14 RX ORDER — DULOXETIN HYDROCHLORIDE 30 MG/1
30 CAPSULE, DELAYED RELEASE ORAL DAILY
Qty: 7 CAPSULE | Refills: 0 | Status: SHIPPED | OUTPATIENT
Start: 2019-11-14 | End: 2020-01-13

## 2019-11-14 RX ORDER — DEXTROAMPHETAMINE SACCHARATE, AMPHETAMINE ASPARTATE MONOHYDRATE, DEXTROAMPHETAMINE SULFATE AND AMPHETAMINE SULFATE 7.5; 7.5; 7.5; 7.5 MG/1; MG/1; MG/1; MG/1
60 CAPSULE, EXTENDED RELEASE ORAL EVERY MORNING
Qty: 60 CAPSULE | Refills: 0 | Status: SHIPPED | OUTPATIENT
Start: 2019-12-14 | End: 2020-05-21

## 2019-11-14 RX ORDER — DULOXETIN HYDROCHLORIDE 60 MG/1
60 CAPSULE, DELAYED RELEASE ORAL DAILY
Qty: 30 CAPSULE | Refills: 5 | Status: SHIPPED | OUTPATIENT
Start: 2019-11-14 | End: 2020-01-13

## 2019-11-14 RX ORDER — HYDROCODONE BITARTRATE AND ACETAMINOPHEN 5; 325 MG/1; MG/1
1 TABLET ORAL EVERY 8 HOURS PRN
Qty: 20 TABLET | Refills: 0 | Status: SHIPPED | OUTPATIENT
Start: 2019-11-14 | End: 2020-05-21 | Stop reason: SDUPTHER

## 2019-11-21 DIAGNOSIS — Z00.00 ROUTINE GENERAL MEDICAL EXAMINATION AT A HEALTH CARE FACILITY: ICD-10-CM

## 2019-11-21 LAB
A/G RATIO: 1.9 (ref 1.1–2.2)
ALBUMIN SERPL-MCNC: 4.6 G/DL (ref 3.4–5)
ALP BLD-CCNC: 78 U/L (ref 40–129)
ALT SERPL-CCNC: 12 U/L (ref 10–40)
ANION GAP SERPL CALCULATED.3IONS-SCNC: 11 MMOL/L (ref 3–16)
AST SERPL-CCNC: 14 U/L (ref 15–37)
BILIRUB SERPL-MCNC: 0.4 MG/DL (ref 0–1)
BUN BLDV-MCNC: 12 MG/DL (ref 7–20)
CALCIUM SERPL-MCNC: 9.1 MG/DL (ref 8.3–10.6)
CHLORIDE BLD-SCNC: 100 MMOL/L (ref 99–110)
CHOLESTEROL, TOTAL: 180 MG/DL (ref 0–199)
CO2: 25 MMOL/L (ref 21–32)
CREAT SERPL-MCNC: 0.6 MG/DL (ref 0.6–1.1)
GFR AFRICAN AMERICAN: >60
GFR NON-AFRICAN AMERICAN: >60
GLOBULIN: 2.4 G/DL
GLUCOSE BLD-MCNC: 103 MG/DL (ref 70–99)
HCT VFR BLD CALC: 40.8 % (ref 36–48)
HDLC SERPL-MCNC: 70 MG/DL (ref 40–60)
HEMOGLOBIN: 13.3 G/DL (ref 12–16)
LDL CHOLESTEROL CALCULATED: 94 MG/DL
MCH RBC QN AUTO: 29.3 PG (ref 26–34)
MCHC RBC AUTO-ENTMCNC: 32.6 G/DL (ref 31–36)
MCV RBC AUTO: 89.8 FL (ref 80–100)
PDW BLD-RTO: 13.7 % (ref 12.4–15.4)
PLATELET # BLD: 261 K/UL (ref 135–450)
PMV BLD AUTO: 8.8 FL (ref 5–10.5)
POTASSIUM SERPL-SCNC: 4 MMOL/L (ref 3.5–5.1)
RBC # BLD: 4.55 M/UL (ref 4–5.2)
SODIUM BLD-SCNC: 136 MMOL/L (ref 136–145)
TOTAL PROTEIN: 7 G/DL (ref 6.4–8.2)
TRIGL SERPL-MCNC: 81 MG/DL (ref 0–150)
TSH SERPL DL<=0.05 MIU/L-ACNC: 3.56 UIU/ML (ref 0.27–4.2)
VLDLC SERPL CALC-MCNC: 16 MG/DL
WBC # BLD: 7 K/UL (ref 4–11)

## 2019-11-22 LAB
ESTIMATED AVERAGE GLUCOSE: 114 MG/DL
HBA1C MFR BLD: 5.6 %

## 2019-12-06 ENCOUNTER — HOSPITAL ENCOUNTER (EMERGENCY)
Age: 54
Discharge: HOME | End: 2019-12-06
Attending: EMERGENCY MEDICINE
Payer: COMMERCIAL

## 2019-12-06 VITALS
WEIGHT: 170 LBS | RESPIRATION RATE: 16 BRPM | DIASTOLIC BLOOD PRESSURE: 89 MMHG | HEART RATE: 79 BPM | BODY MASS INDEX: 32.1 KG/M2 | SYSTOLIC BLOOD PRESSURE: 158 MMHG | OXYGEN SATURATION: 98 % | TEMPERATURE: 98.2 F | HEIGHT: 61 IN

## 2019-12-06 DIAGNOSIS — R19.7 NAUSEA, VOMITING AND DIARRHEA: ICD-10-CM

## 2019-12-06 DIAGNOSIS — R10.13 ABDOMINAL PAIN, EPIGASTRIC: Primary | ICD-10-CM

## 2019-12-06 DIAGNOSIS — R31.21 ASYMPTOMATIC MICROSCOPIC HEMATURIA: ICD-10-CM

## 2019-12-06 DIAGNOSIS — R11.2 NAUSEA, VOMITING AND DIARRHEA: ICD-10-CM

## 2019-12-06 LAB
ALBUMIN SERPL-MCNC: 4.3 G/DL (ref 3.4–5)
ALP BLD-CCNC: 86 U/L (ref 40–129)
ALT SERPL-CCNC: 12 U/L (ref 10–40)
ANION GAP SERPL CALCULATED.3IONS-SCNC: 13 MMOL/L (ref 3–16)
AST SERPL-CCNC: 16 U/L (ref 15–37)
BACTERIA: ABNORMAL /HPF
BASOPHILS ABSOLUTE: 0.1 K/UL (ref 0–0.2)
BASOPHILS RELATIVE PERCENT: 1.1 %
BILIRUB SERPL-MCNC: 0.4 MG/DL (ref 0–1)
BILIRUBIN DIRECT: <0.2 MG/DL (ref 0–0.3)
BILIRUBIN URINE: NEGATIVE
BILIRUBIN, INDIRECT: NORMAL MG/DL (ref 0–1)
BLOOD, URINE: ABNORMAL
BUN BLDV-MCNC: 13 MG/DL (ref 7–20)
CALCIUM SERPL-MCNC: 8.2 MG/DL (ref 8.3–10.6)
CHLORIDE BLD-SCNC: 99 MMOL/L (ref 99–110)
CLARITY: CLEAR
CO2: 26 MMOL/L (ref 21–32)
COLOR: YELLOW
CREAT SERPL-MCNC: 0.8 MG/DL (ref 0.6–1.1)
EOSINOPHILS ABSOLUTE: 0.1 K/UL (ref 0–0.6)
EOSINOPHILS RELATIVE PERCENT: 1.1 %
EPITHELIAL CELLS, UA: ABNORMAL /HPF
GFR AFRICAN AMERICAN: >60
GFR NON-AFRICAN AMERICAN: >60
GLUCOSE BLD-MCNC: 122 MG/DL (ref 70–99)
GLUCOSE URINE: NEGATIVE MG/DL
HCT VFR BLD CALC: 41.1 % (ref 36–48)
HEMOGLOBIN: 13.5 G/DL (ref 12–16)
KETONES, URINE: NEGATIVE MG/DL
LEUKOCYTE ESTERASE, URINE: NEGATIVE
LIPASE: 18 U/L (ref 13–60)
LYMPHOCYTES ABSOLUTE: 2 K/UL (ref 1–5.1)
LYMPHOCYTES RELATIVE PERCENT: 23.1 %
MCH RBC QN AUTO: 29.4 PG (ref 26–34)
MCHC RBC AUTO-ENTMCNC: 32.9 G/DL (ref 31–36)
MCV RBC AUTO: 89.4 FL (ref 80–100)
MICROSCOPIC EXAMINATION: YES
MONOCYTES ABSOLUTE: 0.5 K/UL (ref 0–1.3)
MONOCYTES RELATIVE PERCENT: 5.8 %
NEUTROPHILS ABSOLUTE: 5.9 K/UL (ref 1.7–7.7)
NEUTROPHILS RELATIVE PERCENT: 68.9 %
NITRITE, URINE: NEGATIVE
PDW BLD-RTO: 13.6 % (ref 12.4–15.4)
PH UA: 6 (ref 5–8)
PLATELET # BLD: 258 K/UL (ref 135–450)
PMV BLD AUTO: 8.1 FL (ref 5–10.5)
POTASSIUM REFLEX MAGNESIUM: 3.8 MMOL/L (ref 3.5–5.1)
PROTEIN UA: NEGATIVE MG/DL
RBC # BLD: 4.6 M/UL (ref 4–5.2)
RBC UA: ABNORMAL /HPF (ref 0–2)
SODIUM BLD-SCNC: 138 MMOL/L (ref 136–145)
SPECIFIC GRAVITY UA: >=1.03 (ref 1–1.03)
TOTAL PROTEIN: 7.5 G/DL (ref 6.4–8.2)
URINE TYPE: ABNORMAL
UROBILINOGEN, URINE: 0.2 E.U./DL
WBC # BLD: 8.5 K/UL (ref 4–11)
WBC UA: ABNORMAL /HPF (ref 0–5)

## 2019-12-06 PROCEDURE — 83690 ASSAY OF LIPASE: CPT

## 2019-12-06 PROCEDURE — 99283 EMERGENCY DEPT VISIT LOW MDM: CPT

## 2019-12-06 PROCEDURE — 96374 THER/PROPH/DIAG INJ IV PUSH: CPT

## 2019-12-06 PROCEDURE — 85025 COMPLETE CBC W/AUTO DIFF WBC: CPT

## 2019-12-06 PROCEDURE — 96361 HYDRATE IV INFUSION ADD-ON: CPT

## 2019-12-06 PROCEDURE — 2580000003 HC RX 258: Performed by: PHYSICIAN ASSISTANT

## 2019-12-06 PROCEDURE — 96372 THER/PROPH/DIAG INJ SC/IM: CPT

## 2019-12-06 PROCEDURE — 80076 HEPATIC FUNCTION PANEL: CPT

## 2019-12-06 PROCEDURE — 6360000002 HC RX W HCPCS: Performed by: PHYSICIAN ASSISTANT

## 2019-12-06 PROCEDURE — 81001 URINALYSIS AUTO W/SCOPE: CPT

## 2019-12-06 PROCEDURE — 80048 BASIC METABOLIC PNL TOTAL CA: CPT

## 2019-12-06 RX ORDER — 0.9 % SODIUM CHLORIDE 0.9 %
1000 INTRAVENOUS SOLUTION INTRAVENOUS ONCE
Status: COMPLETED | OUTPATIENT
Start: 2019-12-06 | End: 2019-12-06

## 2019-12-06 RX ORDER — DICYCLOMINE HCL 20 MG
20 TABLET ORAL EVERY 6 HOURS PRN
Qty: 12 TABLET | Refills: 0 | Status: SHIPPED | OUTPATIENT
Start: 2019-12-06 | End: 2020-05-21

## 2019-12-06 RX ORDER — ONDANSETRON 2 MG/ML
4 INJECTION INTRAMUSCULAR; INTRAVENOUS ONCE
Status: COMPLETED | OUTPATIENT
Start: 2019-12-06 | End: 2019-12-06

## 2019-12-06 RX ORDER — DICYCLOMINE HYDROCHLORIDE 10 MG/ML
20 INJECTION INTRAMUSCULAR ONCE
Status: COMPLETED | OUTPATIENT
Start: 2019-12-06 | End: 2019-12-06

## 2019-12-06 RX ORDER — ONDANSETRON 4 MG/1
4 TABLET, ORALLY DISINTEGRATING ORAL EVERY 8 HOURS PRN
Qty: 20 TABLET | Refills: 0 | Status: SHIPPED | OUTPATIENT
Start: 2019-12-06 | End: 2021-01-26

## 2019-12-06 RX ADMIN — DICYCLOMINE HYDROCHLORIDE 20 MG: 20 INJECTION, SOLUTION INTRAMUSCULAR at 09:32

## 2019-12-06 RX ADMIN — SODIUM CHLORIDE 1000 ML: 9 INJECTION, SOLUTION INTRAVENOUS at 09:32

## 2019-12-06 RX ADMIN — ONDANSETRON 4 MG: 2 INJECTION INTRAMUSCULAR; INTRAVENOUS at 09:32

## 2019-12-06 ASSESSMENT — PAIN DESCRIPTION - PAIN TYPE: TYPE: ACUTE PAIN

## 2019-12-06 ASSESSMENT — PAIN DESCRIPTION - LOCATION: LOCATION: ABDOMEN

## 2019-12-06 ASSESSMENT — PAIN DESCRIPTION - ORIENTATION: ORIENTATION: MID

## 2019-12-09 RX ORDER — OMEPRAZOLE 40 MG/1
40 CAPSULE, DELAYED RELEASE ORAL DAILY
Qty: 30 CAPSULE | Refills: 5 | Status: SHIPPED | OUTPATIENT
Start: 2019-12-09 | End: 2021-07-21

## 2019-12-10 ASSESSMENT — ENCOUNTER SYMPTOMS
SHORTNESS OF BREATH: 0
SORE THROAT: 0
RHINORRHEA: 0
VOMITING: 1
NAUSEA: 1
COUGH: 0
ABDOMINAL PAIN: 1
DIARRHEA: 1

## 2020-01-13 ENCOUNTER — OFFICE VISIT (OUTPATIENT)
Dept: FAMILY MEDICINE CLINIC | Age: 55
End: 2020-01-13
Payer: COMMERCIAL

## 2020-01-13 VITALS
RESPIRATION RATE: 14 BRPM | WEIGHT: 171 LBS | BODY MASS INDEX: 32.31 KG/M2 | OXYGEN SATURATION: 97 % | DIASTOLIC BLOOD PRESSURE: 88 MMHG | HEART RATE: 97 BPM | SYSTOLIC BLOOD PRESSURE: 122 MMHG | TEMPERATURE: 98.7 F

## 2020-01-13 PROCEDURE — 1036F TOBACCO NON-USER: CPT | Performed by: FAMILY MEDICINE

## 2020-01-13 PROCEDURE — G8484 FLU IMMUNIZE NO ADMIN: HCPCS | Performed by: FAMILY MEDICINE

## 2020-01-13 PROCEDURE — G8417 CALC BMI ABV UP PARAM F/U: HCPCS | Performed by: FAMILY MEDICINE

## 2020-01-13 PROCEDURE — 99214 OFFICE O/P EST MOD 30 MIN: CPT | Performed by: FAMILY MEDICINE

## 2020-01-13 PROCEDURE — G8427 DOCREV CUR MEDS BY ELIG CLIN: HCPCS | Performed by: FAMILY MEDICINE

## 2020-01-13 PROCEDURE — 3017F COLORECTAL CA SCREEN DOC REV: CPT | Performed by: FAMILY MEDICINE

## 2020-01-13 RX ORDER — SULFAMETHOXAZOLE AND TRIMETHOPRIM 800; 160 MG/1; MG/1
1 TABLET ORAL 2 TIMES DAILY
Qty: 20 TABLET | Refills: 0 | Status: SHIPPED | OUTPATIENT
Start: 2020-01-13 | End: 2020-01-23

## 2020-01-13 RX ORDER — DEXTROAMPHETAMINE SACCHARATE, AMPHETAMINE ASPARTATE MONOHYDRATE, DEXTROAMPHETAMINE SULFATE AND AMPHETAMINE SULFATE 7.5; 7.5; 7.5; 7.5 MG/1; MG/1; MG/1; MG/1
30 CAPSULE, EXTENDED RELEASE ORAL EVERY MORNING
Qty: 30 CAPSULE | Refills: 0 | Status: SHIPPED | OUTPATIENT
Start: 2020-01-13 | End: 2020-05-21

## 2020-01-13 RX ORDER — PROMETHAZINE HYDROCHLORIDE AND CODEINE PHOSPHATE 6.25; 1 MG/5ML; MG/5ML
5 SYRUP ORAL EVERY 4 HOURS PRN
Qty: 120 ML | Refills: 0 | Status: SHIPPED | OUTPATIENT
Start: 2020-01-13 | End: 2020-01-18

## 2020-01-13 RX ORDER — ALBUTEROL SULFATE 90 UG/1
2 AEROSOL, METERED RESPIRATORY (INHALATION) EVERY 6 HOURS PRN
Qty: 1 INHALER | Refills: 3 | Status: SHIPPED | OUTPATIENT
Start: 2020-01-13 | End: 2021-11-05 | Stop reason: SDUPTHER

## 2020-01-13 NOTE — PROGRESS NOTES
Here for eval of URI sx, present on and off for a fwe weeks to 1 month but with persistent nasal congestion and some chest congestion. Pt does not feel that she ever gets 100% better. Pt has had exposure to PNA and flu at work. Pt feels sx initially were more in sinuses, but now with more chest congestion and cough. Cough yellowish but intermittent. No hempotysis and no issues of shortness of breath, wheezing. Moderate persistent nasal congestion. Pt states that things are doing ok, states that she has not been consistent with her dosing of adderall. Pt does take but not consistently due to some side effects. Pt states that she did take 2 a day but cut down to 1 a day. Pt felt too 'zombied' when she was on 2 and cut down 1 day. Pt was on cymbalta but did not tolerate due to nausea. Pt did take for a full month but stopped after the month due to nausea. Pt feels mood is better and anxiety has decreased. Wants to monitor with supportive therapy. Except as noted above in the history of present illness, the review of systems is  negative for headache, vision changes, chest pain, shortness of breath, abdominal pain, urinary sx, bowel changes. Past medical, surgical, and social history reviewed and updated  Medications and allergies reviewed and updated         O: /88   Pulse 97   Temp 98.7 °F (37.1 °C) (Oral)   Resp 14   Wt 171 lb (77.6 kg)   LMP  (Exact Date)   SpO2 97%   BMI 32.31 kg/m²   GEN: No acute distress, cooperative, well nourished, alert. HEENT: PEERLA, EOMI , normocephalic/atraumatic, nares and oropharynx clear. Mucous membranes normal, Tympanic membranes clear bilaterally. Mild to modreate nasal congestion bilaterally, mild postnasal drip in posterior oropharynx  No sinus tender to palpation bilaterally  Neck: soft, supple, no thyromegaly, mass, no Lymphadenopathy  CV: Regular rate and rhythm, no murmur, rubs, gallops. No edema.   Resp: Clear to auscultation

## 2020-01-13 NOTE — LETTER
[address]  Phone: [phone]  Fax: [phone]    Kentrell Rubi MD        January 13, 2020     Patient: Jose David Vaca   YOB: 1965   Date of Visit: 1/13/2020       To Whom it May Concern:    Alison Spears was seen in my clinic on 1/13/2020. She may return to work on 01/13/2020. If you have any questions or concerns, please don't hesitate to call.     Sincerely,         Kentrell Rubi MD

## 2020-02-19 ENCOUNTER — OFFICE VISIT (OUTPATIENT)
Dept: FAMILY MEDICINE CLINIC | Age: 55
End: 2020-02-19
Payer: COMMERCIAL

## 2020-02-19 ENCOUNTER — TELEPHONE (OUTPATIENT)
Dept: FAMILY MEDICINE CLINIC | Age: 55
End: 2020-02-19

## 2020-02-19 VITALS
OXYGEN SATURATION: 97 % | DIASTOLIC BLOOD PRESSURE: 84 MMHG | SYSTOLIC BLOOD PRESSURE: 128 MMHG | WEIGHT: 170.8 LBS | HEIGHT: 61 IN | HEART RATE: 96 BPM | BODY MASS INDEX: 32.25 KG/M2

## 2020-02-19 PROCEDURE — 3017F COLORECTAL CA SCREEN DOC REV: CPT | Performed by: FAMILY MEDICINE

## 2020-02-19 PROCEDURE — 99214 OFFICE O/P EST MOD 30 MIN: CPT | Performed by: FAMILY MEDICINE

## 2020-02-19 PROCEDURE — 1036F TOBACCO NON-USER: CPT | Performed by: FAMILY MEDICINE

## 2020-02-19 PROCEDURE — G8427 DOCREV CUR MEDS BY ELIG CLIN: HCPCS | Performed by: FAMILY MEDICINE

## 2020-02-19 PROCEDURE — G8417 CALC BMI ABV UP PARAM F/U: HCPCS | Performed by: FAMILY MEDICINE

## 2020-02-19 PROCEDURE — G8484 FLU IMMUNIZE NO ADMIN: HCPCS | Performed by: FAMILY MEDICINE

## 2020-02-19 RX ORDER — CIPROFLOXACIN 500 MG/1
500 TABLET, FILM COATED ORAL 2 TIMES DAILY
Qty: 20 TABLET | Refills: 0 | Status: SHIPPED | OUTPATIENT
Start: 2020-02-19 | End: 2020-02-29

## 2020-02-19 RX ORDER — ONDANSETRON 4 MG/1
4 TABLET, FILM COATED ORAL DAILY PRN
Qty: 30 TABLET | Refills: 0 | Status: SHIPPED | OUTPATIENT
Start: 2020-02-19 | End: 2020-05-21

## 2020-02-19 NOTE — PROGRESS NOTES
Here for eval of AGE sx, present for about 4-5d. Pt states that sx started nausea and vomiting and then sx progressed to loose bowels. Pt with some persistent nausea, and malaise. Emesis has improved but with persistent emesis. Pt was the hospital in December for similar sx, did have evaluation and bloodwork was normal, did not have any imaging at that time. Pt states sx did resolved with symptomatic tx, bentyl. Pt has had some loose bowels. No blood. Pt not keeping things down, pt is throwing up everything she is drinking. Not eating at all. 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 They have had stomach flu, and also influenza, as well as C.diff. No recent C.diff in the past few weeks. No sick contacts at home      Except as noted above in the history of present illness, the review of systems is  negative for headache, vision changes, chest pain, shortness of breath, abdominal pain, urinary sx, bowel changes. Past medical, surgical, and social history reviewed and updated  Medications and allergies reviewed and updated         O: /84   Pulse 96   Ht 5' 1\" (1.549 m)   Wt 170 lb 12.8 oz (77.5 kg)   LMP  (Exact Date)   SpO2 97%   BMI 32.27 kg/m²   GEN: No acute distress, cooperative, well nourished, alert. HEENT: PEERLA, EOMI , normocephalic/atraumatic, nares and oropharynx clear. Mucous membranes normal, Tympanic membranes clear bilaterally. Neck: soft, supple, no thyromegaly, mass, no Lymphadenopathy  CV: Regular rate and rhythm, no murmur, rubs, gallops. No edema. Resp: Clear to auscultation bilaterally good air entry bilaterally  No crackles, wheeze. Breathing comfortably. Abd: soft, mild generalized tender to palpation. normoactive bowels sounds. No hepatosplenomegaly  No costovertebral angle tenderness, mass. Mild increase in LLQ tender to palpation         ASSESSMENT / PLAN:    1.  AGE (acute gastroenteritis)  Monitor with BRAT diet  The patient is reassured that these symptoms do not appear to represent a serious or threatening condition. Mild ? Secondary diverticulitis, tx as below    2. Mild dehydration  Cont over the counter/symptomatic treatment. Follow up for persistent symptoms in 7 to 10 days or sooner for worsening symptomatology   Monitor with BRAT diet, and fluid intake as tolerated  zofran 4mg TID prn nausea    3. Acute URI  C/w viral URI  Cont over the counter/symptomatic treatment. Follow up for persistent symptoms in 7 to 10 days or sooner for worsening symptomatology     4. Elevated blood pressure reading  Recheck normal, controlled    5. LLQ abdominal pain  ? If related to AGE vs mild diverticulitis  Reviewed prior colonoscopy showing + sigmoid diverticuli  Discussed tx options. cipro 500mg BID x 10d  F/u increased sx severity           Follow-up appointment:   Call or return to clinic prn if these symptoms worsen or fail to improve as anticipated. Discussed use, benefit, and side effects of all prescribed medications. Barriers to medication compliance addressed. All patient questions answered. Pt voiced understanding. When applicable, patient's outside records were reviewed through [facility]. The patient has signed appropriate paperworks/consents.

## 2020-05-21 ENCOUNTER — VIRTUAL VISIT (OUTPATIENT)
Dept: FAMILY MEDICINE CLINIC | Age: 55
End: 2020-05-21
Payer: COMMERCIAL

## 2020-05-21 VITALS — BODY MASS INDEX: 33.63 KG/M2 | WEIGHT: 178 LBS

## 2020-05-21 PROCEDURE — G8427 DOCREV CUR MEDS BY ELIG CLIN: HCPCS | Performed by: FAMILY MEDICINE

## 2020-05-21 PROCEDURE — 3017F COLORECTAL CA SCREEN DOC REV: CPT | Performed by: FAMILY MEDICINE

## 2020-05-21 PROCEDURE — 99214 OFFICE O/P EST MOD 30 MIN: CPT | Performed by: FAMILY MEDICINE

## 2020-05-21 RX ORDER — HYDROCODONE BITARTRATE AND ACETAMINOPHEN 5; 325 MG/1; MG/1
1 TABLET ORAL EVERY 8 HOURS PRN
Qty: 20 TABLET | Refills: 0 | Status: SHIPPED | OUTPATIENT
Start: 2020-05-21 | End: 2020-09-08 | Stop reason: SDUPTHER

## 2020-05-21 RX ORDER — DEXTROAMPHETAMINE SACCHARATE, AMPHETAMINE ASPARTATE MONOHYDRATE, DEXTROAMPHETAMINE SULFATE AND AMPHETAMINE SULFATE 7.5; 7.5; 7.5; 7.5 MG/1; MG/1; MG/1; MG/1
30 CAPSULE, EXTENDED RELEASE ORAL DAILY
Qty: 30 CAPSULE | Refills: 0 | Status: SHIPPED | OUTPATIENT
Start: 2020-05-21 | End: 2020-06-22 | Stop reason: SDUPTHER

## 2020-05-21 RX ORDER — DULOXETIN HYDROCHLORIDE 60 MG/1
60 CAPSULE, DELAYED RELEASE ORAL DAILY
Qty: 30 CAPSULE | Refills: 5 | Status: SHIPPED | OUTPATIENT
Start: 2020-05-21 | End: 2021-03-10

## 2020-05-21 RX ORDER — ONDANSETRON 4 MG/1
4 TABLET, FILM COATED ORAL EVERY 8 HOURS PRN
Qty: 30 TABLET | Refills: 1 | Status: SHIPPED | OUTPATIENT
Start: 2020-05-21 | End: 2021-01-26

## 2020-05-21 NOTE — PROGRESS NOTES
Mouth/Throat: Mucous membranes are moist.     External Ears [] Normal  [] Abnormal-     Neck: [x] No visualized mass     Pulmonary/Chest: [x] Respiratory effort normal.  [x] No visualized signs of difficulty breathing or respiratory distress        [] Abnormal-      Musculoskeletal:   [] Normal gait with no signs of ataxia         [] Normal range of motion of neck        [] Abnormal-       Neurological:        [] No Facial Asymmetry (Cranial nerve 7 motor function) (limited exam to video visit)          [] No gaze palsy        [] Abnormal-         Skin:        [] No significant exanthematous lesions or discoloration noted on facial skin         [] Abnormal-            Psychiatric:       [x] Normal Affect [x] No Hallucinations        [] Abnormal-     Other pertinent observable physical exam findings-     Due to this being a TeleHealth encounter, evaluation of the following organ systems is limited: Vitals/Constitutional/EENT/Resp/CV/GI//MS/Neuro/Skin/Heme-Lymph-Imm. ASSESSMENT/PLAN:      1. Acute diverticulitis  Resolved s/p abx  Reviewed ER visit,  Cont supportive therapy    2. Mental status change resolved  No current sx, monitor  Reviewed neg ER evaluation    3. Attention deficit hyperactivity disorder (ADHD), predominantly inattentive type  Increased off meds  Discussed tx options. Start adderall XR 30mg qd  refills given x 1 month  F/u 1 month/prn    4. Major depressive disorder, recurrent episode, mild (HCC)  Mild to moderate breakthru sx , will resume cymbalta 60mg qd    5. Irritable bowel syndrome with constipation  Symptomatic tx discussed  UTD colonoscopy  Add over the counter fiber supplementation and consider miralax    6. Screen breast cancer  Due f/u mammo  Order given, pt will call to set up             Follow-up appointment:   1 month/prn    Discussed use, benefit, and side effects of all prescribed medications. Barriers to medication compliance addressed. All patient questions answered.   Pt

## 2020-05-22 ENCOUNTER — TELEPHONE (OUTPATIENT)
Dept: FAMILY MEDICINE CLINIC | Age: 55
End: 2020-05-22

## 2020-05-26 NOTE — TELEPHONE ENCOUNTER
PA submitted via CMM for Amphetamine-Dextroamphet ER 30MG er capsules.   Key: [id number] - PA Case ID: [id number] - Rx #: [id number]    STATUS: PENDING

## 2020-06-18 ENCOUNTER — TELEPHONE (OUTPATIENT)
Dept: ADMINISTRATIVE | Age: 55
End: 2020-06-18

## 2020-06-22 ENCOUNTER — VIRTUAL VISIT (OUTPATIENT)
Dept: FAMILY MEDICINE CLINIC | Age: 55
End: 2020-06-22
Payer: COMMERCIAL

## 2020-06-22 PROBLEM — K57.90 DIVERTICULOSIS: Status: ACTIVE | Noted: 2020-06-22

## 2020-06-22 PROCEDURE — G8427 DOCREV CUR MEDS BY ELIG CLIN: HCPCS | Performed by: FAMILY MEDICINE

## 2020-06-22 PROCEDURE — 99214 OFFICE O/P EST MOD 30 MIN: CPT | Performed by: FAMILY MEDICINE

## 2020-06-22 PROCEDURE — 3017F COLORECTAL CA SCREEN DOC REV: CPT | Performed by: FAMILY MEDICINE

## 2020-06-22 RX ORDER — HYDROXYZINE HYDROCHLORIDE 25 MG/1
25 TABLET, FILM COATED ORAL EVERY 8 HOURS PRN
Qty: 30 TABLET | Refills: 5 | Status: SHIPPED | OUTPATIENT
Start: 2020-06-22 | End: 2021-07-21 | Stop reason: SDUPTHER

## 2020-06-22 RX ORDER — DEXTROAMPHETAMINE SACCHARATE, AMPHETAMINE ASPARTATE MONOHYDRATE, DEXTROAMPHETAMINE SULFATE AND AMPHETAMINE SULFATE 7.5; 7.5; 7.5; 7.5 MG/1; MG/1; MG/1; MG/1
30 CAPSULE, EXTENDED RELEASE ORAL DAILY
Qty: 30 CAPSULE | Refills: 0 | Status: SHIPPED | OUTPATIENT
Start: 2020-06-22 | End: 2021-01-26

## 2020-06-22 NOTE — PROGRESS NOTES
Here for virtual visit and f/u of recurrent issues of diverticulitis. Pt has had several bouts of infection, and was seen in hospital 2/2020 for acute episode of diverticultis. At that time, did have CT scan confirming mild diverticulitis. Pt states that things have been doing better. Pt has been off work several times over the past few weeks with a mild flare of sx. Pt does want to have FMLA for her flares of diverticulitis. Pt finds that she gets them intermittently and are not triggered by anything. Does not feel related to diet. Pt does have moderate stress at work d/t COVID pandemic, and it has adjusted her role at work and is extremely stressful. Pt also does have IBS and has a hard time determining if sx are related to IBS vs flare of diverticulitis. pts last colonoscopy was 7/2018    Mood doing despite stressors. Pt continue on her cymbalta and it is helpful, but does have some flares d/t stress. Pt would like to discuss further treatment options. Does not have any desire for counseling at this time. 6/22/2020    TELEHEALTH EVALUATION -- Audio/Visual (During ZJPNC-33 public health emergency)      Due to [place] 19 outbreak, patient's office visit was converted to a virtual visit. Patient was contacted and agreed to proceed with a virtual visit via [employer] [facility] Sharif Martel Visit  The risks and benefits of converting to a virtual visit were discussed in light of the current infectious disease epidemic. Patient also understood that insurance coverage and co-pays are up to their individual insurance plans. As above    Review of Systems  Except as noted above in the history of present illness, the review of systems is  negative for headache, vision changes, chest pain, shortness of breath, abdominal pain, urinary sx, bowel changes.     Past medical, surgical, and social history reviewed and updated  Medications and allergies reviewed and updated        Social History     Tobacco Use    Smoking status: Never Smoker    Smokeless tobacco: Never Used   Substance Use Topics    Alcohol use: Yes     Alcohol/week: 1.0 standard drinks     Types: 1 Glasses of wine per week     Comment: once a week    Drug use: No        Current Outpatient Medications   Medication Sig Dispense Refill    hydrOXYzine (ATARAX) 25 MG tablet Take 1 tablet by mouth every 8 hours as needed for Anxiety 30 tablet 5    amphetamine-dextroamphetamine (ADDERALL XR) 30 MG extended release capsule Take 1 capsule by mouth daily for 30 days. 30 capsule 0    DULoxetine (CYMBALTA) 60 MG extended release capsule Take 1 capsule by mouth daily 30 capsule 5    ondansetron (ZOFRAN) 4 MG tablet Take 1 tablet by mouth every 8 hours as needed for Nausea or Vomiting 30 tablet 1    albuterol sulfate HFA (PROVENTIL HFA) 108 (90 Base) MCG/ACT inhaler Inhale 2 puffs into the lungs every 6 hours as needed for Wheezing 1 Inhaler 3    omeprazole (PRILOSEC) 40 MG delayed release capsule Take 1 capsule by mouth daily 30 capsule 5    ondansetron (ZOFRAN ODT) 4 MG disintegrating tablet Take 1 tablet by mouth every 8 hours as needed for Nausea 20 tablet 0     No current facility-administered medications for this visit. Allergies   Allergen Reactions    Fioricet [Butalbital-Apap-Caffeine] Other (See Comments)     Dizziness. PHYSICAL EXAMINATION:    All boxes checked are findings on exam, empty boxes are negative or not evaluated during the examination  Limitation in exam due to use of video conferencing software, virtual visits    Vital Signs: (As obtained by patient/caregiver or practitioner observation)  There were no vitals taken for this visit.      Constitutional: [x] Appears well-developed and well-nourished [x] No apparent distress      [] Abnormal-   Mental status  [x] Alert and awake  [x] Oriented to person/place/time []Able to follow commands      Eyes:  EOM    []  Normal  [] Abnormal-  Sclera  []  Normal  [] Abnormal -         Discharge []  None visible  [] Abnormal -    HENT:   [x] Normocephalic, atraumatic. [] Abnormal   [] Mouth/Throat: Mucous membranes are moist.     External Ears [] Normal  [] Abnormal-     Neck: [x] No visualized mass     Pulmonary/Chest: [x] Respiratory effort normal.  [x] No visualized signs of difficulty breathing or respiratory distress        [] Abnormal-      Musculoskeletal:   [] Normal gait with no signs of ataxia         [] Normal range of motion of neck        [] Abnormal-       Neurological:        [] No Facial Asymmetry (Cranial nerve 7 motor function) (limited exam to video visit)          [] No gaze palsy        [] Abnormal-         Skin:        [] No significant exanthematous lesions or discoloration noted on facial skin         [] Abnormal-            Psychiatric:       [x] Normal Affect [x] No Hallucinations        [] Abnormal-     Other pertinent observable physical exam findings-     Due to this being a TeleHealth encounter, evaluation of the following organ systems is limited: Vitals/Constitutional/EENT/Resp/CV/GI//MS/Neuro/Skin/Heme-Lymph-Imm. ASSESSMENT/PLAN:      1. Acute diverticulitis  Resolved  Reviewed CT from 2/2020 and will monitor for recurrent sx  FMLA paperwork will be dropped off    2. Diverticulosis  Monitor as above    3. Irritable bowel syndrome with constipation  Cont supportive therapy    4. Major depressive disorder, recurrent episode, mild (HCC)  breakthru anxiety d/t stressors  Discussed tx options. Will cont cymbalta and add atarax 25mg TID prn anxiety  Discussed use, benefit, and side effects of prescribed medications. Barriers to medication compliance addressed. All patient questions answered. Pt voiced understanding. Discussed counseling, will consider    5. ADD  refills given for adderall XR 30mg qd           Follow-up appointment:   Call or return to clinic prn if these symptoms worsen or fail to improve as anticipated.      Discussed use, benefit, and side effects of all prescribed medications. Barriers to medication compliance addressed. All patient questions answered. Pt voiced understanding. When applicable, patient's outside records were reviewed through [facility]. The patient has signed appropriate paperworks/consents. An  electronic signature was used to authenticate this note. --Yudy Hogan MD on 6/22/2020 at 9:35 AM      Pursuant to the emergency declaration under the [address] [place] [address], [facility] waiver authority and the [employer] and [employer] Act, this Virtual  Visit was conducted, with patient's consent, to reduce the patient's risk of exposure to COVID-19 and provide continuity of care for an established patient. Services were provided through a video synchronous discussion virtually to substitute for in-person clinic visit.

## 2020-06-24 ENCOUNTER — TELEPHONE (OUTPATIENT)
Dept: FAMILY MEDICINE CLINIC | Age: 55
End: 2020-06-24

## 2020-06-26 ENCOUNTER — NURSE TRIAGE (OUTPATIENT)
Dept: OTHER | Facility: CLINIC | Age: 55
End: 2020-06-26

## 2020-06-26 NOTE — TELEPHONE ENCOUNTER
Received call from Felicita Hubbard, [address], [place]. Patient called in c/o stated confusion that started 2 days ago, unable to remember what happened this week; patient sounds irritable and anxious, also noted that she has been experiencing abdominal cramping, diarrhea (earlier in the week), vomiting x 15 in the past 24 hours, see triage assessment below. Care Advice provided; patient acknowledged understanding of care advice and is in agreement with plan but with modifications, states she will wait and see. Reinforced importance of going to the ER. Please do not reply to the triage nurse through this encounter. Any subsequent communication should be directly with the patient. Reason for Disposition   SEVERE abdominal pain (e.g., excruciating)    Answer Assessment - Initial Assessment Questions  1. LEVEL OF CONSCIOUSNESS: \"How is he (she, the patient) acting right now? \" (e.g., alert-oriented, confused, lethargic, stuporous, comatose)     Alert and oriented  2. ONSET: \"When did the confusion start? \"  (minutes, hours, days)      2 days ago  3. PATTERN \"Does this come and go, or has it been constant since it started? \"  \"Is it present now? \"      constant  4. ALCOHOL or DRUGS: \"Has he been drinking alcohol or taking any drugs? \"       Denies alcohol or drugs  5. NARCOTIC MEDICATIONS: \"Has he been receiving any narcotic medications? \" (e.g., morphine, Vicodin)      Denies narcotic medications  6. CAUSE: \"What do you think is causing the confusion? \"       Dehydration  7. OTHER SYMPTOMS: Chastity Aleman there any other symptoms? \" (e.g., difficulty breathing, headache, fever, weakness)      Weakness    Answer Assessment - Initial Assessment Questions  1. LOCATION: \"Where does it hurt? \"      Lower abdomen  2. RADIATION: \"Does the pain shoot anywhere else? \" (e.g., chest, back)      Denies radiation but noting chest tightness  3. ONSET: \"When did the pain begin? \" (e.g., minutes, hours or days ago)       2 days ago  4. SUDDEN: \"Gradual or sudden onset? \"      Sudden  5. PATTERN \"Does the pain come and go, or is it constant? \"     - If constant: \"Is it getting better, staying the same, or worsening? \"       (Note: Constant means the pain never goes away completely; most serious pain is constant and it progresses)      - If intermittent: \"How long does it last?\" \"Do you have pain now? \"      (Note: Intermittent means the pain goes away completely between bouts)      Comes and goes in waves  6. SEVERITY: \"How bad is the pain? \"  (e.g., Scale 1-10; mild, moderate, or severe)    - MILD (1-3): doesn't interfere with normal activities, abdomen soft and not tender to touch     - MODERATE (4-7): interferes with normal activities or awakens from sleep, tender to touch     - SEVERE (8-10): excruciating pain, doubled over, unable to do any normal activities       Severe  7. RECURRENT SYMPTOM: \"Have you ever had this type of abdominal pain before? \" If so, ask: \"When was the last time? \" and \"What happened that time? \"      Yes, couple months ago; sent to the hospital  8. CAUSE: \"What do you think is causing the abdominal pain? \"      Diverticulitis  9. RELIEVING/AGGRAVATING FACTORS: \"What makes it better or worse? \" (e.g., movement, antacids, bowel movement)      Relieving - lying down; Worse - eating  10. OTHER SYMPTOMS: \"Has there been any vomiting, diarrhea, constipation, or urine problems? \"        Vomiting x 15 in the past 24 hours; diarrhea (early in the week); current constipation; difficulty urinating, feels like bladder is full at times  11. PREGNANCY: \"Is there any chance you are pregnant? \" \"When was your last menstrual period? \"        NA    Protocols used: ABDOMINAL PAIN - FEMALE-ADULT-OH, CONFUSION - DELIRIUM-ADULT-[place]

## 2020-07-01 ENCOUNTER — TELEPHONE (OUTPATIENT)
Dept: FAMILY MEDICINE CLINIC | Age: 55
End: 2020-07-01

## 2020-07-01 RX ORDER — AMOXICILLIN AND CLAVULANATE POTASSIUM 875; 125 MG/1; MG/1
1 TABLET, FILM COATED ORAL 2 TIMES DAILY
Qty: 20 TABLET | Refills: 0 | Status: SHIPPED | OUTPATIENT
Start: 2020-07-01 | End: 2020-11-03 | Stop reason: SDUPTHER

## 2020-07-01 NOTE — TELEPHONE ENCOUNTER
Received Signed FMLA Forms from  at . Patient portion is still blank, patient needs to complete before can be sent. Called pt, left VM, letting pt know we can mail forms to home address. Awaiting callback. In the meantime, form is scanned and attached to encounter. Original placed in [facility], under last name \"H\".

## 2020-07-01 NOTE — TELEPHONE ENCOUNTER
[facility] received a call from:    Name of Caller: Alexander Perkins    Relationship to patient:self    Organization name: n/a    Best contact number: [phone]    Reason for call: Pt is requesting antibiotic and pain medication.  Please advise

## 2020-07-01 NOTE — TELEPHONE ENCOUNTER
Left message for patient to obtain more information as to why requesting these medications.  Will put back to new

## 2020-07-01 NOTE — TELEPHONE ENCOUNTER
Spoke patient and she said that she is having the abdominal discomfort below her belly button for about two days. She knows it is her diverticulitis and it seems to have gotten worse. She said that she was told that she could just call if it flares up and that a prescription would be called to pharmacy. Patient only has a low grade fever 99 so not really any fever.  Please advise

## 2020-09-08 ENCOUNTER — OFFICE VISIT (OUTPATIENT)
Dept: FAMILY MEDICINE CLINIC | Age: 55
End: 2020-09-08
Payer: COMMERCIAL

## 2020-09-08 VITALS
TEMPERATURE: 97.8 F | HEART RATE: 107 BPM | SYSTOLIC BLOOD PRESSURE: 122 MMHG | WEIGHT: 174.2 LBS | RESPIRATION RATE: 25 BRPM | DIASTOLIC BLOOD PRESSURE: 80 MMHG | OXYGEN SATURATION: 97 % | BODY MASS INDEX: 32.89 KG/M2 | HEIGHT: 61 IN

## 2020-09-08 DIAGNOSIS — R41.3 MEMORY LOSS: ICD-10-CM

## 2020-09-08 DIAGNOSIS — M25.50 ARTHRALGIA OF MULTIPLE JOINTS: ICD-10-CM

## 2020-09-08 LAB
HCT VFR BLD CALC: 42.1 % (ref 36–48)
HEMOGLOBIN: 14 G/DL (ref 12–16)
MCH RBC QN AUTO: 29.9 PG (ref 26–34)
MCHC RBC AUTO-ENTMCNC: 33.3 G/DL (ref 31–36)
MCV RBC AUTO: 89.9 FL (ref 80–100)
PDW BLD-RTO: 13.9 % (ref 12.4–15.4)
PLATELET # BLD: 287 K/UL (ref 135–450)
PMV BLD AUTO: 9.3 FL (ref 5–10.5)
RBC # BLD: 4.68 M/UL (ref 4–5.2)
WBC # BLD: 9.8 K/UL (ref 4–11)

## 2020-09-08 PROCEDURE — 3017F COLORECTAL CA SCREEN DOC REV: CPT | Performed by: FAMILY MEDICINE

## 2020-09-08 PROCEDURE — 99214 OFFICE O/P EST MOD 30 MIN: CPT | Performed by: FAMILY MEDICINE

## 2020-09-08 PROCEDURE — G8427 DOCREV CUR MEDS BY ELIG CLIN: HCPCS | Performed by: FAMILY MEDICINE

## 2020-09-08 PROCEDURE — 1036F TOBACCO NON-USER: CPT | Performed by: FAMILY MEDICINE

## 2020-09-08 PROCEDURE — G8417 CALC BMI ABV UP PARAM F/U: HCPCS | Performed by: FAMILY MEDICINE

## 2020-09-08 RX ORDER — DEXTROAMPHETAMINE SACCHARATE, AMPHETAMINE ASPARTATE MONOHYDRATE, DEXTROAMPHETAMINE SULFATE AND AMPHETAMINE SULFATE 7.5; 7.5; 7.5; 7.5 MG/1; MG/1; MG/1; MG/1
60 CAPSULE, EXTENDED RELEASE ORAL EVERY MORNING
Qty: 60 CAPSULE | Refills: 0 | Status: SHIPPED | OUTPATIENT
Start: 2020-10-08 | End: 2021-01-26

## 2020-09-08 RX ORDER — DEXTROAMPHETAMINE SACCHARATE, AMPHETAMINE ASPARTATE MONOHYDRATE, DEXTROAMPHETAMINE SULFATE AND AMPHETAMINE SULFATE 7.5; 7.5; 7.5; 7.5 MG/1; MG/1; MG/1; MG/1
60 CAPSULE, EXTENDED RELEASE ORAL EVERY MORNING
Qty: 60 CAPSULE | Refills: 0 | Status: SHIPPED | OUTPATIENT
Start: 2020-11-07 | End: 2021-01-26

## 2020-09-08 RX ORDER — HYDROCODONE BITARTRATE AND ACETAMINOPHEN 5; 325 MG/1; MG/1
1 TABLET ORAL EVERY 8 HOURS PRN
Qty: 20 TABLET | Refills: 0 | Status: SHIPPED | OUTPATIENT
Start: 2020-09-08 | End: 2021-03-10 | Stop reason: SDUPTHER

## 2020-09-08 RX ORDER — DEXTROAMPHETAMINE SACCHARATE, AMPHETAMINE ASPARTATE MONOHYDRATE, DEXTROAMPHETAMINE SULFATE AND AMPHETAMINE SULFATE 7.5; 7.5; 7.5; 7.5 MG/1; MG/1; MG/1; MG/1
60 CAPSULE, EXTENDED RELEASE ORAL EVERY MORNING
Qty: 60 CAPSULE | Refills: 0 | Status: SHIPPED | OUTPATIENT
Start: 2020-09-08 | End: 2021-01-26

## 2020-09-08 NOTE — PROGRESS NOTES
Here for f/u and recheck of ADD, mood, memory    Pt states she has been doing ok, but has noted some issues with her memory. Hard time articulating what she wants to say. Mom had dementia and she is worried. Pt was here a few weeks ago for anxiety fu and we did get her started on atarax, but she never picked up. Pt is not having issues with people's names. Can have a hard time with word finding. Stress levels are higher, but does not feel that it is related. Pt does not feel depressed and feels anxiety is controlled. Here for follow up of ADD. Pt states that they are doing very well with current therapy and feels that control of symptoms are adequate at this time. No breakthru symptoms and no need/indication for adjustment in therapy. Taking meds as prescribed and denies any illicit medication usage of misuse of their stimulant thearpy. Mood is stable and denies any issues of depression or anxiety associated with treatment of ADD. Pt has been referred for sleep eval, but has not had done. Pt does snore and does not sleep well. Pt has noted in bilateral upper extremities and bilateral lower extremities arthralgias    MMSE    What is the year, season, date, day, month?  __5__/5    Name, state, county, city, place (office or hospital), and location. __5__/5    Name 3 objects, ask patient to name each immediately. __3__/3    [place] the word \"world\", then spell the word \"world\" backwards. __5__/5    If the patient learned 3 objects above, ask them to recall now. __3__/3    Show the patient two simple objects (e.g. watch pen) and ask the patient to name them. __2__/2    Repeat the phrase \" no if, ands, or buts\". __1__/1    Take the paper in your right hand, fold it in half, and place it on the floor. __3__/3    Merle Pean your eyes\" on a piece of paper, and ask the patient to read it and follow instructions. __1__/1    Make up and write a sentence about anything (must have a noun and verb. __1__/1    Draw two 5 sided figures that overlap one corner, and ask the patient to copy. __0__/1    Score:  __29__/30    In patients over 65 years, a score of 24 or better is normal.      Except as noted above in the history of present illness, the review of systems is  negative for headache, vision changes, chest pain, shortness of breath, abdominal pain, urinary sx, bowel changes. Past medical, surgical, and social history reviewed and updated  Medications and allergies reviewed and updated         O: /80 (Site: Right Upper Arm, Position: Sitting, Cuff Size: Medium Adult)   Pulse 107   Temp 97.8 °F (36.6 °C) (Temporal)   Resp 25   Ht 5' 1\" (1.549 m)   Wt 174 lb 3.2 oz (79 kg)   SpO2 97%   BMI 32.91 kg/m²   GEN: No acute distress, cooperative, well nourished, alert. HEENT: PEERLA, EOMI , normocephalic/atraumatic, nares and oropharynx clear. Mucous membranes normal, Tympanic membranes clear bilaterally. Neck: soft, supple, no thyromegaly, mass, no Lymphadenopathy  CV: Regular rate and rhythm, no murmur, rubs, gallops. No edema. Resp: Clear to auscultation bilaterally good air entry bilaterally  No crackles, wheeze. Breathing comfortably. Psych: moderate stressors. Denies any ST/SI        Current Outpatient Medications   Medication Sig Dispense Refill    HYDROcodone-acetaminophen (NORCO) 5-325 MG per tablet Take 1 tablet by mouth every 8 hours as needed for Pain for up to 7 days. 20 tablet 0    [START ON 11/7/2020] amphetamine-dextroamphetamine (ADDERALL XR) 30 MG extended release capsule Take 2 capsules by mouth every morning for 30 days. 60 capsule 0    [START ON 10/8/2020] amphetamine-dextroamphetamine (ADDERALL XR) 30 MG extended release capsule Take 2 capsules by mouth every morning for 30 days. 60 capsule 0    amphetamine-dextroamphetamine (ADDERALL XR) 30 MG extended release capsule Take 2 capsules by mouth every morning for 30 days.  60 capsule 0    hydrOXYzine (ATARAX) 25 MG tablet Take 1 tablet by mouth every 8 hours as needed for Anxiety 30 tablet 5    DULoxetine (CYMBALTA) 60 MG extended release capsule Take 1 capsule by mouth daily 30 capsule 5    ondansetron (ZOFRAN) 4 MG tablet Take 1 tablet by mouth every 8 hours as needed for Nausea or Vomiting 30 tablet 1    albuterol sulfate HFA (PROVENTIL HFA) 108 (90 Base) MCG/ACT inhaler Inhale 2 puffs into the lungs every 6 hours as needed for Wheezing 1 Inhaler 3    omeprazole (PRILOSEC) 40 MG delayed release capsule Take 1 capsule by mouth daily 30 capsule 5    ondansetron (ZOFRAN ODT) 4 MG disintegrating tablet Take 1 tablet by mouth every 8 hours as needed for Nausea 20 tablet 0    amphetamine-dextroamphetamine (ADDERALL XR) 30 MG extended release capsule Take 1 capsule by mouth daily for 30 days. 30 capsule 0     No current facility-administered medications for this visit. ASSESSMENT / PLAN:    1. Irritable bowel syndrome with constipation  Stable w/o flare  Cont supportive therapy    2. Major depressive disorder, recurrent episode, mild (HCC)  Mood stable despite stressors  Cont supportive therapy    3. Attention deficit hyperactivity disorder (ADHD), predominantly inattentive type  refills given as below  Pt aware of need for every 3 month medication followup appointments, and that medication refills for benzodiazepines, narcotics and/or stimulants will only be given at appointment. - amphetamine-dextroamphetamine (ADDERALL XR) 30 MG extended release capsule; Take 2 capsules by mouth every morning for 30 days. Dispense: 60 capsule; Refill: 0  - amphetamine-dextroamphetamine (ADDERALL XR) 30 MG extended release capsule; Take 2 capsules by mouth every morning for 30 days. Dispense: 60 capsule; Refill: 0  - amphetamine-dextroamphetamine (ADDERALL XR) 30 MG extended release capsule; Take 2 capsules by mouth every morning for 30 days. Dispense: 60 capsule; Refill: 0    4.  SK (seborrheic keratosis)  The patient is reassured that these symptoms do not appear to represent a serious or threatening condition. 5. Snoring  Strongly suspect obstructive sleep apnea  Has been referred for sleep study but has not set up  Encouraged to set up testing  - Clarice Del Toro MD, Pulmonary, [place]    6. Hyperglycemia  Check bloodwork for a1c  Management pending results. 7. Arthralgia of multiple joints  Exam nonfocal  Check bloodwork to r/o rheum or connective tissue disease   - Sedimentation Rate; Future  - YAYA; Future  - Rheumatoid Factor; Future  - C-Reactive Protein; Future    8. Sleep disturbance  Refer for sleep study  - Clarice Del Toro MD, Pulmonary, [place]    9. DDD (degenerative disc disease), lumbar  Chronic persistent sx  Did not get benefit from epidural steroid injection  Has appt next week with dr. Valorie Paulino  Management pending results. 10. Memory loss  Exam nonfocal  MMSE 29/30  Suspect d/t stressors vs untreated obstructive sleep apnea  Check bw as below  Consider MRI brain  - CBC; Future  - Comprehensive Metabolic Panel; Future  - TSH without Reflex; Future  - T4, Free; Future  - Vitamin B12 & Folate; Future  - Sedimentation Rate; Future  - Hemoglobin A1C; Future  - RPR REFLEX; Future    11. Spondylolisthesis of lumbar region  As above  refills given for short course of norco as below  See [facility]  - HYDROcodone-acetaminophen (NORCO) 5-325 MG per tablet; Take 1 tablet by mouth every 8 hours as needed for Pain for up to 7 days. Dispense: 20 tablet; Refill: 0    12. Spondylosis of lumbar region without myelopathy or radiculopathy  As above  Await eval by dr. Valorie Paulino  - HYDROcodone-acetaminophen (NORCO) 5-325 MG per tablet; Take 1 tablet by mouth every 8 hours as needed for Pain for up to 7 days. Dispense: 20 tablet; Refill: 0           Follow-up appointment:   Pending bloodwork reuslts/prn    Discussed use, benefit, and side effects of all prescribed medications.   Barriers to medication compliance addressed. All patient questions answered. Pt voiced understanding. When applicable, [facility]'s outside records were [facility]. The patient has signed appropriate paperworks/consents.

## 2020-09-09 LAB
A/G RATIO: 1.8 (ref 1.1–2.2)
ALBUMIN SERPL-MCNC: 4.8 G/DL (ref 3.4–5)
ALP BLD-CCNC: 88 U/L (ref 40–129)
ALT SERPL-CCNC: 18 U/L (ref 10–40)
ANION GAP SERPL CALCULATED.3IONS-SCNC: 15 MMOL/L (ref 3–16)
ANTI-NUCLEAR ANTIBODY (ANA): NEGATIVE
AST SERPL-CCNC: 16 U/L (ref 15–37)
BILIRUB SERPL-MCNC: <0.2 MG/DL (ref 0–1)
BUN BLDV-MCNC: 17 MG/DL (ref 7–20)
C-REACTIVE PROTEIN: 14 MG/L (ref 0–5.1)
CALCIUM SERPL-MCNC: 9.8 MG/DL (ref 8.3–10.6)
CHLORIDE BLD-SCNC: 102 MMOL/L (ref 99–110)
CO2: 23 MMOL/L (ref 21–32)
CREAT SERPL-MCNC: 0.8 MG/DL (ref 0.6–1.1)
ESTIMATED AVERAGE GLUCOSE: 122.6 MG/DL
FOLATE: 16.6 NG/ML (ref 4.78–24.2)
GFR AFRICAN AMERICAN: >60
GFR NON-AFRICAN AMERICAN: >60
GLOBULIN: 2.6 G/DL
GLUCOSE BLD-MCNC: 137 MG/DL (ref 70–99)
HBA1C MFR BLD: 5.9 %
POTASSIUM SERPL-SCNC: 4 MMOL/L (ref 3.5–5.1)
RHEUMATOID FACTOR: <10 IU/ML
SEDIMENTATION RATE, ERYTHROCYTE: 24 MM/HR (ref 0–30)
SODIUM BLD-SCNC: 140 MMOL/L (ref 136–145)
T4 FREE: 0.9 NG/DL (ref 0.9–1.8)
TOTAL PROTEIN: 7.4 G/DL (ref 6.4–8.2)
TOTAL SYPHILLIS IGG/IGM: NORMAL
TSH SERPL DL<=0.05 MIU/L-ACNC: 4.58 UIU/ML (ref 0.27–4.2)
VITAMIN B-12: 524 PG/ML (ref 211–911)

## 2020-09-16 ENCOUNTER — TELEPHONE (OUTPATIENT)
Dept: FAMILY MEDICINE CLINIC | Age: 55
End: 2020-09-16

## 2020-09-16 NOTE — TELEPHONE ENCOUNTER
Patient calling for her results. She was given the message. She stated she is still concerned about her memory loss. She just started back on her ADHD medication and she is going to give it a bit more time. She was encouraged to call if no change.

## 2020-09-23 ENCOUNTER — OFFICE VISIT (OUTPATIENT)
Dept: ORTHOPEDIC SURGERY | Age: 55
End: 2020-09-23
Payer: COMMERCIAL

## 2020-09-23 VITALS — BODY MASS INDEX: 32.85 KG/M2 | WEIGHT: 174 LBS | HEIGHT: 61 IN

## 2020-09-23 PROCEDURE — G8417 CALC BMI ABV UP PARAM F/U: HCPCS | Performed by: PHYSICIAN ASSISTANT

## 2020-09-23 PROCEDURE — 99203 OFFICE O/P NEW LOW 30 MIN: CPT | Performed by: PHYSICIAN ASSISTANT

## 2020-09-23 PROCEDURE — G8427 DOCREV CUR MEDS BY ELIG CLIN: HCPCS | Performed by: PHYSICIAN ASSISTANT

## 2020-09-23 PROCEDURE — 3017F COLORECTAL CA SCREEN DOC REV: CPT | Performed by: PHYSICIAN ASSISTANT

## 2020-09-23 PROCEDURE — 1036F TOBACCO NON-USER: CPT | Performed by: PHYSICIAN ASSISTANT

## 2020-09-23 RX ORDER — CYCLOBENZAPRINE HCL 10 MG
10 TABLET ORAL 2 TIMES DAILY PRN
Qty: 30 TABLET | Refills: 0 | Status: SHIPPED | OUTPATIENT
Start: 2020-09-23 | End: 2020-11-23 | Stop reason: SDUPTHER

## 2020-09-23 NOTE — PROGRESS NOTES
History of present illness:   Ms. Zohra Beck  is a pleasant 53 y.o. female with a PMH of lumbar DDD, migraines, acid reflux, ADHD, IBS, palpitations, and depressive disorder kindly referred by Dr. Mikey Skinner for consultation regarding her LBP and left leg pain. She states her pain began insidiously about 8 years ago. Her pain has steadily increased since then. She rates her back pain 9/10 and leg pain 6/10. She describes the back pain as constant and aching that is worse with sitting, standing, rising from sitting and leaning forward and better with walking and lying down. 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 The leg pain is more intermittent and radiates down the anterior aspect of her leg to her foot. She admits numbness and tingling in her left leg. She denies progressive weakness of her left leg and denies bowel or bladder dysfunction. She has tried 2 epidural injections, the first providing 6-9 months relief and then the most recent 2 years ago with Dr. Marcie Mendieta without relief. Has tried physical therapy in the past with temporary relief. She has recently tried [place] from her PCP. Past medical history:  Her past medical history has been reviewed and is significant for  lumbar DDD, migraines, acid reflux, ADHD, IBS, palpitations, and depressive disorder. Past surgical history:  Her past surgical history has been reviewed and is non-contributory to her present illness. Her medications and allergies were reviewed. Social history:  Her social history has been reviewed and she denies smoking history. Current Medication:  Current Outpatient Medications   Medication Sig Dispense Refill    [START ON 11/7/2020] amphetamine-dextroamphetamine (ADDERALL XR) 30 MG extended release capsule Take 2 capsules by mouth every morning for 30 days.  60 capsule 0    [START ON 10/8/2020] amphetamine-dextroamphetamine (ADDERALL XR) 30 MG extended release capsule Take 2 capsules by mouth every morning for 30 days. 60 capsule 0    amphetamine-dextroamphetamine (ADDERALL XR) 30 MG extended release capsule Take 2 capsules by mouth every morning for 30 days. 60 capsule 0    hydrOXYzine (ATARAX) 25 MG tablet Take 1 tablet by mouth every 8 hours as needed for Anxiety 30 tablet 5    amphetamine-dextroamphetamine (ADDERALL XR) 30 MG extended release capsule Take 1 capsule by mouth daily for 30 days. 30 capsule 0    DULoxetine (CYMBALTA) 60 MG extended release capsule Take 1 capsule by mouth daily 30 capsule 5    ondansetron (ZOFRAN) 4 MG tablet Take 1 tablet by mouth every 8 hours as needed for Nausea or Vomiting 30 tablet 1    albuterol sulfate HFA (PROVENTIL HFA) 108 (90 Base) MCG/ACT inhaler Inhale 2 puffs into the lungs every 6 hours as needed for Wheezing 1 Inhaler 3    omeprazole (PRILOSEC) 40 MG delayed release capsule Take 1 capsule by mouth daily 30 capsule 5    ondansetron (ZOFRAN ODT) 4 MG disintegrating tablet Take 1 tablet by mouth every 8 hours as needed for Nausea 20 tablet 0     No current facility-administered medications for this visit. Review of symptoms:  Patient's review of symptoms was reviewed and is significant for back pain and negative for recent weight loss, fatigue, chills, visual disturbances, blood in stool or urine, recent infection, chest pain, or shortness of breath. All other ROS were negative. Physical examination:  Ms. Jorge Smith's most recent vitals: There is no height or weight on file to calculate BMI. General exam:  She is well-developed and well-nourished, is in obvious pain and alert and oriented to person, place, and time. She demonstrates appropriate mood and affect. Her skin is warm and dry. Her gait is normal and she walks heel to toe without limp or instability. Back:  She stands with slight lumbar flexion. Her lumbar flexion, extension and lateral bending are moderately reduced with pain.  She has mild tenderness over her lumbar spine without obvious muscle spasm. The skin over her lumbar spine is normal without a surgical scar. Lower extremities:  She has 5/5 motor strength of bilateral lower extremities. She has a negative straight leg raise, bilaterally. Deep tendon reflexes at knees and achilles are 2+. Sensation is intact to light touch L3 to S1 bilaterally. She has no clonus. Hip range of motion painless. Imaging:  Reviewed AP and lateral xray images of her lumbar spine obtained in the office today. They show mild spondylolisthesis at L4-5 with degenerative disc changes. No evidence of fracture. I reviewed MRI images of her lumbar spine from 4/23/2018. They note grade 1 spondylolisthesis L4 on L5 with severe bilateral facet arthrosis. Assessment:  Lumbar spondylolisthesis  Lumbar DDD    Plan:  We discussed treatment options including observation, physical therapy, additional epidural injections and new imaging. She would like to proceed with new lumbar MRI as her pain has increased since her last imaging. She ultimately would like to try additional epidural injection.      Clyed Flores PA-C

## 2020-09-24 ENCOUNTER — TELEPHONE (OUTPATIENT)
Dept: ORTHOPEDIC SURGERY | Age: 55
End: 2020-09-24

## 2020-09-24 NOTE — TELEPHONE ENCOUNTER
PA submitted via CMM for Amphetamine-Dextroamphet ER 30MG er capsules.   Key: [id number] - PA Case ID: [id number] - Rx #: [id number]    STATUS: PENDING

## 2020-09-28 ENCOUNTER — TELEPHONE (OUTPATIENT)
Dept: ORTHOPEDIC SURGERY | Age: 55
End: 2020-09-28

## 2020-09-28 NOTE — TELEPHONE ENCOUNTER
Received APPROVAL for Amphetamine-Dextroamphet ER 30MG er capsules from 09/24/2020 through 09/24/2021. Approval letter attached. Please advise patient. Thank you.

## 2020-10-16 ENCOUNTER — HOSPITAL ENCOUNTER (OUTPATIENT)
Dept: MRI IMAGING | Age: 55
Discharge: HOME | End: 2020-10-16
Payer: COMMERCIAL

## 2020-10-16 PROCEDURE — 72148 MRI LUMBAR SPINE W/O DYE: CPT

## 2020-10-19 ENCOUNTER — TELEPHONE (OUTPATIENT)
Dept: ORTHOPEDIC SURGERY | Age: 55
End: 2020-10-19

## 2020-10-20 NOTE — TELEPHONE ENCOUNTER
Left  for patient to return my call to go over her MRI results.  When patient return my call please IM me to see if I am available to speak with or if I am not then please inform patient I will call her back as I am busy in clinic. - Thanks

## 2020-10-22 NOTE — TELEPHONE ENCOUNTER
Left VM for patient for her to call me back to go over her results. I did inform her in the VM that this is my 3rd attempt in call her and at this point forward I will wait for her to return my call to go over any results. Ask her at her convenience to please call back and ask to speak to me in regards to the results and I would be happy to go over them with her. EDU [place]    Caller: Unspecified (3 days ago, 11:29 AM)               Patient's lumbar MRI shows mild central canal stenosis at L4-5 where her known spondylolisthesis is with broad based disc protrusions L2-3 and L3-4. She is welcome to try additional MARIAMA as discussed in office. Thanks.

## 2020-10-27 ENCOUNTER — TELEPHONE (OUTPATIENT)
Dept: ORTHOPEDIC SURGERY | Age: 55
End: 2020-10-27

## 2020-10-27 NOTE — TELEPHONE ENCOUNTER
Test Results     Type of Test: MRI   Date of Test:   Location of Test:   Patient Contact Number: [phone]  3 [facility]

## 2020-10-27 NOTE — TELEPHONE ENCOUNTER
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 She stated she will call back.

## 2020-10-27 NOTE — TELEPHONE ENCOUNTER
Attempted to call back patient to go over results. Patient didn't answer left VM for patient to return my call at her earliest convenience.

## 2020-10-28 NOTE — TELEPHONE ENCOUNTER
EDU Hadley    Caller: Unspecified (3 days ago, 11:29 AM)                Patient's lumbar MRI shows mild central canal stenosis at L4-5 where her known spondylolisthesis is with broad based disc protrusions L2-3 and L3-4. She is welcome to try additional MARIAMA as discussed in office. Thanks. Patient called back and we reviewed her MRI results. Patient has opted into trying additional MARIAMA. She wanted a new referral to a different doctor than [facility]. A referral was placed to Dr. Lety Weinberg.  She will call back with any questions

## 2020-11-03 ENCOUNTER — HOSPITAL ENCOUNTER (OUTPATIENT)
Age: 55
Discharge: HOME | End: 2020-11-03
Payer: COMMERCIAL

## 2020-11-03 ENCOUNTER — OFFICE VISIT (OUTPATIENT)
Dept: FAMILY MEDICINE CLINIC | Age: 55
End: 2020-11-03
Payer: COMMERCIAL

## 2020-11-03 ENCOUNTER — HOSPITAL ENCOUNTER (OUTPATIENT)
Dept: GENERAL RADIOLOGY | Age: 55
Discharge: HOME | End: 2020-11-03
Payer: COMMERCIAL

## 2020-11-03 VITALS
DIASTOLIC BLOOD PRESSURE: 82 MMHG | HEART RATE: 97 BPM | SYSTOLIC BLOOD PRESSURE: 123 MMHG | TEMPERATURE: 98.1 F | RESPIRATION RATE: 23 BRPM | HEIGHT: 61 IN | BODY MASS INDEX: 32.66 KG/M2 | WEIGHT: 173 LBS | OXYGEN SATURATION: 97 %

## 2020-11-03 PROCEDURE — 90471 IMMUNIZATION ADMIN: CPT | Performed by: FAMILY MEDICINE

## 2020-11-03 PROCEDURE — G8482 FLU IMMUNIZE ORDER/ADMIN: HCPCS | Performed by: FAMILY MEDICINE

## 2020-11-03 PROCEDURE — 99396 PREV VISIT EST AGE 40-64: CPT | Performed by: FAMILY MEDICINE

## 2020-11-03 PROCEDURE — 90686 IIV4 VACC NO PRSV 0.5 ML IM: CPT | Performed by: FAMILY MEDICINE

## 2020-11-03 PROCEDURE — 72040 X-RAY EXAM NECK SPINE 2-3 VW: CPT

## 2020-11-03 RX ORDER — AMOXICILLIN AND CLAVULANATE POTASSIUM 875; 125 MG/1; MG/1
1 TABLET, FILM COATED ORAL 2 TIMES DAILY
Qty: 20 TABLET | Refills: 0 | Status: SHIPPED | OUTPATIENT
Start: 2020-11-03 | End: 2020-11-13

## 2020-11-03 NOTE — PROGRESS NOTES
Here for well checkup, physical.  Pt states that things are doing ok, doing better with decrease in anxiety. Pt states her anxiety is doing better, looking at possibly transferring to a different position at work. Pt feels that when she is active, gets flares of her ddd lumbar spine. Pt is working with Dr. Pippa Espitia and dr. Lynda Rao for her chronic degenerative disc disease lumbar spine. Had MRI 10/2020 showing severe levels of degenerative disc disease but without any significant issues. Does find that she is active at work and does need to do lifting at times. They discussed consideration of physical therapy, but she has done in the past.  Pt was going to get epidural steroid injection but wanted to get another opinion and is in the process of setting up an appointment with dr. Briana Jane. Pt does have some neck issues and it has started to bother her in the past few months. Pt did not know if she was sleeping wrong. Sx are R sided and rarely will get into RUE. Does have sx intermittently. Pt did have some bloodwork showing mild increase in blood sugars, a1c at 5.9. Pt does want to work on improving lifestyle. Pt is not exercising consistently due to back pain issues and being busy. Pt does eat a sweets, and does eat a decent amt of carbs. Pt does have hx of diverticular dz and has had flare of dirverticulitis in the past, and does have intermittent sx.   Pt does not have sx currently      Except as noted in the history of present illness as above, the review of systems is negative for the following:    General ROS: negative  Psychological ROS: negative  Allergy and Immunology ROS: negative  Hematological and Lymphatic ROS: negative  Respiratory ROS: no cough, shortness of breath, or wheezing  Cardiovascular ROS: no chest pain or dyspnea on exertion  Gastrointestinal ROS: no abdominal pain, change in bowel habits, or black or bloody stools  Genito-Urinary ROS: no dysuria, trouble voiding, or hematuria  Musculoskeletal ROS: negative  Dermatological ROS: negative      Past medical, surgical, and social history reviewed and updated. Medications and allergies reviewed and updated        /82 (Site: Left Upper Arm, Position: Sitting, Cuff Size: Medium Adult)   Pulse 97   Temp 98.1 °F (36.7 °C) (Temporal)   Resp 23   Ht 5' 1\" (1.549 m)   Wt 173 lb (78.5 kg)   SpO2 97%   BMI 32.69 kg/m²   General appearance - healthy, alert, no distress  Skin - Skin color, texture, turgor normal. No rashes or lesions. No suspicious findings  Head - Normocephalic. No masses, lesions, tenderness or abnormalities  Eyes - conjunctivae/corneas clear. Pupils equal and reactive to light and accomodation, extraocular muscles intact. Ears - External ears normal. Canals clear. Tympanic membranes normal bilaterally. Nose/Sinuses - Nares normal. Septum midline. Mucosa normal. No drainage or sinus tenderness. Oropharynx - Lips, mucosa, and tongue normal. Teeth and gums normal.   Neck - Neck supple. No adenopathy. Thyroid symmetric, normal size, no carotid bruit bilaterally  Back - Back symmetric, no curvature. Range of motion normal. No Costovertebral angle tenderness. Lungs - Percussion normal. Good diaphragmatic excursion. Lungs clear without wheeze, rales, crackles  Heart - Regular rate and rhythm, with no rub, murmur or gallop noted. Abdomen - Abdomen soft, non-tender. Bowel sounds normal. No masses, tenderness or organomegaly  Extremities - Extremities normal. No deformities, edema, or skin discoloration  Musculoskeletal - Spine ROM normal. Muscular strength intact. Peripheral pulses - radial=4/4,, femoral=4/4, popliteal=4/4, dorsalis pedis=4/4,  Neuro - Gait normal. Reflexes normal and symmetric. Sensation grossly normal.  No focal weakness  Psych - euthymic, no suicidal thoughts or ideation, mood stable.         Current Outpatient Medications   Medication Sig Dispense Refill    amoxicillin-clavulanate (AUGMENTIN) 875-125 MG per tablet Take 1 tablet by mouth 2 times daily for 10 days 20 tablet 0    [START ON 11/7/2020] amphetamine-dextroamphetamine (ADDERALL XR) 30 MG extended release capsule Take 2 capsules by mouth every morning for 30 days. 60 capsule 0    amphetamine-dextroamphetamine (ADDERALL XR) 30 MG extended release capsule Take 2 capsules by mouth every morning for 30 days. 60 capsule 0    hydrOXYzine (ATARAX) 25 MG tablet Take 1 tablet by mouth every 8 hours as needed for Anxiety 30 tablet 5    DULoxetine (CYMBALTA) 60 MG extended release capsule Take 1 capsule by mouth daily 30 capsule 5    ondansetron (ZOFRAN) 4 MG tablet Take 1 tablet by mouth every 8 hours as needed for Nausea or Vomiting 30 tablet 1    albuterol sulfate HFA (PROVENTIL HFA) 108 (90 Base) MCG/ACT inhaler Inhale 2 puffs into the lungs every 6 hours as needed for Wheezing 1 Inhaler 3    omeprazole (PRILOSEC) 40 MG delayed release capsule Take 1 capsule by mouth daily 30 capsule 5    ondansetron (ZOFRAN ODT) 4 MG disintegrating tablet Take 1 tablet by mouth every 8 hours as needed for Nausea 20 tablet 0    amphetamine-dextroamphetamine (ADDERALL XR) 30 MG extended release capsule Take 2 capsules by mouth every morning for 30 days. 60 capsule 0    amphetamine-dextroamphetamine (ADDERALL XR) 30 MG extended release capsule Take 1 capsule by mouth daily for 30 days. 30 capsule 0     No current facility-administered medications for this visit. ASSESSMENT / PLAN:    1. Routine general medical examination at a health care facility  No focal abnormalities on exam  Anticipatory guidance discussed. Check bloodwork for insurance form  - Hemoglobin A1C; Future  - Basic Metabolic Panel; Future  - Lipid Panel; Future    2. Major depressive disorder, recurrent episode, mild (HCC)  Mood stable, cont supportive therapy    3.  DDD (degenerative disc disease), lumbar  Reviewed MRI, will monitor with supportive therapy, range of motion exercises  Has appt with dr. Noah Ndiaye next week for f/u and consideration of epidural steroid injection     4. Chronic neck pain  Monitor with range of motion exercises  Check xray cervical spine  Management pending results. - XR CERVICAL SPINE (2-3 VIEWS); Future    5. Hyperglycemia  - Hemoglobin A1C; Future    6. Encounter for screening mammogram for malignant neoplasm of breast  - HEAVEN DIGITAL SCREEN W OR WO CAD BILATERAL; Future    7. Attention deficit hyperactivity disorder (ADHD), predominantly inattentive type  Stable w/o flare  No refills given today    8. Needs flu shot  Given flu shot today    9. Acquired cyst of kidney  asymptomatic    10. Diverticulosis  No s/s of diverticulitis  UTD on colonoscopy            Follow-up appointment:   Pending bloodwork results/prn    Discussed use, benefit, and side effects of all prescribed medications. Barriers to medication compliance addressed. All patient questions answered. Pt voiced understanding. When applicable, patient's outside records were reviewed through [facility]. The patient has signed appropriate paperworks/consents.

## 2020-11-03 NOTE — PROGRESS NOTES
Vaccine Information Sheet, \"Influenza - Inactivated\"  given to Bandar Nichole, or parent/legal guardian of  Bandar Nichole and verbalized understanding. Patient responses:    Have you ever had a reaction to a flu vaccine? No  Do you have any current illness? No  Have you ever had Guillian [place] Syndrome? No  Do you have a serious allergy to any of the follow: Neomycin, Polymyxin, Thimerosal, eggs or egg products? No    Flu vaccine given per order. Please see immunization tab. Risks and benefits explained. Current VIS given.       Immunizations Administered     Name Date Dose Route    Influenza, Quadv, IM, PF (6 mo and older Fluzone, Flulaval, Fluarix, and 3 yrs and older Afluria) 11/3/2020 0.5 mL Intramuscular    Site: Deltoid- Left    Lot: Laura Humphreys    NDC: 94119-941-17

## 2020-11-13 DIAGNOSIS — Z00.00 ROUTINE GENERAL MEDICAL EXAMINATION AT A HEALTH CARE FACILITY: ICD-10-CM

## 2020-11-13 DIAGNOSIS — R73.9 HYPERGLYCEMIA: ICD-10-CM

## 2020-11-13 LAB
ANION GAP SERPL CALCULATED.3IONS-SCNC: 10 MMOL/L (ref 3–16)
BUN BLDV-MCNC: 18 MG/DL (ref 7–20)
CALCIUM SERPL-MCNC: 9.4 MG/DL (ref 8.3–10.6)
CHLORIDE BLD-SCNC: 100 MMOL/L (ref 99–110)
CHOLESTEROL, TOTAL: 214 MG/DL (ref 0–199)
CO2: 26 MMOL/L (ref 21–32)
CREAT SERPL-MCNC: 0.6 MG/DL (ref 0.6–1.1)
GFR AFRICAN AMERICAN: >60
GFR NON-AFRICAN AMERICAN: >60
GLUCOSE BLD-MCNC: 100 MG/DL (ref 70–99)
HDLC SERPL-MCNC: 59 MG/DL (ref 40–60)
LDL CHOLESTEROL CALCULATED: 138 MG/DL
POTASSIUM SERPL-SCNC: 4.5 MMOL/L (ref 3.5–5.1)
SODIUM BLD-SCNC: 136 MMOL/L (ref 136–145)
TRIGL SERPL-MCNC: 85 MG/DL (ref 0–150)
VLDLC SERPL CALC-MCNC: 17 MG/DL

## 2020-11-14 LAB
ESTIMATED AVERAGE GLUCOSE: 125.5 MG/DL
HBA1C MFR BLD: 6 %

## 2020-11-23 ENCOUNTER — TELEPHONE (OUTPATIENT)
Dept: FAMILY MEDICINE CLINIC | Age: 55
End: 2020-11-23

## 2020-11-23 RX ORDER — CYCLOBENZAPRINE HCL 10 MG
10 TABLET ORAL 2 TIMES DAILY PRN
Qty: 30 TABLET | Refills: 0 | Status: SHIPPED | OUTPATIENT
Start: 2020-11-23 | End: 2020-12-08

## 2020-12-17 ENCOUNTER — TELEPHONE (OUTPATIENT)
Dept: FAMILY MEDICINE CLINIC | Age: 55
End: 2020-12-17

## 2020-12-17 NOTE — TELEPHONE ENCOUNTER
Patient calling today stating she has been having back spasms in her lower back for a couple of days. She states now it has been hurting all day and it is primarily left flank pain. Nausea after eating. She has not vomited today, but had been after eating for the past 2 days. She did state the nausea is mainly if she eats a large meal.     Patient states she has had black stools for a couple of days. No formed stool. She states she has been going 7-8 times a day. She has taken pepto-bismol. She states she thinks the stool was black prior to taking pepto-bismol.     No other body aches  No fever  No dizziness  No headache    Please advise    [phone]    Pharmacy is correct

## 2021-01-08 ENCOUNTER — TELEPHONE (OUTPATIENT)
Dept: FAMILY MEDICINE CLINIC | Age: 56
End: 2021-01-08

## 2021-01-08 NOTE — TELEPHONE ENCOUNTER
----- Message from [facility] INPATIENT sent at 1/7/2021 12:08 PM EST -----  Subject: Appointment Request    Reason for Call: Routine Existing Condition Follow Up    QUESTIONS  Type of Appointment? Established Patient  Reason for appointment request? Available appointments did not meet   patient need  Additional Information for Provider? Requesting a appointment for   01.12.2021 for a med check . This is patients next off day    offered a virtual   denied and request I put a message back. Please follow up to advise   ---------------------------------------------------------------------------  --------------  CALL BACK INFO  What is the best way for the office to contact you? OK to leave message on   voicemail  Preferred Call Back Phone Number? [phone]  ---------------------------------------------------------------------------  --------------  SCRIPT ANSWERS  Relationship to Patient? Self  Appointment reason? Well Care/Follow Ups  Select a Well Care/Follow Ups appointment reason? Adult Existing Condition   Follow Up [Diabetes   CHF   COPD   Hypertension/Blood Pressure Check]  (Is the patient requesting to be seen urgently for their symptoms?)? No  Is this follow up request related to routine Diabetes Management? No  Are you having any new concerns about your existing condition? No  Have you been diagnosed with   tested for   or told that you are suspected of having COVID-19 (Coronavirus)? No  Have you had a fever or taken medication to treat a fever within the past   3 days? No  Have you had a cough   shortness of breath or flu-like symptoms within the past 3 days? No  Do you currently have flu-like symptoms including fever or chills   cough   shortness of breath   or difficulty breathing   or new loss of taste or smell? No  (Service Expert  click yes below to proceed with [employer] As Usual   Scheduling)?  Yes

## 2021-01-26 ENCOUNTER — OFFICE VISIT (OUTPATIENT)
Dept: FAMILY MEDICINE CLINIC | Age: 56
End: 2021-01-26
Payer: COMMERCIAL

## 2021-01-26 VITALS
TEMPERATURE: 97.5 F | WEIGHT: 176.4 LBS | RESPIRATION RATE: 12 BRPM | OXYGEN SATURATION: 97 % | HEART RATE: 90 BPM | SYSTOLIC BLOOD PRESSURE: 124 MMHG | BODY MASS INDEX: 33.33 KG/M2 | DIASTOLIC BLOOD PRESSURE: 78 MMHG

## 2021-01-26 DIAGNOSIS — R10.84 GENERALIZED ABDOMINAL PAIN: ICD-10-CM

## 2021-01-26 DIAGNOSIS — F90.0 ATTENTION DEFICIT HYPERACTIVITY DISORDER (ADHD), PREDOMINANTLY INATTENTIVE TYPE: ICD-10-CM

## 2021-01-26 DIAGNOSIS — K57.90 DIVERTICULOSIS: ICD-10-CM

## 2021-01-26 DIAGNOSIS — K86.2 CYST OF PANCREAS: ICD-10-CM

## 2021-01-26 DIAGNOSIS — Z12.31 ENCOUNTER FOR SCREENING MAMMOGRAM FOR MALIGNANT NEOPLASM OF BREAST: ICD-10-CM

## 2021-01-26 DIAGNOSIS — M50.30 DDD (DEGENERATIVE DISC DISEASE), CERVICAL: ICD-10-CM

## 2021-01-26 DIAGNOSIS — R73.9 HYPERGLYCEMIA: ICD-10-CM

## 2021-01-26 DIAGNOSIS — L20.84 INTRINSIC ECZEMA: ICD-10-CM

## 2021-01-26 DIAGNOSIS — F33.0 MAJOR DEPRESSIVE DISORDER, RECURRENT EPISODE, MILD (HCC): Primary | ICD-10-CM

## 2021-01-26 PROCEDURE — 3017F COLORECTAL CA SCREEN DOC REV: CPT | Performed by: FAMILY MEDICINE

## 2021-01-26 PROCEDURE — G8417 CALC BMI ABV UP PARAM F/U: HCPCS | Performed by: FAMILY MEDICINE

## 2021-01-26 PROCEDURE — 99214 OFFICE O/P EST MOD 30 MIN: CPT | Performed by: FAMILY MEDICINE

## 2021-01-26 PROCEDURE — 1036F TOBACCO NON-USER: CPT | Performed by: FAMILY MEDICINE

## 2021-01-26 PROCEDURE — G8482 FLU IMMUNIZE ORDER/ADMIN: HCPCS | Performed by: FAMILY MEDICINE

## 2021-01-26 PROCEDURE — G8427 DOCREV CUR MEDS BY ELIG CLIN: HCPCS | Performed by: FAMILY MEDICINE

## 2021-01-26 RX ORDER — AMOXICILLIN AND CLAVULANATE POTASSIUM 875; 125 MG/1; MG/1
1 TABLET, FILM COATED ORAL 2 TIMES DAILY
Qty: 20 TABLET | Refills: 0 | Status: SHIPPED | OUTPATIENT
Start: 2021-01-26 | End: 2021-02-05

## 2021-01-26 RX ORDER — DEXTROAMPHETAMINE SACCHARATE, AMPHETAMINE ASPARTATE MONOHYDRATE, DEXTROAMPHETAMINE SULFATE AND AMPHETAMINE SULFATE 7.5; 7.5; 7.5; 7.5 MG/1; MG/1; MG/1; MG/1
60 CAPSULE, EXTENDED RELEASE ORAL EVERY MORNING
Qty: 60 CAPSULE | Refills: 0 | Status: CANCELLED | OUTPATIENT
Start: 2021-02-25 | End: 2021-03-27

## 2021-01-26 RX ORDER — CRISABOROLE 20 MG/G
OINTMENT TOPICAL
Qty: 100 G | Refills: 5 | Status: ON HOLD | OUTPATIENT
Start: 2021-01-26 | End: 2021-05-28 | Stop reason: HOSPADM

## 2021-01-26 RX ORDER — DEXTROAMPHETAMINE SACCHARATE, AMPHETAMINE ASPARTATE MONOHYDRATE, DEXTROAMPHETAMINE SULFATE AND AMPHETAMINE SULFATE 7.5; 7.5; 7.5; 7.5 MG/1; MG/1; MG/1; MG/1
60 CAPSULE, EXTENDED RELEASE ORAL EVERY MORNING
Qty: 60 CAPSULE | Refills: 0 | Status: CANCELLED | OUTPATIENT
Start: 2021-01-26 | End: 2021-02-25

## 2021-01-26 RX ORDER — DEXTROAMPHETAMINE SACCHARATE, AMPHETAMINE ASPARTATE MONOHYDRATE, DEXTROAMPHETAMINE SULFATE AND AMPHETAMINE SULFATE 7.5; 7.5; 7.5; 7.5 MG/1; MG/1; MG/1; MG/1
60 CAPSULE, EXTENDED RELEASE ORAL EVERY MORNING
Qty: 60 CAPSULE | Refills: 0 | Status: CANCELLED | OUTPATIENT
Start: 2021-03-27 | End: 2021-04-26

## 2021-01-26 NOTE — PROGRESS NOTES
Here for f/u and recheck of mood, ADD, abd pain issues    Pt states that she has been trying to do better with taking her meds better. 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 Pt has not gotten vaccine yet, she is concerned about the risk/side effects. Pt does have some mild to moderate eczema on hands, uses over the counter topical therapy that can help but with moderate irritation. Washes hands frequently at work. Here for follow up of ADD. Pt states that they are doing very well with current therapy and feels that control of symptoms are adequate at this time. No breakthru symptoms and no need/indication for adjustment in therapy. Taking meds as prescribed and denies any illicit medication usage of misuse of their stimulant thearpy. Mood is stable and denies any issues of depression or anxiety associated with treatment of ADD. Pt has not been on for several months and does feel breakthru sx when she is not on therapy. Pt states that when she is on the medication, it can cause a change in personality. Pt feels moodi is doing ok but with some breakthru sx of anxiety. Would like to avoid additional meds at this time, and would like to see how stimulant therapy does for her    Pt does have hx of diverticular dz and feels no consistent abd pain issues. Does have nausea but no emesis. Loose bowels sx flared up 2 weeks ago, is concerned for recurrence of diverticultiis. Except as noted above in the history of present illness, the review of systems is  negative for headache, vision changes, chest pain, shortness of breath, abdominal pain, urinary sx, bowel changes.     Past medical, surgical, and social history reviewed and updated  Medications and allergies reviewed and updated O: /78   Pulse 90   Temp 97.5 °F (36.4 °C) (Temporal)   Resp 12   Wt 176 lb 6.4 oz (80 kg)   SpO2 97%   BMI 33.33 kg/m²   GEN: No acute distress, cooperative, well nourished, alert. HEENT: PEERLA, EOMI , normocephalic/atraumatic, nares and oropharynx clear. Mucous membranes normal, Tympanic membranes clear bilaterally. Neck: soft, supple, no thyromegaly, mass, no Lymphadenopathy  CV: Regular rate and rhythm, no murmur, rubs, gallops. No edema. Resp: Clear to auscultation bilaterally good air entry bilaterally  No crackles, wheeze. Breathing comfortably. Abd: soft, mild suprapubic tender to palpation. normoactive bowels sounds. No hepatosplenomegaly  No costovertebral angle tenderness  No guard/rebound. Current Outpatient Medications   Medication Sig Dispense Refill    Crisaborole (EUCRISA) 2 % OINT Apply to affected area QD as directed 100 g 5    lisdexamfetamine (VYVANSE) 50 MG capsule Take 1 capsule by mouth every morning for 30 days. 30 capsule 0    amoxicillin-clavulanate (AUGMENTIN) 875-125 MG per tablet Take 1 tablet by mouth 2 times daily for 10 days 20 tablet 0    albuterol sulfate HFA (PROVENTIL HFA) 108 (90 Base) MCG/ACT inhaler Inhale 2 puffs into the lungs every 6 hours as needed for Wheezing 1 Inhaler 3    omeprazole (PRILOSEC) 40 MG delayed release capsule Take 1 capsule by mouth daily 30 capsule 5    hydrOXYzine (ATARAX) 25 MG tablet Take 1 tablet by mouth every 8 hours as needed for Anxiety (Patient not taking: Reported on 1/26/2021) 30 tablet 5    DULoxetine (CYMBALTA) 60 MG extended release capsule Take 1 capsule by mouth daily (Patient not taking: Reported on 1/26/2021) 30 capsule 5     No current facility-administered medications for this visit. ASSESSMENT / PLAN:    1. Attention deficit hyperactivity disorder (ADHD), predominantly inattentive type  Was doing ok with adderall but felt was changing her personality a bit  Discussed tx options. Trial vyvanse 50mg qd  Discussed use, benefit, and side effects of prescribed medications. Barriers to medication compliance addressed. All patient questions answered. Pt voiced understanding. F/u 1 month  - lisdexamfetamine (VYVANSE) 50 MG capsule; Take 1 capsule by mouth every morning for 30 days. Dispense: 30 capsule; Refill: 0    2. Major depressive disorder, recurrent episode, mild (HCC)  Mood seems stable  Cont with supportive therapy and monitor  F/u 1 month after start of vyvanse and will see how sx are doing    3. Cyst of pancreas  asymptomatic    4. Intrinsic eczema  Persistent sx despite over the counter therapy, HC 1% cream  Trial eucrisa as directed  Discussed use, benefit, and side effects of prescribed medications. Barriers to medication compliance addressed. All patient questions answered. Pt voiced understanding. 5. Hyperglycemia  a1c midly increase at 6.0  Cont lifestyle mgt, diet/exercise and will monitor    6. Encounter for screening mammogram for malignant neoplasm of breast  Due mammo, aware and agreeable to set up    7. DDD (degenerative disc disease), cervical  Reviewed xray  Refer to physical therapy   - External Referral To Physical Therapy    8. Generalized abdominal pain  C/w mild acute diverticulitis  tx with augmentin 875mg BID x 10d  Consider imaging for persistent or increased sx    9. Diverticulosis  As above, tx acute flare of diverticulitis           Follow-up appointment:   1 month    Discussed use, benefit, and side effects of all prescribed medications. Barriers to medication compliance addressed. All patient questions answered. Pt voiced understanding. When applicable, patient's outside records were reviewed through [facility]. The patient has signed appropriate paperworks/consents.

## 2021-01-27 ENCOUNTER — TELEPHONE (OUTPATIENT)
Dept: FAMILY MEDICINE CLINIC | Age: 56
End: 2021-01-27

## 2021-01-27 NOTE — TELEPHONE ENCOUNTER
Office has been notified that pt is requiring Prior Authorization for the following medication:  Eucrisa 2% Ointment 100gm     Please initiate this request through CoverMyMeds, contacting the following Payor/Insurance:  [facility]     Please see below, or the documentation attached to this encounter for any additional information that may assist in processing PA:  --     Thank you!

## 2021-01-28 NOTE — TELEPHONE ENCOUNTER
PA submitted via [facility] for Eucrisa 2% ointment.   (Goldie Perez)  [phone]    STATUS: PENDING

## 2021-02-09 ENCOUNTER — TELEPHONE (OUTPATIENT)
Dept: FAMILY MEDICINE CLINIC | Age: 56
End: 2021-02-09

## 2021-02-09 DIAGNOSIS — F90.0 ATTENTION DEFICIT HYPERACTIVITY DISORDER (ADHD), PREDOMINANTLY INATTENTIVE TYPE: Primary | ICD-10-CM

## 2021-02-09 RX ORDER — METHYLPHENIDATE HYDROCHLORIDE 36 MG/1
36 TABLET ORAL DAILY
Qty: 30 TABLET | Refills: 0 | Status: ON HOLD | OUTPATIENT
Start: 2021-02-09 | End: 2021-05-28 | Stop reason: HOSPADM

## 2021-02-09 NOTE — TELEPHONE ENCOUNTER
Pt is wanting to know if she can be prescribed Concerta instead of Vyvanse due to cost and Vyvanse doesn't have a generic. Vyvanse for 30 tabs=$110 dollars. Pt did not get the Vyvanse filled. Pt states she doesn't know if concerta is like adderall and will change her mood and personality.     Please advise  Thank you

## 2021-03-10 ENCOUNTER — HOSPITAL ENCOUNTER (OUTPATIENT)
Dept: GENERAL RADIOLOGY | Age: 56
Discharge: HOME | End: 2021-03-10
Payer: COMMERCIAL

## 2021-03-10 ENCOUNTER — HOSPITAL ENCOUNTER (OUTPATIENT)
Age: 56
Discharge: HOME | End: 2021-03-10
Payer: COMMERCIAL

## 2021-03-10 ENCOUNTER — OFFICE VISIT (OUTPATIENT)
Dept: FAMILY MEDICINE CLINIC | Age: 56
End: 2021-03-10
Payer: COMMERCIAL

## 2021-03-10 VITALS
RESPIRATION RATE: 14 BRPM | HEART RATE: 89 BPM | DIASTOLIC BLOOD PRESSURE: 76 MMHG | TEMPERATURE: 97.5 F | SYSTOLIC BLOOD PRESSURE: 122 MMHG | OXYGEN SATURATION: 96 % | WEIGHT: 177 LBS | BODY MASS INDEX: 33.44 KG/M2

## 2021-03-10 DIAGNOSIS — M47.816 SPONDYLOSIS OF LUMBAR REGION WITHOUT MYELOPATHY OR RADICULOPATHY: ICD-10-CM

## 2021-03-10 DIAGNOSIS — M25.551 RIGHT HIP PAIN: ICD-10-CM

## 2021-03-10 DIAGNOSIS — F90.0 ATTENTION DEFICIT HYPERACTIVITY DISORDER (ADHD), PREDOMINANTLY INATTENTIVE TYPE: Primary | ICD-10-CM

## 2021-03-10 DIAGNOSIS — L20.84 INTRINSIC ECZEMA: ICD-10-CM

## 2021-03-10 DIAGNOSIS — F41.8 DEPRESSION WITH ANXIETY: ICD-10-CM

## 2021-03-10 DIAGNOSIS — M43.16 SPONDYLOLISTHESIS OF LUMBAR REGION: ICD-10-CM

## 2021-03-10 DIAGNOSIS — M51.36 DDD (DEGENERATIVE DISC DISEASE), LUMBAR: ICD-10-CM

## 2021-03-10 DIAGNOSIS — R73.9 HYPERGLYCEMIA: ICD-10-CM

## 2021-03-10 DIAGNOSIS — Z12.31 ENCOUNTER FOR SCREENING MAMMOGRAM FOR MALIGNANT NEOPLASM OF BREAST: ICD-10-CM

## 2021-03-10 PROCEDURE — 1036F TOBACCO NON-USER: CPT | Performed by: FAMILY MEDICINE

## 2021-03-10 PROCEDURE — G8482 FLU IMMUNIZE ORDER/ADMIN: HCPCS | Performed by: FAMILY MEDICINE

## 2021-03-10 PROCEDURE — 3017F COLORECTAL CA SCREEN DOC REV: CPT | Performed by: FAMILY MEDICINE

## 2021-03-10 PROCEDURE — G8427 DOCREV CUR MEDS BY ELIG CLIN: HCPCS | Performed by: FAMILY MEDICINE

## 2021-03-10 PROCEDURE — 73502 X-RAY EXAM HIP UNI 2-3 VIEWS: CPT

## 2021-03-10 PROCEDURE — 99214 OFFICE O/P EST MOD 30 MIN: CPT | Performed by: FAMILY MEDICINE

## 2021-03-10 PROCEDURE — G8417 CALC BMI ABV UP PARAM F/U: HCPCS | Performed by: FAMILY MEDICINE

## 2021-03-10 RX ORDER — DESVENLAFAXINE 50 MG/1
50 TABLET, EXTENDED RELEASE ORAL DAILY
Qty: 30 TABLET | Refills: 3 | Status: SHIPPED | OUTPATIENT
Start: 2021-03-10 | End: 2021-07-21 | Stop reason: SDUPTHER

## 2021-03-10 RX ORDER — HYDROCODONE BITARTRATE AND ACETAMINOPHEN 5; 325 MG/1; MG/1
1 TABLET ORAL EVERY 8 HOURS PRN
Qty: 30 TABLET | Refills: 0 | Status: SHIPPED | OUTPATIENT
Start: 2021-03-10 | End: 2021-05-13 | Stop reason: SDUPTHER

## 2021-03-10 RX ORDER — METHYLPHENIDATE HYDROCHLORIDE 36 MG/1
36 TABLET ORAL EVERY MORNING
Qty: 30 TABLET | Refills: 0 | Status: ON HOLD | OUTPATIENT
Start: 2021-04-09 | End: 2021-05-28 | Stop reason: HOSPADM

## 2021-03-10 RX ORDER — METHYLPHENIDATE HYDROCHLORIDE 36 MG/1
36 TABLET ORAL EVERY MORNING
Qty: 30 TABLET | Refills: 0 | Status: ON HOLD | OUTPATIENT
Start: 2021-03-10 | End: 2021-05-28 | Stop reason: HOSPADM

## 2021-03-10 RX ORDER — METHYLPHENIDATE HYDROCHLORIDE 36 MG/1
36 TABLET ORAL EVERY MORNING
Qty: 30 TABLET | Refills: 0 | Status: SHIPPED | OUTPATIENT
Start: 2021-05-09 | End: 2021-05-13 | Stop reason: SDUPTHER

## 2021-03-10 NOTE — PROGRESS NOTES
Here for f/u and recheck of mood, ADD issues, R hip/groin pain    Pt with discomfort in R groin. Has been present for a few months. No injury or trauma. Pt can ambulate ok but at times after a full day of walking will be worse. Pt does not remember any trauma. Struggles to put shoes on as she has to lift her leg. Can walk ok and strength seems ok. Biggest sx is pain. Pt did have xray of lumbar spine and MRI of lumbar spine that did not show any progressive chnages from prior imaging. They did recommend consideration of epidural steroid injection but has not set up. No abd pain, no nausea/vomiting. No bowel changes or urinary sx. Pt does have some mild increase in blood sugars. Pts a1c was at 6.0, unchanged from prior. Pt is working on trying to improve her lifestyle, and watching sugar intake    Here for follow up of ADD. Pt states that they are doing very well with current therapy and feels that control of symptoms are adequate at this time. No breakthru symptoms and no need/indication for adjustment in therapy. Taking meds as prescribed and denies any illicit medication usage of misuse of their stimulant thearpy. Mood is stable and denies any issues of depression or anxiety associated with treatment of ADD. Pt feels the change in meds to concerta does seem to do well. Does have some mild increase in anxiety. Pt does not see any trigger, no increase in stressors. Pt wonders if it has something to do with the chronic issues related to COVID. When sx show up, she works through [employer] by relaxation, breathing through sx. Partner does feel that she has a lot of leg movements in her sleep but she is not aware of it. Does not wake herself up with sx. Except as noted above in the history of present illness, the review of systems is  negative for headache, vision changes, chest pain, shortness of breath, abdominal pain, urinary sx, bowel changes.     Past medical, surgical, and social history reviewed and updated  Medications and allergies reviewed and updated         O: /76   Pulse 89   Temp 97.5 °F (36.4 °C) (Temporal)   Resp 14   Wt 177 lb (80.3 kg)   SpO2 96%   BMI 33.44 kg/m²   GEN: No acute distress, cooperative, well nourished, alert. HEENT: PEERLA, EOMI , normocephalic/atraumatic, nares and oropharynx clear. Mucous membranes normal, Tympanic membranes clear bilaterally. Neck: soft, supple, no thyromegaly, mass, no Lymphadenopathy  CV: Regular rate and rhythm, no murmur, rubs, gallops. No edema. Resp: Clear to auscultation bilaterally good air entry bilaterally  No crackles, wheeze. Breathing comfortably. Ext: no clubbing, cyanosis, edema  Musc: full range of motion bilateral lower extremities. Neg straight leg-raise bilaterally, moderate pain with range of motion R hip with internal rotation. Strength 5/5        Current Outpatient Medications   Medication Sig Dispense Refill    methylphenidate (CONCERTA) 36 MG extended release tablet Take 1 tablet by mouth every morning for 30 days. 30 tablet 0    [START ON 5/9/2021] methylphenidate (CONCERTA) 36 MG extended release tablet Take 1 tablet by mouth every morning for 30 days. 30 tablet 0    [START ON 4/9/2021] methylphenidate (CONCERTA) 36 MG extended release tablet Take 1 tablet by mouth every morning for 30 days. 30 tablet 0    desvenlafaxine succinate (PRISTIQ) 50 MG TB24 extended release tablet Take 1 tablet by mouth daily 30 tablet 3    HYDROcodone-acetaminophen (NORCO) 5-325 MG per tablet Take 1 tablet by mouth every 8 hours as needed for Pain for up to 10 days. 30 tablet 0    methylphenidate (CONCERTA) 36 MG extended release tablet Take 1 tablet by mouth daily for 30 days.  30 tablet 0    albuterol sulfate HFA (PROVENTIL HFA) 108 (90 Base) MCG/ACT inhaler Inhale 2 puffs into the lungs every 6 hours as needed for Wheezing 1 Inhaler 3    omeprazole (PRILOSEC) 40 MG delayed release capsule Take 1 capsule by mouth daily 30 capsule 5    Crisaborole (EUCRISA) 2 % OINT Apply to affected area QD as directed (Patient not taking: Reported on 3/10/2021) 100 g 5    hydrOXYzine (ATARAX) 25 MG tablet Take 1 tablet by mouth every 8 hours as needed for Anxiety (Patient not taking: Reported on 3/10/2021) 30 tablet 5     No current facility-administered medications for this visit. ASSESSMENT / PLAN:    1. Attention deficit hyperactivity disorder (ADHD), predominantly inattentive type  Stable w/ current therapy, does like the concerta very well  refills given for 3 months  - methylphenidate (CONCERTA) 36 MG extended release tablet; Take 1 tablet by mouth every morning for 30 days. Dispense: 30 tablet; Refill: 0  - methylphenidate (CONCERTA) 36 MG extended release tablet; Take 1 tablet by mouth every morning for 30 days. Dispense: 30 tablet; Refill: 0  - methylphenidate (CONCERTA) 36 MG extended release tablet; Take 1 tablet by mouth every morning for 30 days. Dispense: 30 tablet; Refill: 0    2. Right hip pain  Check xray R hip  Does have hx of degenerative disc disease lumbar spine, not sure if contributing  - XR HIP 2-3 VW W PELVIS RIGHT; Future    3. Encounter for screening mammogram for malignant neoplasm of breast  Has appt next week for mammo    4. Hyperglycemia  Stable with high a1c at 6.0  Cont lifestyle mgt, diet/exercise    5. Intrinsic eczema  Insurance would not cover eucrisa  Cont topical/supportive therapy    6. Depression with anxiety  Restart trial pristiq 50mg qd  Discussed use, benefit, and side effects of prescribed medications. Barriers to medication compliance addressed. All patient questions answered. Pt voiced understanding. 7. DDD (degenerative disc disease), lumbar  Reviewed xray and MRI  They did discuss epidural steroid injection, pt considering  Await xray R hip    8.  Spondylolisthesis of lumbar region  As above  Short course norco as below pending xray of hip and f/u with ortho  - HYDROcodone-acetaminophen (NORCO) 5-325 MG per tablet; Take 1 tablet by mouth every 8 hours as needed for Pain for up to 10 days. Dispense: 30 tablet; Refill: 0    9. Spondylosis of lumbar region without myelopathy or radiculopathy  As above  See CSM  - HYDROcodone-acetaminophen (NORCO) 5-325 MG per tablet; Take 1 tablet by mouth every 8 hours as needed for Pain for up to 10 days. Dispense: 30 tablet; Refill: 0           Follow-up appointment:   3 months/prn    Discussed use, benefit, and side effects of all prescribed medications. Barriers to medication compliance addressed. All patient questions answered. Pt voiced understanding. When applicable, patient's outside records were reviewed through [address] [place] [address]. The patient has signed appropriate paperworks/consents.

## 2021-03-11 DIAGNOSIS — G89.29 CHRONIC RIGHT HIP PAIN: Primary | ICD-10-CM

## 2021-03-11 DIAGNOSIS — M25.551 CHRONIC RIGHT HIP PAIN: Primary | ICD-10-CM

## 2021-03-11 DIAGNOSIS — M24.051 LOOSE BODY IN RIGHT HIP: ICD-10-CM

## 2021-03-16 ENCOUNTER — TELEPHONE (OUTPATIENT)
Dept: FAMILY MEDICINE CLINIC | Age: 56
End: 2021-03-16

## 2021-03-16 NOTE — TELEPHONE ENCOUNTER
Patient calling today stating she would like the results of her x-ray for the hip and her cervical spine. She was given the message and will be calling to schedule an MRI for her hip. When she finds out where she would like to go to PT she will call, if not [address] so we will know where to fax.

## 2021-04-01 ENCOUNTER — HOSPITAL ENCOUNTER (OUTPATIENT)
Dept: MRI IMAGING | Age: 56
Discharge: HOME | End: 2021-04-01
Payer: COMMERCIAL

## 2021-04-01 DIAGNOSIS — M25.551 CHRONIC RIGHT HIP PAIN: ICD-10-CM

## 2021-04-01 DIAGNOSIS — M24.051 LOOSE BODY IN RIGHT HIP: ICD-10-CM

## 2021-04-01 DIAGNOSIS — G89.29 CHRONIC RIGHT HIP PAIN: ICD-10-CM

## 2021-04-01 PROCEDURE — 73721 MRI JNT OF LWR EXTRE W/O DYE: CPT

## 2021-04-02 DIAGNOSIS — M25.551 CHRONIC RIGHT HIP PAIN: Primary | ICD-10-CM

## 2021-04-02 DIAGNOSIS — M24.051 LOOSE BODY IN RIGHT HIP: ICD-10-CM

## 2021-04-02 DIAGNOSIS — G89.29 CHRONIC RIGHT HIP PAIN: Primary | ICD-10-CM

## 2021-04-06 ENCOUNTER — HOSPITAL ENCOUNTER (OUTPATIENT)
Dept: MAMMOGRAPHY | Age: 56
Discharge: HOME | End: 2021-04-06
Payer: COMMERCIAL

## 2021-04-06 DIAGNOSIS — Z12.31 ENCOUNTER FOR SCREENING MAMMOGRAM FOR MALIGNANT NEOPLASM OF BREAST: ICD-10-CM

## 2021-04-06 PROCEDURE — 77067 SCR MAMMO BI INCL CAD: CPT

## 2021-04-07 ENCOUNTER — OFFICE VISIT (OUTPATIENT)
Dept: ORTHOPEDIC SURGERY | Age: 56
End: 2021-04-07
Payer: COMMERCIAL

## 2021-04-07 VITALS — RESPIRATION RATE: 12 BRPM | BODY MASS INDEX: 33.42 KG/M2 | HEIGHT: 61 IN | WEIGHT: 177 LBS

## 2021-04-07 DIAGNOSIS — M25.751 FEMOROACETABULAR IMPINGEMENT WITH OSTEOPHYTE OF RIGHT HIP: Primary | ICD-10-CM

## 2021-04-07 DIAGNOSIS — M25.851 FEMOROACETABULAR IMPINGEMENT WITH OSTEOPHYTE OF RIGHT HIP: Primary | ICD-10-CM

## 2021-04-07 PROCEDURE — G8417 CALC BMI ABV UP PARAM F/U: HCPCS | Performed by: ORTHOPAEDIC SURGERY

## 2021-04-07 PROCEDURE — 20611 DRAIN/INJ JOINT/BURSA W/US: CPT | Performed by: ORTHOPAEDIC SURGERY

## 2021-04-07 PROCEDURE — G8427 DOCREV CUR MEDS BY ELIG CLIN: HCPCS | Performed by: ORTHOPAEDIC SURGERY

## 2021-04-07 PROCEDURE — 1036F TOBACCO NON-USER: CPT | Performed by: ORTHOPAEDIC SURGERY

## 2021-04-07 PROCEDURE — 3017F COLORECTAL CA SCREEN DOC REV: CPT | Performed by: ORTHOPAEDIC SURGERY

## 2021-04-07 PROCEDURE — 99204 OFFICE O/P NEW MOD 45 MIN: CPT | Performed by: ORTHOPAEDIC SURGERY

## 2021-04-07 RX ORDER — HYDROCODONE BITARTRATE AND ACETAMINOPHEN 5; 325 MG/1; MG/1
1 TABLET ORAL EVERY 6 HOURS PRN
Qty: 4 TABLET | Refills: 0 | Status: SHIPPED | OUTPATIENT
Start: 2021-04-07 | End: 2021-04-11

## 2021-04-07 NOTE — LETTER
[address]  [address] [facility] [address]  Phone: [phone]  Fax: [phone]    Bharti Benitez MD        April 7, 2021     Patient: Marsha Camacho   YOB: 1965   Date of Visit: 4/7/2021       To Whom It May Concern: It is my medical opinion that Andrews Rosa may return to work on FULL with the following restrictions: avoid excessive walking or standing and sitting. Light lifting. For 1 week. If you have any questions or concerns, please don't hesitate to call.     Sincerely,        Bharti Benitez MD

## 2021-04-07 NOTE — PROGRESS NOTES
4/7/21  3:49 PM        NDC: 42433-204-55   -   Ropivacaine 0.5 %    LOT: [id number]    COMMENT: RIGHT HIP FREEZING INJECTION        4/7/21  3:50 PM    NDC: 0403-4420-01   -   Ropivacaine 0.5 %    LOT: [id number]    COMMENT: RIGHT HIP FREEZING INJECTION

## 2021-04-07 NOTE — PROGRESS NOTES
Date:  2021    Name:  Mame Allan  Address:  [address] [place] [address] [address]    :  1965      Age:   63 y.o.    SSN:  [id number]      Medical Record Number:      Reason for Visit:    Chief Complaint    Hip Pain (NP R hip/groin pain. Patient reports groin pain more than hip pain. Pain started 3 months ago. )      DOS:2021     HPI: Ivis Sun is a 63 y.o. female here today for persistent anterior hip and groin pain for the past 3 months. 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 The discomfort is anteriorly and deep and it pinches with flexion abduction rotation. She has no relief with rest mildly with Norco and other over-the-counter anti-inflammatories. She denies any history of prior hip pain or disorders. No history of childhood hip disease. She denies any buttock pain or any numbness or tingling going down the leg. This is not improving. She was referred to me by Dr. Jose Angel Razo. Pain Assessment  Location of Pain: Other (Comment)(groin/hip)  Severity of Pain: 6  Quality of Pain: Sharp  Duration of Pain: Persistent  Frequency of Pain: Intermittent  Date Pain First Started: 21  Aggravating Factors: Standing, Walking, Bending  Limiting Behavior: Yes  Relieving Factors: Rest, Nsaids  Result of Injury: No  Work-Related Injury: No  Are there other pain locations you wish to document?: No  ROS: Review of systems reviewed from Patient History Form completed today and available in the patient's chart under the Media tab.        Past Medical History:   Diagnosis Date    Acid reflux     Acquired cyst of kidney     ADHD (attention deficit hyperactivity disorder) 10/1/2013    DDD (degenerative disc disease), cervical 2021    DDD (degenerative disc disease), lumbar 2013    Depression with anxiety 3/10/2021    Depressive disorder, not elsewhere classified 2010    Hiatal hernia 3/7/2016    History of loop recorder placed 2 years ago    Irritable bowel syndrome 2010    Migraine, unspecified, without mention of intractable migraine without mention of status migrainosus 2010    Palpitations 2010    Pancreatitis     Pneumonia childhood    Reflux esophagitis 2010        Past Surgical History:   Procedure Laterality Date    ABLATION OF DYSRHYTHMIC FOCUS      APPENDECTOMY       SECTION      COLONOSCOPY  2018    COLONOSCOPY WITH BIOPSY performed by Mike Trent MD at [address] [place] [address]      laparoscopic    OVARY REMOVAL      bilateral    MT COLONOSCOPY FLX DX W/COLLJ SPEC WHEN PFRMD N/A 2018    COLONOSCOPY WITH MAC performed by Mike Trent MD at [facility] ENDOSCOPY       No family history on file. Social History     Socioeconomic History    Marital status:      Spouse name: None    Number of children: None    Years of education: None    Highest education level: None   Occupational History    None   Social Needs    Financial resource strain: None    Food insecurity     Worry: None     Inability: None    Transportation needs     Medical: None     Non-medical: None   Tobacco Use    Smoking status: Never Smoker    Smokeless tobacco: Never Used   Substance and Sexual Activity    Alcohol use:  Yes     Alcohol/week: 1.0 standard drinks     Types: 1 Glasses of wine per week     Comment: once a week    Drug use: No    Sexual activity: None   Lifestyle    Physical activity     Days per week: None     Minutes per session: None    Stress: None   Relationships    Social connections     Talks on phone: None     Gets together: None     Attends Anabaptism service: None     Active member of club or organization: None     Attends meetings of clubs or organizations: None     Relationship status: None    Intimate partner violence     Fear of current or ex partner: None     Emotionally abused: None     Physically abused: None     Forced sexual activity: None   Other Topics Concern    None   Social History Narrative    None       Current Outpatient Medications   Medication Sig Dispense Refill    HYDROcodone-acetaminophen (NORCO) 5-325 MG per tablet Take 1 tablet by mouth every 6 hours as needed for Pain for up to 4 days. Intended supply: 3 days. Take lowest dose possible to manage pain 4 tablet 0    [START ON 4/9/2021] methylphenidate (CONCERTA) 36 MG extended release tablet Take 1 tablet by mouth every morning for 30 days. 30 tablet 0    hydrOXYzine (ATARAX) 25 MG tablet Take 1 tablet by mouth every 8 hours as needed for Anxiety 30 tablet 5    albuterol sulfate HFA (PROVENTIL HFA) 108 (90 Base) MCG/ACT inhaler Inhale 2 puffs into the lungs every 6 hours as needed for Wheezing 1 Inhaler 3    omeprazole (PRILOSEC) 40 MG delayed release capsule Take 1 capsule by mouth daily 30 capsule 5    methylphenidate (CONCERTA) 36 MG extended release tablet Take 1 tablet by mouth every morning for 30 days. (Patient not taking: Reported on 4/7/2021) 30 tablet 0    [START ON 5/9/2021] methylphenidate (CONCERTA) 36 MG extended release tablet Take 1 tablet by mouth every morning for 30 days. (Patient not taking: Reported on 4/7/2021) 30 tablet 0    desvenlafaxine succinate (PRISTIQ) 50 MG TB24 extended release tablet Take 1 tablet by mouth daily (Patient not taking: Reported on 4/7/2021) 30 tablet 3    methylphenidate (CONCERTA) 36 MG extended release tablet Take 1 tablet by mouth daily for 30 days. 30 tablet 0    Crisaborole (EUCRISA) 2 % OINT Apply to affected area QD as directed (Patient not taking: Reported on 3/10/2021) 100 g 5     No current facility-administered medications for this visit. Allergies   Allergen Reactions    Fioricet [Butalbital-Apap-Caffeine] Other (See Comments)     Dizziness.        Vital signs:  Resp 12   Ht 5' 1\" (1.549 m)   Wt 177 lb (80.3 kg)   BMI 33.44 kg/m²        Constitutional: The physical examination finds the patient to be well-developed and well-nourished. The patient is alert and oriented x3 and was cooperative throughout the visit. Neuro: no focal deficits noted. Normal mood, judgement, decision making  Eyes: sclera clear, EOMI  Ears: Normal external ear  Mouth:  No perioral lesions  Pulm: Respirations unlabored and regular  Pulse: Extremities well perfused, warm, capillary refill < 2 seconds  Musculoskeletal:    Hip Examination: RIGHT    Skin/Inspection: no skin lesions, cellulitis, or extreme edema in the lower extremities. Standing/Walking: MILDLY antalgic gait, no pelvic tilt, Trendelenburg sign. No use of assistive devices    Sitting Exam: 5/5 Hip Flexor Strength, 5/5 Abductor Strength, 5/5 Adductor strength, Negative Straight Leg Raise    Supine Exam: Non tender around the ASIS, AIIS  Flexion arc 0 to 90deg, with PAIN  Special Tests: POSITIVE Deep Flexion Test, POSITIVE FADIR , MODERATE pain with CIARA, NEG resisted sit-up (Athletic Pubalgia). Side Lying Exam: Non-tender at greater trochanter, abductor musculature, nor TFL origin. Abductor side leg raise 5/5 strength. Negative OberTest    Prone Exam: Negative hip flexion contracture, internal rotation to 25 deg, external rotation to 45 deg. Non-tender at the ischial tuberosity nor proximal hamstring      Contralateral Hip Examination: left    Skin/Inspection: no skin lesions, cellulitis, or extreme edema in the lower extremities. Standing/Walking: Normal non-antalgic gait, no pelvic tilt, negative Trendelenburg sign. No use of assistive devices    Sitting Exam: 5/5 Hip Flexor Strength, 5/5 Abductor Strength, 5/5 Adductor strength, Negative Straight Leg Raise    Supine Exam: Non tender around the ASIS, AIIS  Flexion arc 0 to 100deg, with no pain or difficulty.   Special Tests: neg Deep Flexion Test,  FADIR , neg pain with CIARA, negative resisted sit-up (Athletic Pubalgia). Side Lying Exam: Non-tender at greater trochanter, abductor musculature, nor TFL origin. Abductor side leg raise 5/5 strength. Negative OberTest    Prone Exam: Negative hip flexion contracture, internal rotation to 25 deg, external rotation to 45 deg. Non-tender at the ischial tuberosity nor proximal hamstring      Diagnostics:  Radiology:       Pertinent imaging reviewed, images only - no report available. Radiographs were obtained and reviewed in the office; 3 views: AP Pelvis and right hip 45-deg Patterson View, and right False Profile View    Tonnis ndGndrndanddndend:nd nd2nd LCEA:    Alpha Angle:   Cross over-sign is focal  Other: Negative Coxa Profunda, Negative Protrusio    Impression: PINCER FORTINO, os acetabuli, no acute findings. 1.8 cm sclerotic focus overlying the inferomedial right hip joint.  This is nonspecific however may reflect intra-articular loose body.  Further evaluation MRI recommended to better evaluate this finding.           MRI Right Hip:   Degenerative arthropathic subchondral cysts at the right superolateral acetabulum       Right trochanteric bursitis       Insertional partial thickness tear at the right hamstring tendon at the level of the ischio tuberosity       Insertional left hamstring tendinosis.       Order History        Assessment: Patient is a 63 y.o. female with right anterior hip pain related to an MRI diagnosis of an os acetabuli, pincer FORTINO. 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 Intended supply: 3 days.  Take lowest dose possible to manage pain     Dispense:  4 tablet     Refill:  0     Reduce doses taken as pain becomes manageable     Orders Placed This Encounter   Procedures    US GUIDED NEEDLE PLACEMENT     Standing Status:   Future     Number of Occurrences:   1     Standing Expiration Date:   4/7/2022    W[place] ARTHROCENTESIS ASPIR&/INJ MAJOR JT/BURSA W/O US       Plan:  Pertinent imaging was reviewed. The etiology, natural history, and treatment options for the disorder were discussed. The roles of activity medication, antiinflammatories, injections, bracing, physical therapy, and surgical interventions were all described to the patient and questions were answered. We believe patient is a candidate for a diagnostic freezing into the hip joint today. Should this alleviate her symptoms temporarily we can be more confident of an intra-articular source of hip pain. The patient was given the option of performing today's injection using ultrasound guidance. We discussed the pros and cons of using the ultrasound for guidance. The patient chose to proceed, and today's injection was performed using Logic E ultrasound unit with a variable frequency (10 MHz) linear transducer for localization and needle placement. The image was saved for the medical record. Procedure: The indications and risks of steroid injection as well as treatment alternatives were discussed with the patient who consented to the procedure. Under sterile conditions and after informed consent was obtained the patient was given an injection into the RIGHT hip joint with ultrasound guidance. Using a 20 gauge needle,3 mL of 1% lidocaine and 3mL of 0.5% ropivacaine (Naropin) were placed in the right hip after it was prepped with chlorhexidine and needle localization was confirmed on ultrasound. This resulted in some relief of symptoms, and flexion/rotation tests of the involved hip shortly after the injection were somewhat less provocative. There were no complications.  The patient was advised to ice the hip  this evening and to avoid vigorous activities for the next 2 days. They were advised to call us if there was any erythema, enduration, swelling or increasing pain. Should this improve her symptoms, we can consider hip arthroscopy and os acetabuli stabilization vs resection, along with labral surgery at the adjacent segment. All the patient's questions were answered while in the clinic. The patient is understanding of all instructions and agrees with the plan. Approximately 45 minutes was spent on patient education and coordinating care. Follow up in: Return in about 1 week (around 4/14/2021). HOS, Ihot12, VAS Self assessment forms completed. Sincerely,    Ya Head MD [address] [facility]   [address] [place], [address]  Email: [email or web address]  Office: [phone]    04/07/21  4:24 PM    This dictation was performed with a verbal recognition program (DRAGON) and it was checked for errors. It is possible that there are still dictated errors within this office note. If so, please bring any errors to my attention for an addendum. All efforts were made to ensure that this office note is accurate.

## 2021-04-14 ENCOUNTER — OFFICE VISIT (OUTPATIENT)
Dept: ORTHOPEDIC SURGERY | Age: 56
End: 2021-04-14
Payer: COMMERCIAL

## 2021-04-14 VITALS — BODY MASS INDEX: 33.42 KG/M2 | HEIGHT: 61 IN | WEIGHT: 177 LBS

## 2021-04-14 DIAGNOSIS — M25.751 FEMOROACETABULAR IMPINGEMENT WITH OSTEOPHYTE OF RIGHT HIP: Primary | ICD-10-CM

## 2021-04-14 DIAGNOSIS — M25.851 FEMOROACETABULAR IMPINGEMENT WITH OSTEOPHYTE OF RIGHT HIP: Primary | ICD-10-CM

## 2021-04-14 PROCEDURE — G8427 DOCREV CUR MEDS BY ELIG CLIN: HCPCS | Performed by: ORTHOPAEDIC SURGERY

## 2021-04-14 PROCEDURE — 1036F TOBACCO NON-USER: CPT | Performed by: ORTHOPAEDIC SURGERY

## 2021-04-14 PROCEDURE — G8417 CALC BMI ABV UP PARAM F/U: HCPCS | Performed by: ORTHOPAEDIC SURGERY

## 2021-04-14 PROCEDURE — 3017F COLORECTAL CA SCREEN DOC REV: CPT | Performed by: ORTHOPAEDIC SURGERY

## 2021-04-14 PROCEDURE — 99214 OFFICE O/P EST MOD 30 MIN: CPT | Performed by: ORTHOPAEDIC SURGERY

## 2021-04-14 NOTE — LETTER
SURGERY SCHEDULING SHEET    Today's Date: 21    Date of Surgery: 21  Time of Surgery: 8:00  Facility: [facility]  Surgeon Name: Preston Arellano   Assisting:      Account:   Patient: Aleshia Donaldson    : 1965  Address:  [address]  [address] [place] [address]    Procedure: RIGHT HIP ARTHROSCOPY,LABRAL REPAIR, OS Acetabuli Repair vs Resection, Acetabuloplasty  Pre op H&P Yes     CPT Code: 91315, 03609  Insurance: [facility]    Diagnosis:     ICD-10-CM    1. 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  M25.851     M25.751        Length of Procedure:  120 minute  Special Equipment: S&N Hip Distractor , [facility] [place] Pivot Hip Arthroscopy Trays, Conmed Padmaja BirdBeak, [place] Anchors 1.4mm nanoTak, S&N 1.8mm Q fix anchors  Standard C-Arm:  Yes  Mini C-Arm:  No    Anesthesia:   [x] General [x] Block (PENG ONLY) [] MAC [] Local [] Spinal [] Choice  Endo Sedation:    [x] None [] Topical [] Other:     Patient Status:    [x] SDS (OP) [] AMA [] 23 HR OBS [] OIB  [] INPT (RM#)  PCP: Ramon Boggs MD    Blood Thinner: No   Allergies: Allergies   Allergen Reactions    Fioricet [Butalbital-Apap-Caffeine] Other (See Comments)     Dizziness.        IMPLEMENT ATTACHED CLINICAL ORDERS FOR THIS PATIENT  MD SIGNATURE:     Preston Arellano MD  Phone:  [phone]  Fax: [phone]  [facility]: [phone] FX: [phone] [facility] FX: [phone] [facility]: [phone] FX: [phone]

## 2021-04-14 NOTE — PROGRESS NOTES
Chief Complaint  Hip Pain (F/U RIGHT HIP /GROIN PAIN. )      History of Present Illness:  Lyndsay Davidson is a pleasant 63 y.o. female follow-up right hip labral tear in this office acetabular lesion. Ongoing for 3 months. She had about 50% 2-3 hour relief with a freezing injection of her right hip given in the office a week or so ago. She has mechanical symptoms of the right hip especially with flexion IR. Modify duties at work of help but any pushing type stressful activities bother her hip. Pretty specific to the front of her hip. Medical History:  Patient's medications, allergies, past medical, surgical, social and family histories were reviewed and updated as appropriate. Pain Assessment  Location of Pain: Other (Comment)(HIP/GROIN)  Location Modifiers: Right  Severity of Pain: 6  Quality of Pain: Sharp  Aggravating Factors: Other (Comment), Stairs, Walking, Standing(ROTATION R  HIP/ MODERATE PHYSICAL ACTIVITY)  Limiting Behavior: Yes  Relieving Factors: Rest, Nsaids  Result of Injury: No  Work-Related Injury: No  Are there other pain locations you wish to document?: No  ROS: Review of systems reviewed from Patient History Form completed today and available in the patient's chart under the Media tab. Pertinent items are noted in HPI  Review of systems reviewed from Patient History Form completed today and available in the patient's chart under the Media tab. Vital Signs:  Ht 5' 1\" (1.549 m)   Wt 177 lb (80.3 kg)   BMI 33.44 kg/m²         Neuro: Alert & oriented x 3,  normal,  no focal deficits noted. Normal affect. Eyes: sclera clear  Ears: Normal external ear  Mouth:  No perioral lesions  Pulm: Respirations unlabored and regular  Pulse: Extremities well perfused. 2+ peripheral pulses. Skin: Warm. No ulcerations. Constitutional: The physical examination finds the patient to be well-developed and well-nourished.   The patient is alert and oriented x3 and was cooperative throughout the visit. Hip exam    Hip Examination: RIGHT    Skin/Inspection: no skin lesions, cellulitis, or extreme edema in the lower extremities. Standing/Walking: Normal non-antalgic gait, no pelvic tilt, negative Trendelenburg sign. No use of assistive devices    Sitting Exam: 5/5 Hip Flexor Strength, 5/5 Abductor Strength, 5/5 Adductor strength, Negative Straight Leg Raise    Supine Exam: Non tender around the ASIS, AIIS  Flexion arc 0 to 90, with painSpecial Tests: positive deep Flexion Test, positive FADIR , no pain with CIARA, negative resisted sit-up (Athletic Pubalgia). Side Lying Exam: Non-tender at greater trochanter, abductor musculature, nor TFL origin. Abductor side leg raise 5/5 strength. Negative OberTest    Prone Exam: Negative hip flexion contracture, internal rotation to 25 deg, external rotation to 45 deg. Non-tender at the ischial tuberosity nor proximal hamstring        Diagnostics:  Radiology:       Pertinent imaging reviewed, images only - no report available. Radiographs were obtained and reviewed in the office; 3 views: AP Pelvis and right hip 45-deg Patterson View, and right False Profile View    Tonnis ndGndrndanddndend:nd2nd LCEA: 35-40  Alpha Angle: normal    Cross over-sign is negative  Other: Negative Coxa Profunda, Negative Protrusio    Impression: PINCER FORTINO, possible os               Assessment: Patient is a 63 y.o. female with right anterior hip pain due a labral tear, pincer FORTINO, and a non displaced os acetabuli. 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     Orders Placed This Encounter   Procedures    Ambulatory referral to Physical Therapy     Referral Priority:   Routine     Referral Type:   Eval and Treat     Referral Reason:   Specialty Services Required     Number of Visits Requested:   1 Plan:  This hip pain has persisted despite extensive nonoperative treatment focused on hip conditioning, mobility exercises, and activity modification. She has radiologic evidence of a labral tear and an os acetabuli piece. The patient meets the 4 criteria for arthroscopic surgery of the right hip. This includes groin dominant pain, reproducible on exam, with imaging confirming labral tear and FORTINO, and relief with an intra-articular freezing injection of the hip administered in the office through US-guidance. We recommended a RIGHT hip arthroscopy labral repair , and acetabuloplasty vs OS acetabuli stabilization. Risks, benefits, advantages, disadvantages and potential complications as well as the anticipated postoperative course were discussed. We outlined the 80 - 90% success rate of the operation. The risks of infection, bleeding, nerve injury, perineal numbness, sexual dysfunction, hip stiffness, and the possibility of persistent pain and prolonged recovery. We also discussed the involved rehabilitation timelines, no driving for 4 weeks, no need for a hip brace , the general trajectory of improvement after hip surgery (pain relief, activities of daily living, return to sport), full hip loading and strengthening commencing at 3 months, and up to 6 - 7 months before full return to sport and unrestricted activities. The patient agreed to pursue this treatment option. All questions were addressed to their satisfaction. We will book at the next available time. Will require pre op H&P and PT visit. All the patient's questions were answered while in the clinic. The patient is understanding of all instructions and agrees with the plan. Approximately 45 minutes were spent on patient education and coordinating care.   This visit represents high complexity given the medical decision making as well as independent interpretation of exam results and high risk given the management of chronic illnesses and potential for an elective major procedure with identifiable medical risk factors         Follow up in: No follow-ups on file. Sincerely,    Sarah Cordero MD [facility]   [address], [address]  Email: [email or web address]  Office: [phone]    04/14/21  5:08 PM      The encounter with Rafia Trujillo was carried out by myself, Dr Kelli Reyes, who personally examined the patient and reviewed the plan. This dictation was performed with a verbal recognition program (DRAGON) and it was checked for errors. It is possible that there are still dictated errors within this office note. If so, please bring any errors to my attention for an addendum. All efforts were made to ensure that this office note is accurate.

## 2021-04-14 NOTE — LETTER
[address]  [address] [facility] [address]  Phone: [phone]  Fax: [phone]    Sarah Cordero MD        April 14, 2021     Patient: Rafia Trujillo   YOB: 1965   Date of Visit: 4/14/2021       To Whom It May Concern: It is my medical opinion that Barbara Travis may return to work on 4/15/2021- 5/28/21 with the following restrictions: avoid excessive walking or standing and sitting. Light lifting  If you have any questions or concerns, please don't hesitate to call.     Sincerely,          Sarah Cordero MD

## 2021-04-21 ENCOUNTER — TELEPHONE (OUTPATIENT)
Dept: ORTHOPEDIC SURGERY | Age: 56
End: 2021-04-21

## 2021-04-21 NOTE — TELEPHONE ENCOUNTER
General Question     Subject: Patient requesting a call to verify that la paperwork has been received  Patient Azeem Pappas  Contact Number: [phone]

## 2021-04-22 ENCOUNTER — TELEPHONE (OUTPATIENT)
Dept: ORTHOPEDIC SURGERY | Age: 56
End: 2021-04-22

## 2021-04-27 ENCOUNTER — TELEPHONE (OUTPATIENT)
Dept: ORTHOPEDIC SURGERY | Age: 56
End: 2021-04-27

## 2021-04-27 NOTE — TELEPHONE ENCOUNTER
AFTER SX WHEN CAN SHE GO BACK TO WORK AFTER A FEW WEEKS,WHEN WOULD BE THE FIRST DAY BACK TO WORK.  TRYING TO GET HER FINANCES IN ORDER FOR SX.

## 2021-04-30 ENCOUNTER — TELEPHONE (OUTPATIENT)
Dept: ORTHOPEDIC SURGERY | Age: 56
End: 2021-04-30

## 2021-05-03 NOTE — TELEPHONE ENCOUNTER
Auth: # [id number]    Date: 5/28/2021 thru 8/26/2021  Type of SX:  Outpatient  Location: [facility]  CPT: 40153, 23750   DX Code: M25.851, M25.751  SX area: Rt hip  Insurance: [facility]

## 2021-05-04 ENCOUNTER — HOSPITAL ENCOUNTER (OUTPATIENT)
Dept: PHYSICAL THERAPY | Age: 56
Setting detail: THERAPIES SERIES
Discharge: HOME | End: 2021-05-04
Payer: COMMERCIAL

## 2021-05-04 PROCEDURE — 97161 PT EVAL LOW COMPLEX 20 MIN: CPT | Performed by: PHYSICAL THERAPIST

## 2021-05-04 PROCEDURE — 97110 THERAPEUTIC EXERCISES: CPT | Performed by: PHYSICAL THERAPIST

## 2021-05-04 NOTE — FLOWSHEET NOTE
(62806 or ) Provided verbal/tactile cueing for activities related to improving balance, coordination, kinesthetic sense, posture, motor skill, proprioception and motor activation to allow for proper function of core, proximal hip and LE with self care and ADLs  [] (32304) Gait Re-education- Provided training and instruction to the patient for proper LE, core and proximal hip recruitment and positioning and eccentric body weight control with ambulation re-education including up and down stairs     Home Exercise Program:    [x] (57062) Reviewed/Progressed HEP activities related to strengthening, flexibility, endurance, ROM of core, proximal hip and LE for functional self-care, mobility, lifting and ambulation/stair navigation   [] (04121)Reviewed/Progressed HEP activities related to improving balance, coordination, kinesthetic sense, posture, motor skill, proprioception of core, proximal hip and LE for self care, mobility, lifting, and ambulation/stair navigation      Manual Treatments:  PROM / STM / Oscillations-Mobs:  G-I, II, III, IV (PA's, Inf., Post.)  [] (43718) Provided manual therapy to mobilize LE, proximal hip and/or LS spine soft tissue/joints for the purpose of modulating pain, promoting relaxation,  increasing ROM, reducing/eliminating soft tissue swelling/inflammation/restriction, improving soft tissue extensibility and allowing for proper ROM for normal function with self care, mobility, lifting and ambulation.      Modalities:  CP x10'    Charges:  Timed Code Treatment Minutes: 25   Total Treatment Minutes: 45     [x] EVAL (LOW) 77554 (typically 20 minutes face-to-face)  [] EVAL (MOD) 04619 (typically 30 minutes face-to-face)  [] EVAL (HIGH) 12676 (typically 45 minutes face-to-face)  [] RE-EVAL     [x] VC(36159) x 2    [] IONTO  [] NMR (93207) x     [] VASO  [] Manual (57189) x      [] Other:  [] TA x      [] Mech Traction (26238)  [] ES(attended) (24427)      [] ES (un) (01781):     GOALS:   Short Term Goals: To be achieved in: 1 visit  1. Independent in HEP and progression per patient tolerance, in order to prevent re-injury. [] Progressing: [] Met: [] Not Met: [] Adjusted       Progression Towards Functional goals:  [] Patient is progressing as expected towards functional goals listed. [] Progression is slowed due to complexities listed. [] Progression has been slowed due to co-morbidities. [x] Plan just implemented, too soon to assess goals progression  [] Other:     ASSESSMENT:  See eval    Treatment/Activity Tolerance:  [x] Patient tolerated treatment well [] Patient limited by fatique  [] Patient limited by pain  [] Patient limited by other medical complications  [] Other:     Prognosis: [x] Good [] Fair  [] Poor    Patient Requires Follow-up: [x] Yes  [] No    PLAN: See eval  [] Continue per plan of care [] Alter current plan (see comments)  [x] Plan of care initiated [] Hold pending MD visit [] Discharge      Electronically signed by: Evan Jerry PT, OMT-C      Note: If patient does not return for scheduled/ recommended follow up visits, this note will serve as a discharge from care along with most recent update on progress.

## 2021-05-04 NOTE — PLAN OF CARE
a                                                         [facility]  Orthopaedics and [address] [place] [address]. Kala Rankin, [address] [facility]  Phone: [phone]   Fax:     [phone]                                                       Physical Therapy Certification    Dear Referring Practitioner: Dr Elva Erazo,    We had the pleasure of evaluating the following patient for physical therapy services at [address] [place] [address]. A summary of our findings can be found in the initial assessment below. This includes our plan of care. If you have any questions or concerns regarding these findings, please do not hesitate to contact me at the office phone number checked above. Thank you for the referral.       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   [] Yes, Patient intake triggered further evaluation      [] C-SSRS Screening completed  [] PCP notified via Plan of Care  [] Emergency services notified    SUBJECTIVE: Patient stated complaint:  Gradual onset of anterior hip pain over the past 2-3 months.   Had difficulty with putting not already being addressed at this time. Co-morbidities/Complexities (which will affect course of rehabilitation):   []None           Arthritic conditions   []Rheumatoid arthritis (M05.9)  []Osteoarthritis (M19.91)   Cardiovascular conditions   []Hypertension (I10)  []Hyperlipidemia (E78.5)  []Angina pectoris (I20)  []Atherosclerosis (I70)   Musculoskeletal conditions   []Disc pathology   []Congenital spine pathologies   []Prior surgical intervention  []Osteoporosis (M81.8)  []Osteopenia (M85.8)   Endocrine conditions   []Hypothyroid (E03.9)  []Hyperthyroid Gastrointestinal conditions   []Constipation (A82.86)   Metabolic conditions   []Morbid obesity (E66.01)  []Diabetes type 1(E10.65) or 2 (E11.65)   []Neuropathy (G60.9)     Pulmonary conditions   []Asthma (J45)  []Coughing   []COPD (J44.9)   Psychological Disorders  []Anxiety (F41.9)  []Depression (F32.9)   []Other:   [x]Other:    Cardiac ablation        Barriers to/and or personal factors that will affect rehab potential:              []Age  []Sex              []Motivation/Lack of Motivation                        []Co-Morbidities              []Cognitive Function, education/learning barriers              []Environmental, home barriers              []profession/work barriers  []past PT/medical experience  []other:  Justification:     PACEMAKER:  - Denied having a pacemaker that would contraindicate the use of electrical modalities. METAL IMPLANTS:  - Denied metal implants that would contraindicate the use of thermal modalities. CANCER HISTORY:  - Denied a history of cancer that would contraindicate thermal modalities. Falls Risk Assessment (30 days):   [x] Falls Risk assessed and no intervention required.   [] Falls Risk assessed and Patient requires intervention due to being higher risk   TUG score (>12s at risk):     [] Falls education provided, including       ASSESSMENT:   Functional Impairments:     [x]Noted lumbar/proximal hip/LE joint hypomobility   [x]Decreased LE functional ROM   [x]Decreased core/proximal hip strength and neuromuscular control   [x]Decreased LE functional strength   [x]Reduced balance/proprioceptive control   []other:      Functional Activity Limitations (from functional questionnaire and intake)   []Reduced ability to tolerate prolonged functional positions   [x]Reduced ability or difficulty with changes of positions or transfers between positions   []Reduced ability to maintain good posture and demonstrate good body mechanics with sitting, bending, and lifting   []Reduced ability to sleep   [] Reduced ability or tolerance with driving and/or computer work   [x]Reduced ability to perform lifting, carrying tasks   [x]Reduced ability to squat   []Reduced ability to forward bend   [x]Reduced ability to ambulate prolonged functional periods/distances/surfaces   [x]Reduced ability to ascend/descend stairs   [x]Reduced ability to run, hop, cut or jump   []other:    Participation Restrictions   [x]Reduced participation in self care activities   [x]Reduced participation in home management activities   [x]Reduced participation in work activities   [x]Reduced participation in social activities. [x]Reduced participation in sport/recreation activities. Classification :    []Signs/symptoms consistent with post-surgical status including decreased ROM, strength and function.    []Signs/symptoms consistent with joint sprain/strain   []Signs/symptoms consistent with patella-femoral syndrome   []Signs/symptoms consistent with knee OA/hip OA   [x]Signs/symptoms consistent with internal derangement of knee/Hip   []Signs/symptoms consistent with functional hip weakness/NMR control      []Signs/symptoms consistent with tendinitis/tendinosis    []signs/symptoms consistent with pathology which may benefit from Dry needling      []other:      Prognosis/Rehab Potential:      []Excellent   [x]Good    []Fair   []Poor    Tolerance of evaluation/treatment:    []Excellent   [x]Good    []Fair   []Poor    Physical Therapy Evaluation Complexity Justification  [x] A history of present problem with:  [] no personal factors and/or comorbidities that impact the plan of care;  [x]1-2 personal factors and/or comorbidities that impact the plan of care  []3 personal factors and/or comorbidities that impact the plan of care  [x] An examination of body systems using standardized tests and measures addressing any of the following: body structures and functions (impairments), activity limitations, and/or participation restrictions;:  [] a total of 1-2 or more elements   [] a total of 3 or more elements   [x] a total of 4 or more elements   [x] A clinical presentation with:  [x] stable and/or uncomplicated characteristics   [] evolving clinical presentation with changing characteristics  [] unstable and unpredictable characteristics;   [x] Clinical decision making of [x] low, [] moderate, [] high complexity using standardized patient assessment instrument and/or measurable assessment of functional outcome. [x] EVAL (LOW) 10364 (typically 20 minutes face-to-face)  [] EVAL (MOD) 22279 (typically 30 minutes face-to-face)  [] EVAL (HIGH) 23419 (typically 45 minutes face-to-face)  [] RE-EVAL     PLAN  Frequency/Duration:  1 visit prior to surgery for HEP  Interventions:  1. Therapeutic exercise including: strength training, ROM/flexibility, NMR and proprioception for the proximal hip, core and Lower extremity  2. Manual therapy as indicated including Dry Needling/IASTM, STM, PROM, Gr I-IV mobilizations, spinal mobilization/manipulation. 3. Modalities as needed including: thermal agents, E-stim, US, iontophoresis as indicated. 4. Patient education on joint protection, activity modification, progression of HEP. HEP instruction: Can be found scanned in media file. (see scanned forms)    GOALS:  Patient stated goal: walk pain free        Therapist goals for Patient:   Short Term Goals: To be achieved in: 1 visit  1. Independent in HEP and progression per patient tolerance, in order to prevent re-injury.   [] Progressing: [] Met: [] Not Met: [] Adjusted           Electronically signed by:  Cassandra Mauricio PT, OMT-C

## 2021-05-13 ENCOUNTER — OFFICE VISIT (OUTPATIENT)
Dept: FAMILY MEDICINE CLINIC | Age: 56
End: 2021-05-13
Payer: COMMERCIAL

## 2021-05-13 VITALS
BODY MASS INDEX: 33.75 KG/M2 | DIASTOLIC BLOOD PRESSURE: 70 MMHG | SYSTOLIC BLOOD PRESSURE: 112 MMHG | TEMPERATURE: 97.5 F | OXYGEN SATURATION: 97 % | HEART RATE: 97 BPM | RESPIRATION RATE: 12 BRPM | WEIGHT: 178.6 LBS

## 2021-05-13 DIAGNOSIS — M43.16 SPONDYLOLISTHESIS OF LUMBAR REGION: ICD-10-CM

## 2021-05-13 DIAGNOSIS — M25.551 CHRONIC RIGHT HIP PAIN: ICD-10-CM

## 2021-05-13 DIAGNOSIS — F33.0 MAJOR DEPRESSIVE DISORDER, RECURRENT EPISODE, MILD (HCC): ICD-10-CM

## 2021-05-13 DIAGNOSIS — Z98.890 POSTOPERATIVE NAUSEA: ICD-10-CM

## 2021-05-13 DIAGNOSIS — R11.0 POSTOPERATIVE NAUSEA: ICD-10-CM

## 2021-05-13 DIAGNOSIS — F90.0 ATTENTION DEFICIT HYPERACTIVITY DISORDER (ADHD), PREDOMINANTLY INATTENTIVE TYPE: ICD-10-CM

## 2021-05-13 DIAGNOSIS — M47.816 SPONDYLOSIS OF LUMBAR REGION WITHOUT MYELOPATHY OR RADICULOPATHY: ICD-10-CM

## 2021-05-13 DIAGNOSIS — K58.1 IRRITABLE BOWEL SYNDROME WITH CONSTIPATION: ICD-10-CM

## 2021-05-13 DIAGNOSIS — M24.051 LOOSE BODY IN RIGHT HIP: ICD-10-CM

## 2021-05-13 DIAGNOSIS — Z01.818 PRE-OP EXAM: Primary | ICD-10-CM

## 2021-05-13 DIAGNOSIS — G89.29 CHRONIC RIGHT HIP PAIN: ICD-10-CM

## 2021-05-13 PROCEDURE — 99214 OFFICE O/P EST MOD 30 MIN: CPT | Performed by: FAMILY MEDICINE

## 2021-05-13 PROCEDURE — 3017F COLORECTAL CA SCREEN DOC REV: CPT | Performed by: FAMILY MEDICINE

## 2021-05-13 PROCEDURE — G8427 DOCREV CUR MEDS BY ELIG CLIN: HCPCS | Performed by: FAMILY MEDICINE

## 2021-05-13 PROCEDURE — G8417 CALC BMI ABV UP PARAM F/U: HCPCS | Performed by: FAMILY MEDICINE

## 2021-05-13 PROCEDURE — 93000 ELECTROCARDIOGRAM COMPLETE: CPT | Performed by: FAMILY MEDICINE

## 2021-05-13 PROCEDURE — 1036F TOBACCO NON-USER: CPT | Performed by: FAMILY MEDICINE

## 2021-05-13 RX ORDER — METHYLPHENIDATE HYDROCHLORIDE 36 MG/1
36 TABLET ORAL EVERY MORNING
Qty: 30 TABLET | Refills: 0 | Status: SHIPPED | OUTPATIENT
Start: 2021-06-08 | End: 2021-07-21 | Stop reason: SDUPTHER

## 2021-05-13 RX ORDER — HYDROCODONE BITARTRATE AND ACETAMINOPHEN 5; 325 MG/1; MG/1
1 TABLET ORAL EVERY 8 HOURS PRN
Qty: 30 TABLET | Refills: 0 | Status: SHIPPED | OUTPATIENT
Start: 2021-05-13 | End: 2021-05-23

## 2021-05-13 NOTE — PROGRESS NOTES
Subjective:    Chief Complaint:     Karen Sterling is a 63 y.o. female who presents for a preoperative physical examination. She is scheduled to have R hip surgery/arthroscopy of R hip, due to chronic persistent pain, labral tear done by Dr. Sally Gallardo at [facility] on 2021. Pt has had chronic persistent pain issues and deep pain. Pt has been very active at work doing a lot of lifting. On light duty, sx are not as bad but still present. Pain does increase with internal/external range of motion. Pt can ambulate, but with discomfort. Pt is taking norco prn,  And does need refills up until surgery.   Pt does have some anxiety but also excited to get it done        History of Present Illness:        Past Medical History:   Diagnosis Date    Acid reflux     Acquired cyst of kidney     ADHD (attention deficit hyperactivity disorder) 10/1/2013    DDD (degenerative disc disease), cervical 2021    DDD (degenerative disc disease), lumbar 2013    Depression with anxiety 3/10/2021    Depressive disorder, not elsewhere classified 2010    Hiatal hernia 3/7/2016    History of loop recorder placed 2 years ago    Irritable bowel syndrome 2010    Migraine, unspecified, without mention of intractable migraine without mention of status migrainosus 2010    Palpitations 2010    Pancreatitis     Pneumonia childhood    Reflux esophagitis 2010        Review of patient's past surgical history indicates:     Past Surgical History:   Procedure Laterality Date    ABLATION OF DYSRHYTHMIC FOCUS      APPENDECTOMY       SECTION      COLONOSCOPY  2018    COLONOSCOPY WITH BIOPSY performed by Licha Kincaid MD at [address] [place] [address]      laparoscopic    OVARY REMOVAL      bilateral    NE COLONOSCOPY FLX DX W/COLLJ SPEC WHEN PFRMD N/A 2018    COLONOSCOPY WITH MAC performed by Licha Kincaid MD at [facility]                                               No anesthesia complications except does get post op nausea concerns. Current Outpatient Medications   Medication Sig Dispense Refill    methylphenidate (CONCERTA) 36 MG extended release tablet Take 1 tablet by mouth every morning for 30 days. 30 tablet 0    methylphenidate (CONCERTA) 36 MG extended release tablet Take 1 tablet by mouth every morning for 30 days. 30 tablet 0    methylphenidate (CONCERTA) 36 MG extended release tablet Take 1 tablet by mouth daily for 30 days. 30 tablet 0    hydrOXYzine (ATARAX) 25 MG tablet Take 1 tablet by mouth every 8 hours as needed for Anxiety 30 tablet 5    albuterol sulfate HFA (PROVENTIL HFA) 108 (90 Base) MCG/ACT inhaler Inhale 2 puffs into the lungs every 6 hours as needed for Wheezing 1 Inhaler 3    omeprazole (PRILOSEC) 40 MG delayed release capsule Take 1 capsule by mouth daily 30 capsule 5    methylphenidate (CONCERTA) 36 MG extended release tablet Take 1 tablet by mouth every morning for 30 days. 30 tablet 0    desvenlafaxine succinate (PRISTIQ) 50 MG TB24 extended release tablet Take 1 tablet by mouth daily (Patient not taking: Reported on 4/7/2021) 30 tablet 3    Crisaborole (EUCRISA) 2 % OINT Apply to affected area QD as directed (Patient not taking: Reported on 3/10/2021) 100 g 5     No current facility-administered medications for this visit. Allergies   Allergen Reactions    Fioricet [Butalbital-Apap-Caffeine] Other (See Comments)     Dizziness. Social History     Tobacco Use    Smoking status: Never Smoker    Smokeless tobacco: Never Used   Substance Use Topics    Alcohol use: Yes     Alcohol/week: 1.0 standard drinks     Types: 1 Glasses of wine per week     Comment: once a week    Drug use: No        No family history on file.      Review Of Systems    Skin: no abnormal pigmentation, rash, scaling, itching, masses, hair or nail changes  Eyes: negative Ears/Nose/Throat: negative  Respiratory: negative  Cardiovascular: negative  Gastrointestinal: negative  Genitourinary: negative  Musculoskeletal: + as above   Neurologic: negative  Psychiatric: negative  Hematologic/Lymphatic/Immunologic: negative  Endocrine: negative         Objective:      /70   Pulse 97   Temp 97.5 °F (36.4 °C) (Temporal)   Resp 12   Wt 178 lb 9.6 oz (81 kg)   SpO2 97%   BMI 33.75 kg/m²   General appearance - healthy, alert, no distress  Skin - Skin color, texture, turgor normal. No rashes or lesions. Head - Normocephalic. No masses, lesions, tenderness or abnormalities  Eyes - conjunctivae/corneas clear. PERRL, EOM's intact. Ears - External ears normal. Canals clear. TM's normal.  Nose/Sinuses - Nares normal. Septum midline. Mucosa normal. No drainage or sinus tenderness. Oropharynx - Lips, mucosa, and tongue normal. Teeth and gums normal.   Neck - Neck supple. No adenopathy. Thyroid symmetric, normal size,  Back - Back symmetric, no curvature. ROM normal. No CVA tenderness. Lungs - Percussion normal. Good diaphragmatic excursion. Lungs clear  Heart - Regular rate and rhythm, with no rub, murmur or gallop noted. Abdomen - Abdomen soft, non-tender. BS normal. No masses, organomegaly  Extremities - Extremities normal. No deformities, edema, or skin discoloration  Musculoskeletal - Spine ROM normal. Muscular strength intact. Peripheral pulses - radial=4/4,, femoral=4/4, popliteal=4/4, dorsalis pedis=4/4,  Neuro - Gait normal. Reflexes normal and symmetric. Sensation grossly normal.  No focal weakness    EKG: normal EKG, normal sinus rhythm, unchanged from previous tracings. ASSESSMENT / PLAN:    1. Pre-op exam  Medically cleared for surgery. EKG normal  - EKG 12 Lead    2. Chronic right hip pain  Persistent with + labral tear, free bone fragment  S/p eval by ortho, set for surgery  Medically cleared for surgery. 3. Loose body in right hip  As above    4. Spondylolisthesis of lumbar region  Stable w/o flare    5. Spondylosis of lumbar region without myelopathy or radiculopathy  Stable w/o flare    6. Postoperative nausea  Monitor with upcoming surgery    7. Major depressive disorder, recurrent episode, mild (HCC)  Doing well, cont supportive therapy    8. Attention deficit hyperactivity disorder (ADHD), predominantly inattentive type  Stable, cont supportive therapy    9. Irritable bowel syndrome with constipation  Stable w/o flare           Follow-up appointment:   After surgery/prn    Discussed use, benefit, and side effects of all prescribed medications. Barriers to medication compliance addressed. All patient questions answered. Pt voiced understanding. When applicable, patient's outside records were reviewed through [facility]. The patient has signed appropriate paperworks/consents. Per encounter diagnoses   He is medically cleared for surgery and anesthesia. Avoid Aspirin, non steroidal anti inflammatory medications, including Motrin, Aleve, Ibuprofen, Advil; multi vitamins, Vitamin E, omega 3 fish oil, and glucosamine chondroitin for the 7 days prior to surgery.

## 2021-05-24 ENCOUNTER — OFFICE VISIT (OUTPATIENT)
Dept: PRIMARY CARE CLINIC | Age: 56
End: 2021-05-24
Payer: COMMERCIAL

## 2021-05-24 DIAGNOSIS — Z01.818 PRE-OP EXAMINATION: Primary | ICD-10-CM

## 2021-05-24 LAB — SARS-COV-2: NOT DETECTED

## 2021-05-24 PROCEDURE — 99421 OL DIG E/M SVC 5-10 MIN: CPT | Performed by: NURSE PRACTITIONER

## 2021-05-25 NOTE — PROGRESS NOTES
PRE-OP INSTRUCTIONS FOR THE SURGICAL PATIENT YOU ARE UNABLE TO MAKE CONTACT FOR AN INTERVIEW:    All patients having surgery or anesthesia are required to be Covid tested OR to have been vaccinated at least 14 days prior to your procedure. It is very important to return our call to [phone] to notify the staff of your last vaccination date or you will be required to complete Covid testing within the 5-6 days prior to surgery & quarantine. We recommend coming to the [facility] flu tent to complete. It is open Monday-Friday 8am-3pm currently. If you go outside [facility] you need to ensure you have a PCR Covid test and fax results to PreAdmission Testing at [phone]. 1. Follow all instructions provided to you from your surgeons office, including your ARRIVAL TIME. 2. Enter the MAIN entrance located on [address] and report to the desk. 3. Bring your insurance & photo ID with you. You may also be asked to pay a co-pay, as you may want to bring a check or credit card with you. 4. Leave all other valuables at home. 5. Arrange for someone to drive you home and be with you for the first 24 hours after discharge. 6. Bring your medication list with you day of surgery with doses and frequency listed (including over the counter medications)  7. You must contact your surgeon for Instructions regarding:              - ALL medication instructions, especially if taking blood thinners, aspirin, or diabetic medication.  - IF  there is a change in your physical condition such as a cold, fever, rash, cuts, sores or any other infection, especially near your surgical site. 8. A Pre-op History and Physical for surgery MUST be completed by your Physician or an Urgent Care within 30 days of your procedure date. Please bring a copy with you on the day of your procedure and along with any other testing performed. 9. DO NOT EAT ANYTHING eight hours prior to arrival for surgery. May have up to 8 ounces of water 4 hours prior to arrival for surgery. NOTE: ALL Gastric, Bariatric and Bowel surgery patients MUST follow their surgeon's instructions. 10. No gum, candy, mints, or ice chips day of procedure. 11. Please refrain from drinking alcohol the day before or day of your procedure. 12. Please do not smoke the day of your procedure. 13. Dress in loose, comfortable clothing appropriate for redressing after your procedure. Do not wear jewelry (including body piercings), make-up, fingernail polish, lotion, powders or metal hairclips. 15. Contacts will need to be removed prior to surgery. You may want to bring your eye glasses to wear immediately before and after surgery. 14. Dentures will need to be removed before your procedure. 13. Bring cases for your glasses, contacts, dentures, or hearing aids to protect them while you are in surgery. 16. If you use a CPAP, please bring it with you on the day of your procedure. 17. Do not shave or wax for 72 hours prior to procedure near your operative site  18. FOR WOMAN OF CHILDBEARING AGE ONLY- please make sure we can collect a urine sample on arrival.     If you have further questions, you may contact your surgeon's office or us at [phone]     Left instructions on patient's voicemail.     Mary Silva RN.5/25/2021 .3:00 PM

## 2021-05-25 NOTE — PROGRESS NOTES
[facility] / [facility] ([facility]) [address], [address]    Acknowledgment of Informed Consent for Surgical or Medical Procedure and Sedation  I agree to allow doctor(s) Igor Lockwood and his/her associates or assistants, including residents and/or other qualified medical practitioner to perform the following medical treatment or procedure and to administer or direct the administration of sedation as necessary:  Procedure(s): RIGHT HIP ARTHROSCOPY, LABRAL REPAIR, OS ACETABULI REPAIR VERSUS RESECTION, ACETABULOPLASTY    My doctor has explained the following regarding the proposed procedure:   the explanation of the procedure   the benefits of the procedure   the potential problems that might occur during recuperation   the risks and side effects of the procedure which could include but are not limited to severe blood loss, infection, stroke or death   the benefits, risks and side effect of alternative procedures including the consequences of declining this procedure or any alternative procedures   the likelihood of achieving satisfactory results. I acknowledge no guarantee or assurance has been made to me regarding the results. I understand that during the course of this treatment/procedure, unforeseen conditions can occur which require an additional or different procedure. I agree to allow my physician or assistants to perform such extension of the original procedure as they may find necessary. I understand that sedation will often result in temporary impairment of memory and fine motor skills and that sedation can occasionally progress to a state of deep sedation or general anesthesia. I understand the risks of anesthesia for surgery include, but are not limited to, sore throat, hoarseness, injury to face, mouth, or teeth; nausea; headache; injury to blood vessels or nerves; death, brain damage, or paralysis.     I understand that if I have a Limitation of Treatment order in effect during my hospitalization, the order may or may not be in effect during this procedure. I give my doctor permission to give me blood or blood products. I understand that there are risks with receiving blood such as hepatitis, AIDS, fever, or allergic reaction. I acknowledge that the risks, benefits, and alternatives of this treatment have been explained to me and that no express or implied warranty has been given by the hospital, any blood bank, or any person or entity as to the blood or blood components transfused. At the discretion of my doctor, I agree to allow observers, equipment/product representatives and allow photographing, and/or televising of the procedure, provided my name or identity is maintained confidentially. I agree the hospital may dispose of or use for scientific or educational purposes any tissue, fluid, or body parts which may be removed.     ________________________________Date________Time______ am/pm  ([place] [facility])  Patient or Signature of Closest Relative or Legal Guardian    ________________________________Date________Time______am/pm      Page 1 of  1  Witness

## 2021-05-26 ENCOUNTER — OFFICE VISIT (OUTPATIENT)
Dept: ORTHOPEDIC SURGERY | Age: 56
End: 2021-05-26
Payer: COMMERCIAL

## 2021-05-26 ENCOUNTER — HOSPITAL ENCOUNTER (OUTPATIENT)
Dept: PHYSICAL THERAPY | Age: 56
Setting detail: THERAPIES SERIES
Discharge: HOME | End: 2021-05-26
Payer: COMMERCIAL

## 2021-05-26 VITALS — WEIGHT: 178 LBS | HEIGHT: 61 IN | BODY MASS INDEX: 33.61 KG/M2

## 2021-05-26 DIAGNOSIS — M25.851 FEMOROACETABULAR IMPINGEMENT WITH OSTEOPHYTE OF RIGHT HIP: Primary | ICD-10-CM

## 2021-05-26 DIAGNOSIS — M25.751 FEMOROACETABULAR IMPINGEMENT WITH OSTEOPHYTE OF RIGHT HIP: Primary | ICD-10-CM

## 2021-05-26 PROCEDURE — L1686 HO POST-OP HIP ABDUCTION: HCPCS | Performed by: ORTHOPAEDIC SURGERY

## 2021-05-26 PROCEDURE — 97530 THERAPEUTIC ACTIVITIES: CPT

## 2021-05-26 PROCEDURE — 99214 OFFICE O/P EST MOD 30 MIN: CPT | Performed by: ORTHOPAEDIC SURGERY

## 2021-05-26 PROCEDURE — G8427 DOCREV CUR MEDS BY ELIG CLIN: HCPCS | Performed by: ORTHOPAEDIC SURGERY

## 2021-05-26 PROCEDURE — 1036F TOBACCO NON-USER: CPT | Performed by: ORTHOPAEDIC SURGERY

## 2021-05-26 PROCEDURE — 3017F COLORECTAL CA SCREEN DOC REV: CPT | Performed by: ORTHOPAEDIC SURGERY

## 2021-05-26 PROCEDURE — G8417 CALC BMI ABV UP PARAM F/U: HCPCS | Performed by: ORTHOPAEDIC SURGERY

## 2021-05-26 RX ORDER — SENNOSIDES 8.6 MG
1 TABLET ORAL 2 TIMES DAILY
Qty: 14 TABLET | Refills: 0 | Status: SHIPPED | OUTPATIENT
Start: 2021-05-26 | End: 2021-06-02

## 2021-05-26 RX ORDER — ASPIRIN 81 MG/1
81 TABLET ORAL 2 TIMES DAILY
Qty: 28 TABLET | Refills: 0 | Status: SHIPPED | OUTPATIENT
Start: 2021-05-26 | End: 2021-07-21

## 2021-05-26 RX ORDER — OXYCODONE HYDROCHLORIDE AND ACETAMINOPHEN 5; 325 MG/1; MG/1
1 TABLET ORAL EVERY 6 HOURS PRN
Qty: 20 TABLET | Refills: 0 | Status: SHIPPED | OUTPATIENT
Start: 2021-05-26 | End: 2021-05-31

## 2021-05-26 RX ORDER — TRAMADOL HYDROCHLORIDE 50 MG/1
50 TABLET ORAL EVERY 6 HOURS PRN
Qty: 20 TABLET | Refills: 0 | Status: SHIPPED | OUTPATIENT
Start: 2021-05-26 | End: 2021-05-31

## 2021-05-26 RX ORDER — NAPROXEN 500 MG/1
500 TABLET ORAL 2 TIMES DAILY WITH MEALS
Qty: 28 TABLET | Refills: 0 | Status: SHIPPED | OUTPATIENT
Start: 2021-05-26 | End: 2021-06-07

## 2021-05-26 NOTE — PROGRESS NOTES
Chief Complaint  Hip Pain (PRE OP RIGHT HIP SURGERY )      History of Present Illness:  Kavin Chow is a pleasant 63 y.o. female for preop visit for upcoming right hip arthroscopy, possible labral repair, possible os acetabuli stabilization, possible pincer decompression, possible labral reconstruction. She still has ongoing worsening pain anteriorly in the hip reproducible with bending or sitting. She is taking anti-inflammatory medicine and Norco.      She has had her preoperative visit and no concerns. Ongoing for 4 months. She had about 50% 2-3 hour relief with a freezing injection of her right hip given in the office  She has mechanical symptoms of the right hip especially with flexion IR. Modify duties at work of some help but any pushing type stressful activities bother her hip. Pretty specific to the front of her hip. Medical History:  Patient's medications, allergies, past medical, surgical, social and family histories were reviewed and updated as appropriate. Pain Assessment  Location of Pain:  (HIP)  Location Modifiers: Right  Severity of Pain: 10  Quality of Pain: Sharp, Throbbing, Aching  Duration of Pain: Persistent  Frequency of Pain: Constant  Aggravating Factors: Bending, Stretching, Straightening, Exercise, Standing, Walking, Stairs  Limiting Behavior: Yes  Result of Injury: No  Work-Related Injury: No  ROS: Review of systems reviewed from Patient History Form completed today and available in the patient's chart under the Media tab. Pertinent items are noted in HPI  Review of systems reviewed from Patient History Form completed today and available in the patient's chart under the Media tab. Vital Signs:  Ht 5' 1\" (1.549 m)   Wt 178 lb (80.7 kg)   BMI 33.63 kg/m²         Neuro: Alert & oriented x 3,  normal,  no focal deficits noted. Normal affect.   Eyes: sclera clear  Ears: Normal external ear  Mouth:  No perioral lesions  Pulm: Respirations unlabored and regular Pulse: Extremities well perfused. 2+ peripheral pulses. Skin: Warm. No ulcerations. Constitutional: The physical examination finds the patient to be well-developed and well-nourished. The patient is alert and oriented x3 and was cooperative throughout the visit. Hip exam    Hip Examination: RIGHT    Skin/Inspection: no skin lesions, cellulitis, or extreme edema in the lower extremities. Standing/Walking: Normal non-antalgic gait, no pelvic tilt, negative Trendelenburg sign. No use of assistive devices    Sitting Exam: 5/5 Hip Flexor Strength, 5/5 Abductor Strength, 5/5 Adductor strength, Negative Straight Leg Raise    Supine Exam: Non tender around the ASIS, AIIS  Flexion arc 0 to 90, with painSpecial Tests: positive deep Flexion Test, positive FADIR , much less pain with CIARA, negative resisted sit-up (Athletic Pubalgia). Side Lying Exam: today she is tender at greater trochanter, abductor musculature, nor TFL origin. Abductor side leg raise 5/5 strength. Negative OberTest    Prone Exam: Negative hip flexion contracture, internal rotation to 25 deg, external rotation to 45 deg. Non-tender at the ischial tuberosity nor proximal hamstring        Diagnostics:  Radiology:       Pertinent imaging reviewed, images only - no report available. Radiographs were obtained and reviewed in the office; 3 views: AP Pelvis and right hip 45-deg Patterson View, and right False Profile View    Tonnis ndGndrndanddndend:nd2nd LCEA: 45  Alpha Angle: normal    Cross over-sign is negative  Other: Negative Coxa Profunda, Negative Protrusio    Impression: PINCER FORTINO, possible os acetabuli. MRI Right hip 4/1/2021:  Impression       Degenerative arthropathic subchondral cysts at the right superolateral acetabulum       Right trochanteric bursitis       Insertional partial thickness tear at the right hamstring tendon at the level of the ischio tuberosity       Insertional left hamstring tendinosis.               Assessment: Patient is a 63 y.o. female with right anterior hip pain due a labral tear, pincer FORTINO, and a non displaced os acetabuli. 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 Dispense:  20 tablet     Refill:  0     Reduce doses taken as pain becomes manageable    senna (SENOKOT) 8.6 MG TABS tablet     Sig: Take 1 tablet by mouth 2 times daily for 7 days     Dispense:  14 tablet     Refill:  0    traMADol (ULTRAM) 50 MG tablet     Sig: Take 1 tablet by mouth every 6 hours as needed for Pain for up to 5 days. Dispense:  20 tablet     Refill:  0     Reduce doses taken as pain becomes manageable     No orders of the defined types were placed in this encounter. Plan: This hip pain has persisted despite extensive nonoperative treatment focused on hip conditioning, mobility exercises, and activity modification. She has radiologic evidence of a labral tear and an os acetabuli piece. The patient meets the 4 criteria for arthroscopic surgery of the right hip. This includes groin dominant pain, reproducible on exam, with imaging confirming labral tear and FORTINO, and relief with an intra-articular freezing injection of the hip administered in the office through [facility]. We recommended a RIGHT hip arthroscopy labral repair possible labral reconstruction, and acetabuloplasty vs OS acetabuli stabilization.   I will also assess for possible greater trochanteric bursectomy. Risks, benefits, advantages, disadvantages and potential complications as well as the anticipated postoperative course were discussed. We outlined the 75 - 85% success rate of the operation. The risks of infection, bleeding, nerve injury, perineal numbness, sexual dysfunction, hip stiffness, and the possibility of persistent pain and prolonged recovery. We also discussed the involved rehabilitation timelines, no driving for 4 weeks, a need for a hip brace , the general trajectory of improvement after hip surgery (pain relief, activities of daily living, return to sport), full hip loading and strengthening commencing at 3 months, and up to 6 - 7 months before full return to sport and unrestricted activities. The patient agreed to pursue this treatment option. All questions were addressed to their satisfaction. She is cleared for surgery. No personal history of diabetes, or blood pressure issues  No personal or family history of bleeding  No personal or family history of DVT/PE  +ve personal GI issues and intolerance to NSAIDS and hiatal hernia. Can take Naprosyn while on prilosec, but to stop if any GI side effect. No personal or family history of problems with Anaesthesia  No abx allergy  No latex allergy    We discussed the surgical process, postoperative recovery, the medications to be taken, and wound care instructions and hip brace (ossur rebound) instructions and fitting. Prescriptions were printed and given to the patient today. All the patient's questions were answered while in the clinic. The patient is understanding of all instructions and agrees with the plan. Approximately 45 minutes were spent on patient education and coordinating care.   This visit represents high complexity given the medical decision making as well as independent interpretation of exam results and high risk given the management of chronic illnesses and potential for an elective major procedure with identifiable medical risk factors       Follow up in: No follow-ups on file. Sincerely,    Rory Vega MD [address] [facility]   [address], [address]  Email: [email or web address]  Office: [phone]    05/26/21  8:59 AM      The encounter with Harsha Dorsey was carried out by myself, Dr Teena Huber, who personally examined the patient and reviewed the plan. This dictation was performed with a verbal recognition program (DRAGON) and it was checked for errors. It is possible that there are still dictated errors within this office note. If so, please bring any errors to my attention for an addendum. All efforts were made to ensure that this office note is accurate.

## 2021-05-26 NOTE — PLAN OF CARE
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  · Treatment Diagnosis: PT treatment diagnosis:  right hip pain  A48.630  Insurance/Certification information:   [facility]  20 visits/yr,  $40 copay  Physician Information:  Referring Practitioner: Dr Sudeep Spangler of care signed (Y/N):     Date of Patient follow up with Physician:     Is this a Progress Report:     [x]  Yes  []  No        If Yes:  Date Range for reporting period:  Beginning  Ending    Progress report will be due (10 Rx or 30 days whichever is less):        Recertification will be due (POC Duration  / 90 days whichever is less):          Visit # Insurance Allowable Auth Required   2 20 []  Yes []  No        Functional Scale:   LEFS:    Date assessed:   21     Latex Allergy:  [x]NO      []YES  Preferred Language for Healthcare:   [x]English       []other    Pain level:  1-10/10     SUBJECTIVE:  Patient reports she is undergoing surgery 21. Patient states she is ready to proceed with the surgery due to severe pain with all movements of the R hip. Patient reports she is looking forward to experiencing less R hip pain and to regain function.        Type: [x]Constant   []Intermitment  []Radiating [x]Localized  []other:     Functional Limitations: [x]Sitting [x]Standing [x]Walking    [x]Squatting [x]Stairs                [x]ADL's  []Driving []Sports/Recreation  []Other:      OBJECTIVE: 21      ROM LEFT RIGHT   HIP Flex 116 90   HIP Abd       HIP Ext   40   HIP IR 20 25   HIP ER 45     Knee ext       Knee Flex       Ankle PF       Ankle DF       Ankle In       Ankle Ev       Strength  LEFT RIGHT   HIP Flexors 19.1 lb 4- - 7 x weekly - 2 sets - 10 reps  Clamshell - 2 x daily - 7 x weekly - 2 sets - 10 reps  Sidelying Hip Abduction - 2 x daily - 7 x weekly - 2 sets - 10 reps  Modified Fredy Stretch - 2 x daily - 7 x weekly - 3 minutes hold      Therapeutic Exercise and NMR EXR  [] (26168) Provided verbal/tactile cueing for activities related to strengthening, flexibility, endurance, ROM for improvements in LE, proximal hip, and core control with self care, mobility, lifting, ambulation.  [] (74424) Provided verbal/tactile cueing for activities related to improving balance, coordination, kinesthetic sense, posture, motor skill, proprioception  to assist with LE, proximal hip, and core control in self care, mobility, lifting, ambulation and eccentric single leg control.      NMR and Therapeutic Activities:    [] (84499 or 94280) Provided verbal/tactile cueing for activities related to improving balance, coordination, kinesthetic sense, posture, motor skill, proprioception and motor activation to allow for proper function of core, proximal hip and LE with self care and ADLs  [x] (19149) Gait Re-education- Provided training and instruction to the patient for proper LE, core and proximal hip recruitment and positioning and eccentric body weight control with ambulation re-education including up and down stairs     Home Exercise Program:    [x] (91503) Reviewed/Progressed HEP activities related to strengthening, flexibility, endurance, ROM of core, proximal hip and LE for functional self-care, mobility, lifting and ambulation/stair navigation   [] (01822)Reviewed/Progressed HEP activities related to improving balance, coordination, kinesthetic sense, posture, motor skill, proprioception of core, proximal hip and LE for self care, mobility, lifting, and ambulation/stair navigation      Manual Treatments:  PROM / STM / Oscillations-Mobs:  G-I, II, III, IV (PA's, Inf., Post.)  [] (09489) Provided manual therapy to mobilize LE, proximal hip and/or LS spine soft tissue/joints for the purpose of modulating pain, promoting relaxation,  increasing ROM, reducing/eliminating soft tissue swelling/inflammation/restriction, improving soft tissue extensibility and allowing for proper ROM for normal function with self care, mobility, lifting and ambulation. Modalities:  CP x10'    Charges:  Timed Code Treatment Minutes: 20   Total Treatment Minutes: 20     [] EVAL (LOW) 23778 (typically 20 minutes face-to-face)  [] EVAL (MOD) 50924 (typically 30 minutes face-to-face)  [] EVAL (HIGH) 27610 (typically 45 minutes face-to-face)  [] RE-EVAL     [] AN(26395) x 2    [] IONTO  [] NMR (25953) x     [] VASO  [] Manual (27421) x      [] Other:  [x] TA x  1    [] Mech Traction (19422)  [] ES(attended) (05650)      [] ES (un) (01126):     GOALS:   Short Term Goals: To be achieved in: 1 visit  1. Independent in HEP and progression per patient tolerance, in order to prevent re-injury. [] Progressing: [] Met: [] Not Met: [] Adjusted       Progression Towards Functional goals:  [] Patient is progressing as expected towards functional goals listed. [] Progression is slowed due to complexities listed. [] Progression has been slowed due to co-morbidities. [x] Plan just implemented, too soon to assess goals progression  [] Other:     ASSESSMENT:  Patient returned with referral from [facility] HILL M.D. who requested patient be seen for one additional pre-operative visit with focus on education. Patient was exercising significant pain during session. Session limited to education on use of axillary crutches during gait, post-surgical precautions, WB status, don/doff of supportive brace, expectations post-operatively, and HEP. Patient voiced understanding with all education. Patient is undergoing surgical intervention on 5/28/21 and will return to PT afterwards.      Treatment/Activity Tolerance:  [] Patient tolerated treatment well [] Patient limited by fatique  [x] Patient limited by pain  [] Patient limited by other medical complications  [] Other:     Prognosis: [x] Good [] Fair  [] Poor    Patient Requires Follow-up: [x] Yes  [] No    PLAN: Patient will return to PT following surgical intervention. [] Continue per plan of care [x] Alter current plan (see comments)  [] Plan of care initiated [] Hold pending MD visit [] Discharge      Electronically signed by: Anisa Chase DPT       Note: If patient does not return for scheduled/ recommended follow up visits, this note will serve as a discharge from care along with most recent update on progress.

## 2021-05-26 NOTE — LETTER
[address]  [address] [facility] [place] [facility] [address]  Phone: [phone]  Fax: [phone]    Sarah Cordero MD         May 26, 2021     Patient: Rafia Trujillo   YOB: 1965   Date of Visit: 5/26/2021       To Whom It May Concern: It is my medical opinion that Barbara Travis requires a disability parking placard for the following reasons:  She has limited walking ability due to an orthopedic condition. Duration of need: 3 months    If you have any questions or concerns, please don't hesitate to call.     Sincerely,          Sarah Cordero MD

## 2021-05-27 ENCOUNTER — ANESTHESIA EVENT (OUTPATIENT)
Dept: OPERATING ROOM | Age: 56
End: 2021-05-27
Payer: COMMERCIAL

## 2021-05-27 ENCOUNTER — TELEPHONE (OUTPATIENT)
Dept: ORTHOPEDIC SURGERY | Age: 56
End: 2021-05-27

## 2021-05-27 NOTE — TELEPHONE ENCOUNTER
5/27/21 DME    NO PRECERT REQUIRED   DEDUCTIBLE: $1,000 / $1,000 MET                             $2,000 / $1,000 MET   CO INSURANCE: 80% AFTER DED.    OOP: $3,000 / $1,375.98 MET              $6,000 / $1,375.98 MET   PER [facility] ONLINE

## 2021-05-28 ENCOUNTER — APPOINTMENT (OUTPATIENT)
Dept: GENERAL RADIOLOGY | Age: 56
End: 2021-05-28
Attending: ORTHOPAEDIC SURGERY
Payer: COMMERCIAL

## 2021-05-28 ENCOUNTER — HOSPITAL ENCOUNTER (OUTPATIENT)
Age: 56
Setting detail: OUTPATIENT SURGERY
Discharge: HOME | End: 2021-05-28
Attending: ORTHOPAEDIC SURGERY | Admitting: ORTHOPAEDIC SURGERY
Payer: COMMERCIAL

## 2021-05-28 ENCOUNTER — ANESTHESIA (OUTPATIENT)
Dept: OPERATING ROOM | Age: 56
End: 2021-05-28
Payer: COMMERCIAL

## 2021-05-28 VITALS
WEIGHT: 180 LBS | HEIGHT: 61 IN | OXYGEN SATURATION: 93 % | HEART RATE: 88 BPM | SYSTOLIC BLOOD PRESSURE: 134 MMHG | RESPIRATION RATE: 16 BRPM | BODY MASS INDEX: 33.99 KG/M2 | TEMPERATURE: 97.4 F | DIASTOLIC BLOOD PRESSURE: 89 MMHG

## 2021-05-28 VITALS — OXYGEN SATURATION: 91 % | SYSTOLIC BLOOD PRESSURE: 164 MMHG | TEMPERATURE: 96.6 F | DIASTOLIC BLOOD PRESSURE: 109 MMHG

## 2021-05-28 PROCEDURE — 6360000002 HC RX W HCPCS: Performed by: ORTHOPAEDIC SURGERY

## 2021-05-28 PROCEDURE — 7100000000 HC PACU RECOVERY - FIRST 15 MIN: Performed by: ORTHOPAEDIC SURGERY

## 2021-05-28 PROCEDURE — 6360000002 HC RX W HCPCS: Performed by: ANESTHESIOLOGY

## 2021-05-28 PROCEDURE — 6360000002 HC RX W HCPCS: Performed by: NURSE ANESTHETIST, CERTIFIED REGISTERED

## 2021-05-28 PROCEDURE — 2580000003 HC RX 258: Performed by: ORTHOPAEDIC SURGERY

## 2021-05-28 PROCEDURE — 2500000003 HC RX 250 WO HCPCS: Performed by: ORTHOPAEDIC SURGERY

## 2021-05-28 PROCEDURE — C1788 PORT, INDWELLING, IMP: HCPCS | Performed by: ORTHOPAEDIC SURGERY

## 2021-05-28 PROCEDURE — 7100000001 HC PACU RECOVERY - ADDTL 15 MIN: Performed by: ORTHOPAEDIC SURGERY

## 2021-05-28 PROCEDURE — 2580000003 HC RX 258: Performed by: ANESTHESIOLOGY

## 2021-05-28 PROCEDURE — 3209999900 FLUORO FOR SURGICAL PROCEDURES

## 2021-05-28 PROCEDURE — 3700000001 HC ADD 15 MINUTES (ANESTHESIA): Performed by: ORTHOPAEDIC SURGERY

## 2021-05-28 PROCEDURE — 2709999900 HC NON-CHARGEABLE SUPPLY: Performed by: ORTHOPAEDIC SURGERY

## 2021-05-28 PROCEDURE — 7100000010 HC PHASE II RECOVERY - FIRST 15 MIN: Performed by: ORTHOPAEDIC SURGERY

## 2021-05-28 PROCEDURE — 2500000003 HC RX 250 WO HCPCS: Performed by: ANESTHESIOLOGY

## 2021-05-28 PROCEDURE — 3600000004 HC SURGERY LEVEL 4 BASE: Performed by: ORTHOPAEDIC SURGERY

## 2021-05-28 PROCEDURE — 76942 ECHO GUIDE FOR BIOPSY: CPT | Performed by: ANESTHESIOLOGY

## 2021-05-28 PROCEDURE — 64447 NJX AA&/STRD FEMORAL NRV IMG: CPT

## 2021-05-28 PROCEDURE — 2720000010 HC SURG SUPPLY STERILE: Performed by: ORTHOPAEDIC SURGERY

## 2021-05-28 PROCEDURE — 7100000011 HC PHASE II RECOVERY - ADDTL 15 MIN: Performed by: ORTHOPAEDIC SURGERY

## 2021-05-28 PROCEDURE — 73501 X-RAY EXAM HIP UNI 1 VIEW: CPT

## 2021-05-28 PROCEDURE — 6370000000 HC RX 637 (ALT 250 FOR IP): Performed by: ANESTHESIOLOGY

## 2021-05-28 PROCEDURE — 3700000000 HC ANESTHESIA ATTENDED CARE: Performed by: ORTHOPAEDIC SURGERY

## 2021-05-28 PROCEDURE — C1713 ANCHOR/SCREW BN/BN,TIS/BN: HCPCS | Performed by: ORTHOPAEDIC SURGERY

## 2021-05-28 PROCEDURE — 3600000014 HC SURGERY LEVEL 4 ADDTL 15MIN: Performed by: ORTHOPAEDIC SURGERY

## 2021-05-28 PROCEDURE — 2500000003 HC RX 250 WO HCPCS: Performed by: NURSE ANESTHETIST, CERTIFIED REGISTERED

## 2021-05-28 DEVICE — 1.8MM Q-FIX ALL SUTURE ANCHOR
Type: IMPLANTABLE DEVICE | Site: HIP | Status: FUNCTIONAL
Brand: Q-FIX

## 2021-05-28 DEVICE — NANOTACK SUTURE ANCHOR 1.4MM WITH FLEX INSERTER
Type: IMPLANTABLE DEVICE | Site: HIP | Status: FUNCTIONAL
Brand: NANOTACK

## 2021-05-28 RX ORDER — KETOROLAC TROMETHAMINE 30 MG/ML
INJECTION, SOLUTION INTRAMUSCULAR; INTRAVENOUS PRN
Status: DISCONTINUED | OUTPATIENT
Start: 2021-05-28 | End: 2021-05-28 | Stop reason: SDUPTHER

## 2021-05-28 RX ORDER — DIPHENHYDRAMINE HYDROCHLORIDE 50 MG/ML
12.5 INJECTION INTRAMUSCULAR; INTRAVENOUS
Status: DISCONTINUED | OUTPATIENT
Start: 2021-05-28 | End: 2021-05-28 | Stop reason: HOSPADM

## 2021-05-28 RX ORDER — MIDAZOLAM HYDROCHLORIDE 1 MG/ML
INJECTION INTRAMUSCULAR; INTRAVENOUS
Status: COMPLETED
Start: 2021-05-28 | End: 2021-05-28

## 2021-05-28 RX ORDER — DEXAMETHASONE SODIUM PHOSPHATE 4 MG/ML
INJECTION, SOLUTION INTRA-ARTICULAR; INTRALESIONAL; INTRAMUSCULAR; INTRAVENOUS; SOFT TISSUE PRN
Status: DISCONTINUED | OUTPATIENT
Start: 2021-05-28 | End: 2021-05-28 | Stop reason: SDUPTHER

## 2021-05-28 RX ORDER — FENTANYL CITRATE 50 UG/ML
25 INJECTION, SOLUTION INTRAMUSCULAR; INTRAVENOUS EVERY 5 MIN PRN
Status: DISCONTINUED | OUTPATIENT
Start: 2021-05-28 | End: 2021-05-28 | Stop reason: HOSPADM

## 2021-05-28 RX ORDER — LABETALOL HYDROCHLORIDE 5 MG/ML
10 INJECTION, SOLUTION INTRAVENOUS EVERY 10 MIN PRN
Status: DISCONTINUED | OUTPATIENT
Start: 2021-05-28 | End: 2021-05-28 | Stop reason: HOSPADM

## 2021-05-28 RX ORDER — PROMETHAZINE HYDROCHLORIDE 25 MG/ML
6.25 INJECTION, SOLUTION INTRAMUSCULAR; INTRAVENOUS
Status: DISCONTINUED | OUTPATIENT
Start: 2021-05-28 | End: 2021-05-28 | Stop reason: HOSPADM

## 2021-05-28 RX ORDER — PROPOFOL 10 MG/ML
INJECTION, EMULSION INTRAVENOUS CONTINUOUS PRN
Status: DISCONTINUED | OUTPATIENT
Start: 2021-05-28 | End: 2021-05-28 | Stop reason: SDUPTHER

## 2021-05-28 RX ORDER — DEXAMETHASONE SODIUM PHOSPHATE 4 MG/ML
INJECTION, SOLUTION INTRA-ARTICULAR; INTRALESIONAL; INTRAMUSCULAR; INTRAVENOUS; SOFT TISSUE
Status: COMPLETED
Start: 2021-05-28 | End: 2021-05-28

## 2021-05-28 RX ORDER — PROPOFOL 10 MG/ML
INJECTION, EMULSION INTRAVENOUS PRN
Status: DISCONTINUED | OUTPATIENT
Start: 2021-05-28 | End: 2021-05-28 | Stop reason: SDUPTHER

## 2021-05-28 RX ORDER — HYDRALAZINE HYDROCHLORIDE 20 MG/ML
5 INJECTION INTRAMUSCULAR; INTRAVENOUS EVERY 10 MIN PRN
Status: DISCONTINUED | OUTPATIENT
Start: 2021-05-28 | End: 2021-05-28 | Stop reason: HOSPADM

## 2021-05-28 RX ORDER — ROCURONIUM BROMIDE 10 MG/ML
INJECTION, SOLUTION INTRAVENOUS PRN
Status: DISCONTINUED | OUTPATIENT
Start: 2021-05-28 | End: 2021-05-28 | Stop reason: SDUPTHER

## 2021-05-28 RX ORDER — FENTANYL CITRATE 50 UG/ML
INJECTION, SOLUTION INTRAMUSCULAR; INTRAVENOUS
Status: COMPLETED
Start: 2021-05-28 | End: 2021-05-28

## 2021-05-28 RX ORDER — ONDANSETRON 2 MG/ML
INJECTION INTRAMUSCULAR; INTRAVENOUS PRN
Status: DISCONTINUED | OUTPATIENT
Start: 2021-05-28 | End: 2021-05-28 | Stop reason: SDUPTHER

## 2021-05-28 RX ORDER — SCOLOPAMINE TRANSDERMAL SYSTEM 1 MG/1
1 PATCH, EXTENDED RELEASE TRANSDERMAL
Status: DISCONTINUED | OUTPATIENT
Start: 2021-05-28 | End: 2021-05-28 | Stop reason: HOSPADM

## 2021-05-28 RX ORDER — MIDAZOLAM HYDROCHLORIDE 1 MG/ML
INJECTION INTRAMUSCULAR; INTRAVENOUS
Status: DISCONTINUED
Start: 2021-05-28 | End: 2021-05-28 | Stop reason: HOSPADM

## 2021-05-28 RX ORDER — LIDOCAINE HYDROCHLORIDE 10 MG/ML
INJECTION, SOLUTION INFILTRATION; PERINEURAL
Status: DISCONTINUED
Start: 2021-05-28 | End: 2021-05-28 | Stop reason: HOSPADM

## 2021-05-28 RX ORDER — SODIUM CHLORIDE 0.9 % (FLUSH) 0.9 %
10 SYRINGE (ML) INJECTION EVERY 12 HOURS SCHEDULED
Status: DISCONTINUED | OUTPATIENT
Start: 2021-05-28 | End: 2021-05-28 | Stop reason: HOSPADM

## 2021-05-28 RX ORDER — HYDROMORPHONE HCL 110MG/55ML
PATIENT CONTROLLED ANALGESIA SYRINGE INTRAVENOUS PRN
Status: DISCONTINUED | OUTPATIENT
Start: 2021-05-28 | End: 2021-05-28 | Stop reason: SDUPTHER

## 2021-05-28 RX ORDER — ONDANSETRON 2 MG/ML
4 INJECTION INTRAMUSCULAR; INTRAVENOUS
Status: DISCONTINUED | OUTPATIENT
Start: 2021-05-28 | End: 2021-05-28 | Stop reason: HOSPADM

## 2021-05-28 RX ORDER — SODIUM CHLORIDE, SODIUM LACTATE, POTASSIUM CHLORIDE, CALCIUM CHLORIDE 600; 310; 30; 20 MG/100ML; MG/100ML; MG/100ML; MG/100ML
INJECTION, SOLUTION INTRAVENOUS CONTINUOUS
Status: DISCONTINUED | OUTPATIENT
Start: 2021-05-28 | End: 2021-05-28 | Stop reason: HOSPADM

## 2021-05-28 RX ORDER — BUPIVACAINE HYDROCHLORIDE 5 MG/ML
INJECTION, SOLUTION EPIDURAL; INTRACAUDAL
Status: COMPLETED | OUTPATIENT
Start: 2021-05-28 | End: 2021-05-28

## 2021-05-28 RX ORDER — BUPIVACAINE HYDROCHLORIDE 5 MG/ML
INJECTION, SOLUTION EPIDURAL; INTRACAUDAL
Status: COMPLETED
Start: 2021-05-28 | End: 2021-05-28

## 2021-05-28 RX ORDER — MIDAZOLAM HYDROCHLORIDE 1 MG/ML
INJECTION INTRAMUSCULAR; INTRAVENOUS PRN
Status: DISCONTINUED | OUTPATIENT
Start: 2021-05-28 | End: 2021-05-28 | Stop reason: SDUPTHER

## 2021-05-28 RX ORDER — CEFAZOLIN SODIUM 2 G/50ML
2000 SOLUTION INTRAVENOUS ONCE
Status: COMPLETED | OUTPATIENT
Start: 2021-05-28 | End: 2021-05-28

## 2021-05-28 RX ORDER — OXYCODONE HYDROCHLORIDE AND ACETAMINOPHEN 5; 325 MG/1; MG/1
1 TABLET ORAL ONCE
Status: COMPLETED | OUTPATIENT
Start: 2021-05-28 | End: 2021-05-28

## 2021-05-28 RX ORDER — FENTANYL CITRATE 50 UG/ML
INJECTION, SOLUTION INTRAMUSCULAR; INTRAVENOUS PRN
Status: DISCONTINUED | OUTPATIENT
Start: 2021-05-28 | End: 2021-05-28 | Stop reason: SDUPTHER

## 2021-05-28 RX ORDER — SODIUM CHLORIDE 9 MG/ML
25 INJECTION, SOLUTION INTRAVENOUS PRN
Status: DISCONTINUED | OUTPATIENT
Start: 2021-05-28 | End: 2021-05-28 | Stop reason: HOSPADM

## 2021-05-28 RX ORDER — LIDOCAINE HCL/PF 100 MG/5ML
SYRINGE (ML) INJECTION PRN
Status: DISCONTINUED | OUTPATIENT
Start: 2021-05-28 | End: 2021-05-28 | Stop reason: SDUPTHER

## 2021-05-28 RX ORDER — SODIUM CHLORIDE 0.9 % (FLUSH) 0.9 %
10 SYRINGE (ML) INJECTION PRN
Status: DISCONTINUED | OUTPATIENT
Start: 2021-05-28 | End: 2021-05-28 | Stop reason: HOSPADM

## 2021-05-28 RX ADMIN — BUPIVACAINE HYDROCHLORIDE 30 ML: 5 INJECTION, SOLUTION EPIDURAL; INTRACAUDAL; PERINEURAL at 07:45

## 2021-05-28 RX ADMIN — HYDROMORPHONE HYDROCHLORIDE 0.5 MG: 1 INJECTION, SOLUTION INTRAMUSCULAR; INTRAVENOUS; SUBCUTANEOUS at 14:25

## 2021-05-28 RX ADMIN — HYDROMORPHONE HYDROCHLORIDE 0.5 MG: 1 INJECTION, SOLUTION INTRAMUSCULAR; INTRAVENOUS; SUBCUTANEOUS at 12:10

## 2021-05-28 RX ADMIN — SODIUM CHLORIDE, POTASSIUM CHLORIDE, SODIUM LACTATE AND CALCIUM CHLORIDE: 600; 310; 30; 20 INJECTION, SOLUTION INTRAVENOUS at 07:14

## 2021-05-28 RX ADMIN — CEFAZOLIN SODIUM 200 MG: 2 SOLUTION INTRAVENOUS at 08:05

## 2021-05-28 RX ADMIN — ROCURONIUM BROMIDE 30 MG: 10 INJECTION INTRAVENOUS at 08:58

## 2021-05-28 RX ADMIN — MIDAZOLAM HYDROCHLORIDE 4 MG: 2 INJECTION, SOLUTION INTRAMUSCULAR; INTRAVENOUS at 07:40

## 2021-05-28 RX ADMIN — FAMOTIDINE 20 MG: 10 INJECTION, SOLUTION INTRAVENOUS at 07:56

## 2021-05-28 RX ADMIN — HYDROMORPHONE HYDROCHLORIDE 0.5 MG: 1 INJECTION, SOLUTION INTRAMUSCULAR; INTRAVENOUS; SUBCUTANEOUS at 11:55

## 2021-05-28 RX ADMIN — Medication 60 MG: at 08:02

## 2021-05-28 RX ADMIN — PROPOFOL 125 MCG/KG/MIN: 10 INJECTION, EMULSION INTRAVENOUS at 08:06

## 2021-05-28 RX ADMIN — HYDROMORPHONE HYDROCHLORIDE 1 MG: 2 INJECTION, SOLUTION INTRAMUSCULAR; INTRAVENOUS; SUBCUTANEOUS at 08:17

## 2021-05-28 RX ADMIN — OXYCODONE HYDROCHLORIDE AND ACETAMINOPHEN 1 TABLET: 5; 325 TABLET ORAL at 13:05

## 2021-05-28 RX ADMIN — MIDAZOLAM HYDROCHLORIDE 2 MG: 2 INJECTION, SOLUTION INTRAMUSCULAR; INTRAVENOUS at 08:01

## 2021-05-28 RX ADMIN — KETOROLAC TROMETHAMINE 30 MG: 30 INJECTION, SOLUTION INTRAMUSCULAR at 10:34

## 2021-05-28 RX ADMIN — SODIUM CHLORIDE, POTASSIUM CHLORIDE, SODIUM LACTATE AND CALCIUM CHLORIDE: 600; 310; 30; 20 INJECTION, SOLUTION INTRAVENOUS at 08:47

## 2021-05-28 RX ADMIN — LABETALOL HYDROCHLORIDE 10 MG: 5 INJECTION, SOLUTION INTRAVENOUS at 12:05

## 2021-05-28 RX ADMIN — HYDROMORPHONE HYDROCHLORIDE 0.5 MG: 2 INJECTION, SOLUTION INTRAMUSCULAR; INTRAVENOUS; SUBCUTANEOUS at 08:58

## 2021-05-28 RX ADMIN — SUGAMMADEX 200 MG: 100 INJECTION, SOLUTION INTRAVENOUS at 10:23

## 2021-05-28 RX ADMIN — PROPOFOL 200 MG: 10 INJECTION, EMULSION INTRAVENOUS at 08:02

## 2021-05-28 RX ADMIN — DEXAMETHASONE SODIUM PHOSPHATE 8 MG: 4 INJECTION, SOLUTION INTRAMUSCULAR; INTRAVENOUS at 08:02

## 2021-05-28 RX ADMIN — ONDANSETRON 4 MG: 2 INJECTION INTRAMUSCULAR; INTRAVENOUS at 08:02

## 2021-05-28 RX ADMIN — DEXAMETHASONE SODIUM PHOSPHATE 4 MG: 4 INJECTION, SOLUTION INTRAMUSCULAR; INTRAVENOUS at 07:45

## 2021-05-28 RX ADMIN — ROCURONIUM BROMIDE 70 MG: 10 INJECTION INTRAVENOUS at 08:01

## 2021-05-28 RX ADMIN — HYDROMORPHONE HYDROCHLORIDE 0.5 MG: 2 INJECTION, SOLUTION INTRAMUSCULAR; INTRAVENOUS; SUBCUTANEOUS at 10:03

## 2021-05-28 RX ADMIN — TRANEXAMIC ACID 1000 MG: 1 INJECTION, SOLUTION INTRAVENOUS at 08:44

## 2021-05-28 RX ADMIN — FENTANYL CITRATE 100 MCG: 50 INJECTION, SOLUTION INTRAMUSCULAR; INTRAVENOUS at 07:40

## 2021-05-28 ASSESSMENT — PULMONARY FUNCTION TESTS
PIF_VALUE: 28
PIF_VALUE: 31
PIF_VALUE: 28
PIF_VALUE: 29
PIF_VALUE: 28
PIF_VALUE: 27
PIF_VALUE: 28
PIF_VALUE: 29
PIF_VALUE: 29
PIF_VALUE: 31
PIF_VALUE: 31
PIF_VALUE: 29
PIF_VALUE: 27
PIF_VALUE: 27
PIF_VALUE: 28
PIF_VALUE: 30
PIF_VALUE: 27
PIF_VALUE: 31
PIF_VALUE: 30
PIF_VALUE: 2
PIF_VALUE: 29
PIF_VALUE: 26
PIF_VALUE: 29
PIF_VALUE: 26
PIF_VALUE: 29
PIF_VALUE: 4
PIF_VALUE: 27
PIF_VALUE: 30
PIF_VALUE: 27
PIF_VALUE: 28
PIF_VALUE: 30
PIF_VALUE: 4
PIF_VALUE: 28
PIF_VALUE: 27
PIF_VALUE: 29
PIF_VALUE: 28
PIF_VALUE: 21
PIF_VALUE: 29
PIF_VALUE: 30
PIF_VALUE: 28
PIF_VALUE: 1
PIF_VALUE: 29
PIF_VALUE: 26
PIF_VALUE: 29
PIF_VALUE: 27
PIF_VALUE: 5
PIF_VALUE: 28
PIF_VALUE: 0
PIF_VALUE: 31
PIF_VALUE: 30
PIF_VALUE: 31
PIF_VALUE: 2
PIF_VALUE: 31
PIF_VALUE: 28
PIF_VALUE: 2
PIF_VALUE: 27
PIF_VALUE: 29
PIF_VALUE: 28
PIF_VALUE: 31
PIF_VALUE: 27
PIF_VALUE: 31
PIF_VALUE: 27
PIF_VALUE: 28
PIF_VALUE: 26
PIF_VALUE: 31
PIF_VALUE: 29
PIF_VALUE: 4
PIF_VALUE: 28
PIF_VALUE: 26
PIF_VALUE: 30
PIF_VALUE: 26
PIF_VALUE: 0
PIF_VALUE: 3
PIF_VALUE: 27
PIF_VALUE: 31
PIF_VALUE: 28
PIF_VALUE: 4
PIF_VALUE: 28
PIF_VALUE: 31
PIF_VALUE: 28
PIF_VALUE: 31
PIF_VALUE: 30

## 2021-05-28 ASSESSMENT — PAIN DESCRIPTION - ORIENTATION
ORIENTATION: RIGHT

## 2021-05-28 ASSESSMENT — PAIN DESCRIPTION - LOCATION
LOCATION: HIP

## 2021-05-28 ASSESSMENT — PAIN DESCRIPTION - PAIN TYPE
TYPE: SURGICAL PAIN

## 2021-05-28 ASSESSMENT — PAIN DESCRIPTION - ONSET
ONSET: ON-GOING

## 2021-05-28 ASSESSMENT — PAIN DESCRIPTION - DESCRIPTORS
DESCRIPTORS: ACHING
DESCRIPTORS: DISCOMFORT
DESCRIPTORS: ACHING;DISCOMFORT;DULL
DESCRIPTORS: DISCOMFORT
DESCRIPTORS: DISCOMFORT

## 2021-05-28 ASSESSMENT — PAIN SCALES - GENERAL
PAINLEVEL_OUTOF10: 0
PAINLEVEL_OUTOF10: 8
PAINLEVEL_OUTOF10: 8
PAINLEVEL_OUTOF10: 5
PAINLEVEL_OUTOF10: 5
PAINLEVEL_OUTOF10: 7
PAINLEVEL_OUTOF10: 7
PAINLEVEL_OUTOF10: 4
PAINLEVEL_OUTOF10: 5

## 2021-05-28 ASSESSMENT — PAIN DESCRIPTION - FREQUENCY
FREQUENCY: CONTINUOUS

## 2021-05-28 ASSESSMENT — PAIN DESCRIPTION - PROGRESSION
CLINICAL_PROGRESSION: NOT CHANGED
CLINICAL_PROGRESSION: GRADUALLY WORSENING
CLINICAL_PROGRESSION: NOT CHANGED
CLINICAL_PROGRESSION: NOT CHANGED

## 2021-05-28 ASSESSMENT — PAIN - FUNCTIONAL ASSESSMENT
PAIN_FUNCTIONAL_ASSESSMENT: 0-10
PAIN_FUNCTIONAL_ASSESSMENT: ACTIVITIES ARE NOT PREVENTED

## 2021-05-28 NOTE — OP NOTE
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 [M25.851, M25.751]    Post-Op Diagnosis: Pincer Femoroacetabular impingement, Anterior and Superior Labral Tear       Procedure(s):  RIGHT HIP ARTHROSCOPY & SYNOVECTOMY  CHONDROPLASTY and REMOVAL OF LOOSE CARTILAGE BODY (81533 and 25401), ACETABULOPLASTY (96683), LABRAL REPAIR (67276), PSOAS LENGTHENING (25038, comparable to 83913)    Surgeon(s):  Jaren Pitt MD    Assistant:   Surgical Assistant: Sagar Gilman    Anesthesia: General    Estimated Blood Loss (mL): less than 50     Complications: None    Specimens:   * No specimens in log *    Implants:  * No implants in log *      Drains: * No LDAs found *      Detailed Description of Procedure:   Findings: Normal sized labrum with full thickness tearing between 3 and 11 O'Clock, injection of 3'Oclock labrum with adjacent capsulitis. Prominent pincer at 12 O'clock. No Head-Neck junction prominence. 3mm x 2mm lose cartilage fragment from the femoral head. Clinical Note:  Triny Luciano is a 58-year-old who has been having anterior hip pain on the right side for the past  6 months. The patient was ultimately diagnosed with pincer femoral acetabular impingement and had an MRI proven labral tear of her right hip. The patient had failed extensive nonoperative treatment including physical therapy, activity modification and, anti-inflammatory medicine. Despite that symptoms have persisted. The patient had reproducible pain in the hip on physical exam with deep flexion and flexion internal rotation, imaging that confirmed a labral tear due to FORTINO, and relief with an intra-articular injection. This allowed Triny Luciano to be a candidate for surgery in the form of right hip arthroscopy labral repair, and acetabuloplasty.  Risks, benefits, advantages, disadvantages and potential the hip was dynamically rotated showing re-demonstration of an adequate suction seal. The francesco-portal/inter-portal capsulotomy was not re-approximated given: Tonnis 1 OA changes, arthroscopic evidence of chondromalacia at the chondrolabral junction at the above zones, and a wide zone of labral tear repair sutures in which further capsular sutures may lead to suture adhesion causation. There was no concern for soft tissue laxity or any hip instability in this patient. The arthroscope was then withdrawn and portal sites were irrigated and closed with 3-0 nylon and water-proof dressings were applied. In addition, a bulky compressive dressing was then applied in addition. No complications were encountered. Blood loss was minimal.    Patient was then awoken from surgery and transferred onto the stretcher. TREVER hose stockings were reapplied. Foot was warm and well-perfused after surgery and coming out of the traction boot. An Ossur Rebound hip brace locked at 90deg of hip flexion, 0 deg abduction, and 0 deg extension, was applied to the right hip. Patient was taken to the recovery room in stable condition. All needle and sponge counts matched the initial count per circulating and scrub personnel x2 prior to terminating this case. Sincerely,    Eloy Holland MD [address] [place] [address] [address] [place], [address]  Email: [email or web address]  Office: [phone]    05/28/21  11:29 AM      Electronically signed by Eloy Holland MD on 5/28/2021 at 11:08 AM

## 2021-05-28 NOTE — ANESTHESIA POSTPROCEDURE EVALUATION
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        Anesthesia Post Evaluation    Patient location during evaluation: bedside  Patient participation: complete - patient participated  Level of consciousness: awake and alert  Airway patency: patent  Nausea & Vomiting: no nausea and no vomiting  Complications: no  Respiratory status: acceptable  Hydration status: stable

## 2021-05-28 NOTE — PROGRESS NOTES
Ambulatory Surgery/Procedure Discharge Note    Vitals:    05/28/21 1345   BP: 134/89   Pulse: 88   Resp:    Temp:    SpO2:      Pt met criteria for discharge per Evelio score. No intake/output data recorded. Restroom use offered before discharge. Yes    Pain assessment:  present - adequately treated  Pain Level: 4      Pt alert and oriented x4. IV removed. Denies N/V or pain. Dressing R hip CDI. Brace on R hip. Voided prior to discharge. Discharge instructions given to pt and friend Gemma with pt permission. Pt and friend verbalized understanding of all instructions. Left with all belongings and discharge instructions. Pt and friend report they already have her post op medications at home. Patient discharged to home/self care.  Patient discharged via wheel chair by transporter to waiting family/S.O.       5/28/2021 3:27 PM

## 2021-05-28 NOTE — ANESTHESIA PRE PROCEDURE
type headache G44.209    Pain in thoracic spine M54.6    Reflux esophagitis K21.00    DDD (degenerative disc disease), lumbar M51.36    ADHD (attention deficit hyperactivity disorder) F90.9    Cyst of pancreas K86.2    Hiatal hernia K44.9    Diverticulosis K57.90    DDD (degenerative disc disease), cervical M50.30    Depression with anxiety F41.8       Past Medical History:        Diagnosis Date    Acid reflux     Acquired cyst of kidney     ADHD (attention deficit hyperactivity disorder) 10/1/2013    DDD (degenerative disc disease), cervical 2021    DDD (degenerative disc disease), lumbar 2013    Depression with anxiety 3/10/2021    Depressive disorder, not elsewhere classified 2010    Hiatal hernia 3/7/2016    History of loop recorder placed 2 years ago    Irritable bowel syndrome 2010    Migraine, unspecified, without mention of intractable migraine without mention of status migrainosus 2010    Palpitations 2010    Pancreatitis     Pneumonia childhood    Reflux esophagitis 2010       Past Surgical History:        Procedure Laterality Date    ABLATION OF DYSRHYTHMIC FOCUS      APPENDECTOMY       SECTION      COLONOSCOPY  2018    COLONOSCOPY WITH BIOPSY performed by Mike Jimenez MD at [address] [place] [address]      laparoscopic    OVARY REMOVAL      bilateral    VA COLONOSCOPY FLX DX W/COLLJ SPEC WHEN PFRMD N/A 2018    COLONOSCOPY WITH MAC performed by Mike Jimenez MD at [facility] ENDOSCOPY       Social History:    Social History     Tobacco Use    Smoking status: Never Smoker    Smokeless tobacco: Never Used   Substance Use Topics    Alcohol use:  Yes     Alcohol/week: 1.0 standard drinks     Types: 1 Glasses of wine per week     Comment: once a week                                Counseling given: Not Answered      Vital Signs (Current):   Vitals: 05/28/21 0647   BP: (!) 154/94   Pulse: 90   Resp: 18   Temp: 98.7 °F (37.1 °C)   TempSrc: Oral   SpO2: 96%   Weight: 180 lb (81.6 kg)   Height: 5' 1\" (1.549 m)                                              BP Readings from Last 3 Encounters:   05/28/21 (!) 154/94   05/13/21 112/70   03/10/21 122/76       NPO Status: Time of last liquid consumption: 1800                        Time of last solid consumption: 1600                        Date of last liquid consumption: 05/27/21                        Date of last solid food consumption: 05/27/21    BMI:   Wt Readings from Last 3 Encounters:   05/28/21 180 lb (81.6 kg)   05/26/21 178 lb (80.7 kg)   05/13/21 178 lb 9.6 oz (81 kg)     Body mass index is 34.01 kg/m². CBC:   Lab Results   Component Value Date    WBC 9.8 09/08/2020    RBC 4.68 09/08/2020    HGB 14.0 09/08/2020    HCT 42.1 09/08/2020    MCV 89.9 09/08/2020    RDW 13.9 09/08/2020     09/08/2020       CMP:   Lab Results   Component Value Date     11/13/2020    K 4.5 11/13/2020    K 3.8 12/06/2019     11/13/2020    CO2 26 11/13/2020    BUN 18 11/13/2020    CREATININE 0.6 11/13/2020    GFRAA >60 11/13/2020    GFRAA >60 03/29/2013    AGRATIO 1.8 09/08/2020    LABGLOM >60 11/13/2020    GLUCOSE 100 11/13/2020    PROT 7.4 09/08/2020    CALCIUM 9.4 11/13/2020    BILITOT <0.2 09/08/2020    ALKPHOS 88 09/08/2020    AST 16 09/08/2020    ALT 18 09/08/2020       POC Tests: No results for input(s): POCGLU, POCNA, POCK, POCCL, POCBUN, POCHEMO, POCHCT in the last 72 hours.     Coags:   Lab Results   Component Value Date    PROTIME 11.0 08/08/2018    INR 0.96 08/08/2018       HCG (If Applicable): No results found for: PREGTESTUR, PREGSERUM, HCG, HCGQUANT     ABGs: No results found for: PHART, PO2ART, LJJ6DQU, LVY6BQN, BEART, Q9DRHCYQ     Type & Screen (If Applicable):  No results found for: LABABO, LABRH    Drug/Infectious Status (If Applicable):  No results found for: HIV, HEPCAB    COVID-19 Screening

## 2021-05-28 NOTE — PROGRESS NOTES
Patient admitted to PACU #11 from OR per stretcher at 1042 s/p RIGHT HIP ARTHROSCOPY, SYNOVECTOMY, ACETABULOPLASTY, LABRAL REPAIR, PSOAS LENGTHENING (Right). Report received at bedside in PACU per CRNA. Patient was reported to be hemodynamically stable in OR with no complications along with patient receiving a PENG nerve block per Dr. Elizabeth Quispe. Patient connected to PACU monitoring equipment. IVF's infusing with site unremarkable. Patient arrived to PACU responsive but not fully wakeful from anesthesia but with respirations easy, even and regular with no pain noted or verbalized. Right anterior hip surgical dressing with DSD, ABD and medipore tape remains C,D,I with a hip brace in place. Ice pack applied. No further changes. Will continue to monitor.

## 2021-05-28 NOTE — H&P
[place]    [id number]    [facility] Same Day Surgery Update H & P  Department of General Surgery   Surgical Service   Pre-operative History and Physical  Last H & P within the last 30 days. 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 The symptoms have been recalcitrant to conservative treatment and the patient presents today for the above procedure. Covid 19:  Patient denies fever, chills, cough or known exposure to Covid-19.       Past Medical History:        Diagnosis Date    Acid reflux     Acquired cyst of kidney     ADHD (attention deficit hyperactivity disorder) 10/1/2013    DDD (degenerative disc disease), cervical 2021    DDD (degenerative disc disease), lumbar 2013    Depression with anxiety 3/10/2021    Depressive disorder, not elsewhere classified 2010    Hiatal hernia 3/7/2016    History of loop recorder placed 2 years ago    Irritable bowel syndrome 2010    Migraine, unspecified, without mention of intractable migraine without mention of status migrainosus 2010    Palpitations 2010    Pancreatitis     Pneumonia childhood    Reflux esophagitis 2010     Past Surgical History:        Procedure Laterality Date    ABLATION OF DYSRHYTHMIC FOCUS      APPENDECTOMY       SECTION      COLONOSCOPY  2018    COLONOSCOPY WITH BIOPSY performed by Ila Zavala MD at [address] [place] [address]      laparoscopic    OVARY REMOVAL      bilateral    AK COLONOSCOPY FLX DX W/COLLJ SPEC WHEN PFRMD N/A 2018    COLONOSCOPY WITH MAC performed by Ila Zavala MD at [facility] ENDOSCOPY     Past Social History:  Social History Socioeconomic History    Marital status:      Spouse name: Not on file    Number of children: Not on file    Years of education: Not on file    Highest education level: Not on file   Occupational History    Not on file   Tobacco Use    Smoking status: Never Smoker    Smokeless tobacco: Never Used   Vaping Use    Vaping Use: Unknown   Substance and Sexual Activity    Alcohol use: Yes     Alcohol/week: 1.0 standard drinks     Types: 1 Glasses of wine per week     Comment: once a week    Drug use: No    Sexual activity: Not on file   Other Topics Concern    Not on file   Social History Narrative    Not on file     Social Determinants of Health     Financial Resource Strain:     Difficulty of Paying Living Expenses:    Food Insecurity:     Worried About Running Out of Food in the Last Year:     [address] in the Last Year:    Transportation Needs:     Lack of Transportation (Medical):  Lack of Transportation (Non-Medical):    Physical Activity:     Days of Exercise per Week:     Minutes of Exercise per Session:    Stress:     Feeling of Stress :    Social Connections:     Frequency of Communication with Friends and Family:     Frequency of Social Gatherings with Friends and Family:     Attends [employer] Services:     Active Member of Clubs or Organizations:     Attends Club or Organization Meetings:     Marital Status:    Intimate Partner Violence:     Fear of Current or Ex-Partner:     Emotionally Abused:     Physically Abused:     Sexually Abused:          Medications Prior to Admission:      Prior to Admission medications    Medication Sig Start Date End Date Taking?  Authorizing Provider   aspirin EC 81 MG EC tablet Take 1 tablet by mouth 2 times daily for 14 days 5/26/21 6/9/21 Yes Ya Head MD   naproxen (NAPROSYN) 500 MG tablet Take 1 tablet by mouth 2 times daily (with meals) for 14 days 5/26/21 6/9/21 Yes Ya Head MD   desvenlafaxine succinate (PRISTIQ) 50 MG TB24 extended release tablet Take 1 tablet by mouth daily 3/10/21  Yes Ulises Hussein MD   oxyCODONE-acetaminophen (PERCOCET) 5-325 MG per tablet Take 1 tablet by mouth every 6 hours as needed for Pain for up to 5 days. 5/26/21 5/31/21  Elana Duran MD   senna (SENOKOT) 8.6 MG TABS tablet Take 1 tablet by mouth 2 times daily for 7 days 5/26/21 6/2/21  Elana Duran MD   traMADol (ULTRAM) 50 MG tablet Take 1 tablet by mouth every 6 hours as needed for Pain for up to 5 days. 5/26/21 5/31/21  Elana Duran MD   methylphenidate (CONCERTA) 36 MG extended release tablet Take 1 tablet by mouth every morning for 30 days. 6/8/21 7/8/21  Ulises Hussein MD   methylphenidate (CONCERTA) 36 MG extended release tablet Take 1 tablet by mouth every morning for 30 days. 3/10/21 5/13/21  Ulises Hussein MD   methylphenidate (CONCERTA) 36 MG extended release tablet Take 1 tablet by mouth every morning for 30 days. 4/9/21 5/9/21  Ulises Hussein MD   methylphenidate (CONCERTA) 36 MG extended release tablet Take 1 tablet by mouth daily for 30 days.  2/9/21 5/13/21  Ulises Hussein MD   Crisaborole (EUCRISA) 2 % OINT Apply to affected area QD as directed  Patient not taking: Reported on 3/10/2021 1/26/21   Ulises Hussein MD   hydrOXYzine (ATARAX) 25 MG tablet Take 1 tablet by mouth every 8 hours as needed for Anxiety 6/22/20   Ulises Hussein MD   albuterol sulfate HFA (PROVENTIL HFA) 108 (90 Base) MCG/ACT inhaler Inhale 2 puffs into the lungs every 6 hours as needed for Wheezing 1/13/20   Ulises Hussein MD   omeprazole (PRILOSEC) 40 MG delayed release capsule Take 1 capsule by mouth daily 12/9/19   Ulises Hussein MD         Allergies:  Fioricet [butalbital-apap-caffeine]    PHYSICAL EXAM:      BP (!) 154/94   Pulse 90   Temp 98.7 °F (37.1 °C) (Oral)   Resp 18   Ht 5' 1\" (1.549 m)   Wt 180 lb (81.6 kg)   SpO2 96%   BMI 34.01 kg/m²      Airway:  Airway patent with no audible stridor Heart:  Regular rate and rhythm, No murmur noted    Lungs:  No increased work of breathing, good air exchange, clear to auscultation bilaterally, no crackles or wheezing    Abdomen:  Soft, non-distended, non-tender, normal active bowel sounds, no masses palpated    ASSESSMENT AND PLAN    Patient is a 63 y.o. female with above specified procedure planned. 1.  The patients history and physical was obtained and signed off by the pre-admission testing department. Patient seen and focused exam done today- no new changes since last physical exam on 5/13/21    2. Access to ancillary services are available per request of the provider.     MURRAY Camacho - CNP     5/28/2021

## 2021-05-28 NOTE — PROGRESS NOTES
Dr. Antonio Collins at bedside at [phone] in PACU talking with patient and applying knee high TREVER hose.

## 2021-06-02 ENCOUNTER — HOSPITAL ENCOUNTER (OUTPATIENT)
Dept: PHYSICAL THERAPY | Age: 56
Setting detail: THERAPIES SERIES
Discharge: HOME | End: 2021-06-02
Payer: COMMERCIAL

## 2021-06-02 ENCOUNTER — TELEPHONE (OUTPATIENT)
Dept: ORTHOPEDIC SURGERY | Age: 56
End: 2021-06-02

## 2021-06-02 ENCOUNTER — OFFICE VISIT (OUTPATIENT)
Dept: ORTHOPEDIC SURGERY | Age: 56
End: 2021-06-02

## 2021-06-02 VITALS — BODY MASS INDEX: 33.99 KG/M2 | HEIGHT: 61 IN | WEIGHT: 180 LBS

## 2021-06-02 DIAGNOSIS — M25.751 FEMOROACETABULAR IMPINGEMENT WITH OSTEOPHYTE OF RIGHT HIP: ICD-10-CM

## 2021-06-02 DIAGNOSIS — M25.551 RIGHT HIP PAIN: ICD-10-CM

## 2021-06-02 DIAGNOSIS — M25.851 FEMOROACETABULAR IMPINGEMENT WITH OSTEOPHYTE OF RIGHT HIP: ICD-10-CM

## 2021-06-02 DIAGNOSIS — Z98.890 STATUS POST HIP SURGERY: Primary | ICD-10-CM

## 2021-06-02 PROCEDURE — 97140 MANUAL THERAPY 1/> REGIONS: CPT

## 2021-06-02 PROCEDURE — 99024 POSTOP FOLLOW-UP VISIT: CPT | Performed by: ORTHOPAEDIC SURGERY

## 2021-06-02 PROCEDURE — 97110 THERAPEUTIC EXERCISES: CPT

## 2021-06-02 RX ORDER — OXYCODONE HYDROCHLORIDE AND ACETAMINOPHEN 5; 325 MG/1; MG/1
1 TABLET ORAL EVERY 6 HOURS PRN
Qty: 20 TABLET | Refills: 0 | Status: SHIPPED | OUTPATIENT
Start: 2021-06-02 | End: 2021-06-07

## 2021-06-02 RX ORDER — PROMETHAZINE HYDROCHLORIDE 25 MG/1
25 TABLET ORAL 3 TIMES DAILY PRN
Qty: 20 TABLET | Refills: 0 | Status: SHIPPED | OUTPATIENT
Start: 2021-06-02 | End: 2021-06-09

## 2021-06-02 NOTE — PROGRESS NOTES
History of Present Illness:  Reuben Parra is a pleasant 63 y.o. female who presents for a post operative visit. She is 5 days out following a right hip arthroscopy, acetabuloplasty, and labral repair. Overall She is still in pain and feels that her pain is not well controlled with current pain medications. She has been compliant with the 20lb flat foot weight bearing instructions and the hip abductor brace. She plans to do physical therapy at our [facility] office. She felt a pop in her hip a couple days ago when she was shifting, but had no worsening pain. Her groin pain has subsided from pre-op. Most pain is lateral along the arthroscopy portals. She denies fevers, chills, numbness, tingling, and shortness of breath. However she is having plenty of nausea which was initially well controlled with the antinausea patch given to her by anesthesia at the time of surgery which lasted her 3 days. Medical History:  Patient's medications, allergies, past medical, surgical, social and family histories were reviewed and updated as appropriate. No notes on file    Review of Systems  A 14 point review of systems was completed by the patient and is available in the media section of the scanned medical record and was reviewed on 6/2/2021. Vital Signs: There were no vitals filed for this visit. General/Appearance: Alert and oriented and in no apparent distress. Skin:  There are no skin lesions, cellulitis, or extreme edema. The patient has warm and well-perfused Bilateral lower  extremities with brisk capillary refill. Hip  Exam: Right    Inspection: Hip incision(s)  are clean, dry and intact and well approximated. The nylon closure is still in place. Mild ecchymosis and swelling are present as can be expected.  There is no erythema, drainage or other signs of infection    Palpation:  No crepitus to gentle motion / circumduction of the hip    Active Range of Motion: Deferred    Passive Range of Motion: 0 to 60 degrees of flexion. Strength:  Deferred    Special Tests:  Deferred. Neurovascular: Sensation to light touch is intact, no motor deficits, palpable radial pulses 2+    Radiology:     Plain radiographs of the right hip comprising 2 views were obtained and reviewed in the office: Shows postsurgical changes from the hip arthroscopy and acetabuloplasty. No evidence of acute fracture or complications. Impression: Stable postop x-ray. Assessment :  Ms. James Snowden is a pleasant 63 y.o. patient who is 5 days post right hip arthroscopy, acetabuloplasty, labral repair. .        Impression:  Encounter Diagnosis   Name Primary?  Right hip pain Yes       Office Procedures:  Orders Placed This Encounter   Procedures    XR HIP RIGHT (2-3 VIEWS)     Standing Status:   Future     Number of Occurrences:   1     Standing Expiration Date:   6/2/2022       Treatment Plan:    Overall James Snowden is doing well. However the pain is not well controlled and she has some nausea. We will refill her Percocet for 20 tabs 1 more time but then her pain should improve and there should be no further need for narcotic medicine. We will also prescribe her Phenergan 25 mg 3 times a day as needed. We recommend that She commence with physical therapy for timeline based progression of motion, weight bearing, and and strengthening. The patient was told that she is restricted from driving for at least 4weeks postop. They were advised to continue their ASA, NSAIDs, and Sven-Hose compression stockings and abductor brace for 14 days post surgery. All of her  questions were fully answered today. We would like to see James Snowden back in 2 suture removal.  Weeks for follow-up     Sincerely,    Francisco May MD [facility] and [address] [place] [address]   [address], [address]  Email: [email or web address]  Office: [phone]    06/02/21  9:50 AM

## 2021-06-02 NOTE — TELEPHONE ENCOUNTER
General Question     Subject: KATIA- FROM [facility] [employer] SHORT TERM DISABILITY BENEFITS  Patient and /or Facility Request: [address]  Contact Number: [phone]

## 2021-06-02 NOTE — PLAN OF CARE
[facility]     Physical Therapy Re-Certification Plan of Care    Dear  Dr. Mauro Dunn,    We had the pleasure of treating the following patient for physical therapy services at [address] [place] [address]. A summary of our findings can be found in the updated assessment below. This includes our plan of care. If you have any questions or concerns regarding these findings, please do not hesitate to contact me at the office phone number checked above. Thank you for the referral.     Physician Signature:________________________________Date:__________________  By signing above (or electronic signature), therapists plan is approved by physician      Functional Outcome: LEFS 100% LOF    Overall Response to Treatment:   []Patient is responding well to treatment and improvement is noted with regards  to goals   []Patient should continue to improve in reasonable time if they continue HEP   []Patient has plateaued and is no longer responding to skilled PT intervention    []Patient is getting worse and would benefit from return to referring MD   []Patient unable to adhere to initial POC   [x]Other: Initiate post operative rehabilitation s/p (R) hip scope    Date range of Visits: -  Total Visits: 1    Recommendation:    []Continue PT 1-2x / wk for 12 weeks.     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  · Treatment Diagnosis: PT treatment diagnosis:  right hip pain  C13.359  Insurance/Certification information:   [facility]  20 visits/yr,  $40 copay  Physician Information:  Referring Practitioner: Dr Michele Plascencia of care signed (Y/N):     Date of Patient follow up with Physician:     Is this a Progress Report:     [x]  Yes  [] No        If Yes:  Date Range for reporting period:  Beginnin2021  Endin2021    Progress report will be due (10 Rx or 30 days whichever is less):  612      Recertification will be due (POC Duration  / 90 days whichever is less):  90d        Visit # Insurance Allowable Auth Required   3 20 []  Yes [x]  No        Functional Scale:   LEFS: 0/80 100% LOF   Date assessed:        Latex Allergy:  [x]NO      []YES  Preferred Language for Healthcare:   [x]English       []other    Pain level:  8/10     SUBJECTIVE:  Pt is now 5 days s/p (R) hip arthroscopy, acetabuloplasty, and labral repair. Pt is currently 20 lbs foot flat weight bearing and wearing her abductor brace. Pt followed up with her orthopaedic surgeon Dr. Matthew Pepe earlier today. Pt presents without her hip abductor brace, but reports she has otherwise been compliant with it's use. Pt reports pain is currently severe and that she is also experiencing nausea.         Type: [x]Constant   []Intermitment  []Radiating [x]Localized  []other:     Functional Limitations: [x]Sitting [x]Standing [x]Walking    [x]Squatting [x]Stairs                [x]ADL's  [x]Driving []Sports/Recreation  []Other:      OBJECTIVE: 21      ROM LEFT RIGHT   HIP Flex 116 72   HIP Abd NT  NT    HIP Ext NT NT   HIP IR NT NT   HIP ER NT NT   Knee ext 0 0   Knee Flex 135 125   Strength Deferred due to recent sx LEFT RIGHT   HIP Flexors     HIP Abductors     HIP Ext     Hip ER     Knee EXT (quad)     Knee Flex (HS)           Gait: TTWB with (B) axillary crutches      RESTRICTIONS/PRECAUTIONS:     Exercises/Interventions:             Script-  Therapeutic Activity 5'     Reviewed use of crutches/safe navigation adhering to post op restrictions    BKFO                 Therapeutic Exercise  Exercises x 10'     Add Iso 10x10''    Quad Sets 10x10''    Supine glut sets 10x10''    TA contraction 10x10''                 Manual 6'     Hip Mobs with Belt     Hip ROM/Circumduction Within restrictions 8'    Patellar Mobs     Ankle Mobs/PROM Grade-               Therapeutic Exercise and NMR EXR  [x] (44543) Provided verbal/tactile cueing for activities related to strengthening, flexibility, endurance, ROM for improvements in LE, proximal hip, and core control with self care, mobility, lifting, ambulation.  [] (75883) Provided verbal/tactile cueing for activities related to improving balance, coordination, kinesthetic sense, posture, motor skill, proprioception  to assist with LE, proximal hip, and core control in self care, mobility, lifting, ambulation and eccentric single leg control.      NMR and Therapeutic Activities:    [] (57527 or 21007) Provided verbal/tactile cueing for activities related to improving balance, coordination, kinesthetic sense, posture, motor skill, proprioception and motor activation to allow for proper function of core, proximal hip and LE with self care and ADLs  [x] (20304) Gait Re-education- Provided training and instruction to the patient for proper LE, core and proximal hip recruitment and positioning and eccentric body weight control with ambulation re-education including up and down stairs     Home Exercise Program:    [x] (19624) Reviewed/Progressed HEP activities related to strengthening, flexibility, endurance, ROM of core, proximal hip and LE for functional self-care, mobility, lifting and ambulation/stair navigation   [] (78569)Reviewed/Progressed HEP activities related to improving balance, coordination, kinesthetic sense, posture, motor skill, proprioception of core, proximal hip and LE for self care, mobility, lifting, and ambulation/stair navigation      Manual Treatments:  PROM / STM / Oscillations-Mobs:  G-I, II, III, IV (PA's, Inf., Post.)  [x] (76253) Provided manual therapy to mobilize LE, proximal hip and/or LS spine soft tissue/joints for the purpose of modulating pain, promoting relaxation,  increasing ROM, reducing/eliminating soft tissue swelling/inflammation/restriction, improving soft tissue extensibility and allowing for proper ROM for normal function with self care, mobility, lifting and ambulation. Modalities:  declined    Charges:  Timed Code Treatment Minutes: 23   Total Treatment Minutes: 40     [] EVAL (LOW) 35619 (typically 20 minutes face-to-face)  [] EVAL (MOD) 76567 (typically 30 minutes face-to-face)  [] EVAL (HIGH) 03995 (typically 45 minutes face-to-face)  [] RE-EVAL     [x] EF(78366) x 1    [] IONTO  [] NMR (18049) x     [] VASO  [x] Manual (73730) x1     [] Other:  [] TA x  1    [] Mech Traction (13781)  [] ES(attended) (71884)      [] ES (un) (64868):     GOALS:  Updated 6/2  Short Term Goals: To be achieved in: 2 week  1. Independent in HEP and progression per patient tolerance, in order to prevent re-injury. [] Progressing: [] Met: [] Not Met: [] Adjusted   2. Patient will have a 50% or > decrease in pain at worse (R) hip in order for patient to progress per protocol in therapy   [] Progressing: [] Met: [] Not Met: [] Adjusted     Long Term Goals: To be achieved in: 12 weeks  1. Patient will demonstrate full functional AROM of the (R) hip in all planes to help normalize gait pattern and for improved performance of daily activities. [] Progressing: [] Met: [] Not Met: [] Adjusted   2. Patient will demonstrate a 50% or > improvement on her LEFS score for improved perceived functioning with daily activities. [] Progressing: [] Met: [] Not Met: [] Adjusted   3. Patient will demonstrate proximal hip strength of 4/5 or > for adequate hip stability and improved performance of normal daily/work activities. [] Progressing: [] Met: [] Not Met: [] Adjusted         Progression Towards Functional goals:  [] Patient is progressing as expected towards functional goals listed. [] Progression is slowed due to complexities listed. [] Progression has been slowed due to co-morbidities.   [x] Plan just implemented, too soon to assess goals progression  [] Other:     ASSESSMENT: Goals updated this date. Pt returns to therapy today 5 days post op (R) hip scope. Pt presents with expected post operative ROM and strength restrictions as well as significant discomfort per pt report. The pt would benefit from a progressive post operative protocol commencing to help gradually restore normal gait, strength, and ROM in order for pt to return to her PLOF. Pt is in agreement with this plan. Re-educated pt on all post op restrictions, WB status, and importance with brace compliance. Treatment/Activity Tolerance:  [] Patient tolerated treatment well [] Patient limited by fatique  [x] Patient limited by pain  [] Patient limited by other medical complications  [] Other:     Prognosis: [x] Good [] Fair  [] Poor    Patient Requires Follow-up: [x] Yes  [] No    PLAN: Initiate post operative therapy 1-2x/wk for 12 weeks  [] Continue per plan of care [x] Alter current plan (see comments)  [] Plan of care initiated [] Hold pending MD visit [] Discharge      Electronically signed by: Lory Jade PT, DPT       Note: If patient does not return for scheduled/ recommended follow up visits, this note will serve as a discharge from care along with most recent update on progress.

## 2021-06-07 ENCOUNTER — HOSPITAL ENCOUNTER (OUTPATIENT)
Dept: PHYSICAL THERAPY | Age: 56
Setting detail: THERAPIES SERIES
Discharge: HOME | End: 2021-06-07
Payer: COMMERCIAL

## 2021-06-07 DIAGNOSIS — M25.751 FEMOROACETABULAR IMPINGEMENT WITH OSTEOPHYTE OF RIGHT HIP: ICD-10-CM

## 2021-06-07 DIAGNOSIS — M25.851 FEMOROACETABULAR IMPINGEMENT WITH OSTEOPHYTE OF RIGHT HIP: ICD-10-CM

## 2021-06-07 PROCEDURE — 97110 THERAPEUTIC EXERCISES: CPT

## 2021-06-07 PROCEDURE — 97140 MANUAL THERAPY 1/> REGIONS: CPT

## 2021-06-07 PROCEDURE — 97112 NEUROMUSCULAR REEDUCATION: CPT

## 2021-06-07 RX ORDER — NAPROXEN 500 MG/1
TABLET ORAL
Qty: 28 TABLET | Refills: 0 | Status: SHIPPED | OUTPATIENT
Start: 2021-06-07 | End: 2021-07-21

## 2021-06-07 NOTE — TELEPHONE ENCOUNTER
Requested Prescriptions     Pending Prescriptions Disp Refills    naproxen (NAPROSYN) 500 MG tablet [Pharmacy Med Name: NAPROXEN 500MG TABLETS] 28 tablet 0     Sig: TAKE 1 TABLET BY MOUTH TWICE DAILY WITH MEALS     Patient last seen 06/02/21 and medication last filled 05/26/21:

## 2021-06-07 NOTE — FLOWSHEET NOTE
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  · Treatment Diagnosis: PT treatment diagnosis:  right hip pain  Q46.018  Insurance/Certification information:   [facility]  20 visits/yr,  $40 copay  Physician Information:  Referring Practitioner: Dr Soumya Lopez of care signed (Y/N): Y    Date of Patient follow up with Physician:     Is this a Progress Report:     []  Yes  [x]  No        If Yes:  Date Range for reporting period:  Beginnin2021  Endin2021    Progress report will be due (10 Rx or 30 days whichever is less):  4323      Recertification will be due (POC Duration  / 90 days whichever is less):  90d        Visit # Insurance Allowable Auth Required   4 20 []  Yes [x]  No        Functional Scale:   LEFS: 0/80 100% LOF   Date assessed:        Latex Allergy:  [x]NO      []YES  Preferred Language for Healthcare:   [x]English       []other    Pain level: 3-4/10     SUBJECTIVE:  Pt states pain has improved significantly over the past couple of days. Pt states she has not had to take a Percocet in two days. Pt notes concern over some redness and pain around one of her portal sites at the anterior (R) hip.                 OBJECTIVE:         ROM LEFT RIGHT   HIP Flex 116 72   HIP Abd NT  NT    HIP Ext NT NT   HIP IR NT NT   HIP ER NT NT   Knee ext 0 0   Knee Flex 135 125   Strength Deferred due to recent sx LEFT RIGHT   HIP Flexors     HIP Abductors     HIP Ext     Hip ER     Knee EXT (quad)     Knee Flex (HS)           Gait: TTWB with (B) axillary crutches, crutches are observed as being too low this date, step to pattern with poor utilization of crutches during (R) step RESTRICTIONS/PRECAUTIONS:     Exercises/Interventions:             Script-  Therapeutic Activity                              Neuromuscular Re-education x 15'     Ta activation with gentle UE perturbations @ 90 deg 20\"    Gait training Crutch height adjustment, working on step through pattern, crutch and (R) step coordination maintaining WB status 10'                  BKFO                 Therapeutic Exercise  Exercises x 15'     Add Iso 10x10''    Quad Sets 10x10''    Supine glut sets 10x10''    TA contraction 10x10''    Abd isometrics 5 x 10\" Added 6/7                          Manual 10'     Hip Mobs with Belt     Hip ROM/Circumduction Within restrictions 10'    Patellar Mobs     Ankle Mobs/PROM Grade-               Therapeutic Exercise and NMR EXR  [x] (86777) Provided verbal/tactile cueing for activities related to strengthening, flexibility, endurance, ROM for improvements in LE, proximal hip, and core control with self care, mobility, lifting, ambulation.  [] (09793) Provided verbal/tactile cueing for activities related to improving balance, coordination, kinesthetic sense, posture, motor skill, proprioception  to assist with LE, proximal hip, and core control in self care, mobility, lifting, ambulation and eccentric single leg control.      NMR and Therapeutic Activities:    [] (59168 or 20947) Provided verbal/tactile cueing for activities related to improving balance, coordination, kinesthetic sense, posture, motor skill, proprioception and motor activation to allow for proper function of core, proximal hip and LE with self care and ADLs  [x] (65250) Gait Re-education- Provided training and instruction to the patient for proper LE, core and proximal hip recruitment and positioning and eccentric body weight control with ambulation re-education including up and down stairs     Home Exercise Program:    [x] (41540) Reviewed/Progressed HEP activities related to strengthening, flexibility, endurance, ROM of core, proximal hip and LE for functional self-care, mobility, lifting and ambulation/stair navigation   [] (27224)Reviewed/Progressed HEP activities related to improving balance, coordination, kinesthetic sense, posture, motor skill, proprioception of core, proximal hip and LE for self care, mobility, lifting, and ambulation/stair navigation      Manual Treatments:  PROM / STM / Oscillations-Mobs:  G-I, II, III, IV (PA's, Inf., Post.)  [x] (18395) Provided manual therapy to mobilize LE, proximal hip and/or LS spine soft tissue/joints for the purpose of modulating pain, promoting relaxation,  increasing ROM, reducing/eliminating soft tissue swelling/inflammation/restriction, improving soft tissue extensibility and allowing for proper ROM for normal function with self care, mobility, lifting and ambulation. Modalities:  declined    Charges:  Timed Code Treatment Minutes: 40'   Total Treatment Minutes: 45     [] EVAL (LOW) 48921 (typically 20 minutes face-to-face)  [] EVAL (MOD) 11112 (typically 30 minutes face-to-face)  [] EVAL (HIGH) 00115 (typically 45 minutes face-to-face)  [] RE-EVAL     [x] KA(44775) x 1    [] IONTO  [x] NMR (50562) x 1    [] VASO  [x] Manual (21371) x1     [] Other:  [] TA x     [] Mech Traction (16381)  [] ES(attended) (41549)      [] ES (un) (80984):     GOALS:  Updated 6/2  Short Term Goals: To be achieved in: 2 week  1. Independent in HEP and progression per patient tolerance, in order to prevent re-injury. [] Progressing: [] Met: [] Not Met: [] Adjusted   2. Patient will have a 50% or > decrease in pain at worse (R) hip in order for patient to progress per protocol in therapy   [] Progressing: [] Met: [] Not Met: [] Adjusted     Long Term Goals: To be achieved in: 12 weeks  1. Patient will demonstrate full functional AROM of the (R) hip in all planes to help normalize gait pattern and for improved performance of daily activities. [] Progressing: [] Met: [] Not Met: [] Adjusted   2.  Patient will demonstrate a 50% or > improvement on her LEFS score for improved perceived functioning with daily activities. [] Progressing: [] Met: [] Not Met: [] Adjusted   3. Patient will demonstrate proximal hip strength of 4/5 or > for adequate hip stability and improved performance of normal daily/work activities. [] Progressing: [] Met: [] Not Met: [] Adjusted         Progression Towards Functional goals:  [x] Patient is progressing as expected towards functional goals listed. [] Progression is slowed due to complexities listed. [] Progression has been slowed due to co-morbidities. [] Plan just implemented, too soon to assess goals progression  [] Other:     ASSESSMENT: Pain much more controlled this date. No issues with gentle progressions this date. Spent significant time working on gait training due to pt presenting this date with poor mechanics and crutches at an inappropriate height. Pt does have one portal side with redness around the suture. However there is no evidence of drainage/bleeding currently and no other s/s of infection. Will continue to monitor this closely and will refer back to M.D. prior to scheduled appointment if this worsens. Treatment/Activity Tolerance:  [] Patient tolerated treatment well [] Patient limited by fatique  [x] Patient limited by pain  [] Patient limited by other medical complications  [] Other:     Prognosis: [x] Good [] Fair  [] Poor    Patient Requires Follow-up: [x] Yes  [] No    PLAN: Initiate post operative therapy 1-2x/wk for 12 weeks  [] Continue per plan of care [x] Alter current plan (see comments)  [] Plan of care initiated [] Hold pending MD visit [] Discharge      Electronically signed by: Early Severin PT, DPT       Note: If patient does not return for scheduled/ recommended follow up visits, this note will serve as a discharge from care along with most recent update on progress.

## 2021-06-08 ENCOUNTER — APPOINTMENT (OUTPATIENT)
Dept: PHYSICAL THERAPY | Age: 56
End: 2021-06-08
Payer: COMMERCIAL

## 2021-06-10 ENCOUNTER — HOSPITAL ENCOUNTER (OUTPATIENT)
Dept: PHYSICAL THERAPY | Age: 56
Setting detail: THERAPIES SERIES
Discharge: HOME | End: 2021-06-10
Payer: COMMERCIAL

## 2021-06-10 DIAGNOSIS — Z98.890 STATUS POST HIP SURGERY: Primary | ICD-10-CM

## 2021-06-10 PROCEDURE — 97110 THERAPEUTIC EXERCISES: CPT

## 2021-06-10 PROCEDURE — 97140 MANUAL THERAPY 1/> REGIONS: CPT

## 2021-06-10 RX ORDER — CEPHALEXIN 500 MG/1
500 CAPSULE ORAL 4 TIMES DAILY
Qty: 28 CAPSULE | Refills: 0 | Status: SHIPPED | OUTPATIENT
Start: 2021-06-10 | End: 2021-07-21

## 2021-06-10 NOTE — FLOWSHEET NOTE
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  · Treatment Diagnosis: PT treatment diagnosis:  right hip pain  X74.665  Insurance/Certification information:   [facility]  20 visits/yr,  $40 copay  Physician Information:  Referring Practitioner: Dr Abdelrahman Roman of care signed (Y/N): Y    Date of Patient follow up with Physician:     Is this a Progress Report:     []  Yes  [x]  No        If Yes:  Date Range for reporting period:  Beginnin2021  Endin2021    Progress report will be due (10 Rx or 30 days whichever is less):  2487      Recertification will be due (POC Duration  / 90 days whichever is less):  90d        Visit # Insurance Allowable Auth Required   5 20 []  Yes [x]  No        Functional Scale:   LEFS: 0/80 100% LOF   Date assessed:        Latex Allergy:  [x]NO      []YES  Preferred Language for Healthcare:   [x]English       []other    Pain level: 3-4/10     SUBJECTIVE:  Pt states she is concerned over her upper incision/suture area. Pt feels the redness is worse and pt states she has seen pus. Pt wishes for her incision to be observed to check or signs of infection. Pt notes that otherwise, she is doing fairly well with pain. Pt states she still has some difficulty ambulating correctly with her crutches.           OBJECTIVE: 6/10    Observation: increased redness around superior portion of incision/suture site, no active drainage noted (surgeon notified as a precaution)    ROM LEFT RIGHT   HIP Flex 116 90   HIP Abd NT  NT    HIP Ext NT NT   HIP IR NT NT   HIP ER NT NT   Knee ext 0 0   Knee Flex 135 125   Strength Deferred due to recent sx LEFT RIGHT   HIP Flexors HIP Abductors     HIP Ext     Hip ER     Knee EXT (quad)     Knee Flex (HS)           Gait: TTWB with (B) axillary crutches, crutches are observed as being too low this date, step to pattern with poor utilization of crutches during (R) step      RESTRICTIONS/PRECAUTIONS:     Exercises/Interventions:             Script-  Therapeutic Activity                              Neuromuscular Re-education x 5     Ta activation with gentle UE perturbations @ 90 deg 3x20\"    Gait training Crutch height adjustment, working on step through pattern, crutch and (R) step coordination maintaining WB status 10'                  BKFO                 Therapeutic Exercise  Exercises x 20'     Add Iso 10x10''    Quad Sets 10x10''    Supine glut sets 10x10''    TA contraction 10x10''    Abd isometrics 5 x 10\" Added 6/7   LAQ 2x15 Added 6/10   Standing HSC 2x15 Added 6/10                Manual 10'     Hip Mobs with Belt     Hip ROM/Circumduction Within current restrictions 10'    Patellar Mobs     Ankle Mobs/PROM Grade-               Therapeutic Exercise and NMR EXR  [x] (53593) Provided verbal/tactile cueing for activities related to strengthening, flexibility, endurance, ROM for improvements in LE, proximal hip, and core control with self care, mobility, lifting, ambulation.  [] (56689) Provided verbal/tactile cueing for activities related to improving balance, coordination, kinesthetic sense, posture, motor skill, proprioception  to assist with LE, proximal hip, and core control in self care, mobility, lifting, ambulation and eccentric single leg control.      NMR and Therapeutic Activities:    [] (41751 or 56678) Provided verbal/tactile cueing for activities related to improving balance, coordination, kinesthetic sense, posture, motor skill, proprioception and motor activation to allow for proper function of core, proximal hip and LE with self care and ADLs  [x] (45027) Gait Re-education- Provided training and instruction to the patient [] Not Met: [] Adjusted     Long Term Goals: To be achieved in: 12 weeks  1. Patient will demonstrate full functional AROM of the (R) hip in all planes to help normalize gait pattern and for improved performance of daily activities. [] Progressing: [] Met: [] Not Met: [] Adjusted   2. Patient will demonstrate a 50% or > improvement on her LEFS score for improved perceived functioning with daily activities. [] Progressing: [] Met: [] Not Met: [] Adjusted   3. Patient will demonstrate proximal hip strength of 4/5 or > for adequate hip stability and improved performance of normal daily/work activities. [] Progressing: [] Met: [] Not Met: [] Adjusted         Progression Towards Functional goals:  [x] Patient is progressing as expected towards functional goals listed. [] Progression is slowed due to complexities listed. [] Progression has been slowed due to co-morbidities. [] Plan just implemented, too soon to assess goals progression  [] Other:     ASSESSMENT: Pt's referring M.AIDAN Call was notified of pt's incisional area. No active drainage noted at this time. Progressed ROM per protocol, but continue to protect pt's hip per current restrictions. Pt's pain continues to be more controlled each visit. Pt still has a difficult time utilizing the crutches correctly and requires intermittent cueing. PT would continue to benefit from formal therapy at this time in order to help return normal functional strength and mobility of the involved hip.   Treatment/Activity Tolerance:  [] Patient tolerated treatment well [] Patient limited by fatique  [x] Patient limited by pain  [] Patient limited by other medical complications  [] Other:     Prognosis: [x] Good [] Fair  [] Poor    Patient Requires Follow-up: [x] Yes  [] No    PLAN: Initiate post operative therapy 1-2x/wk for 12 weeks  [] Continue per plan of care [x] Alter current plan (see comments)  [] Plan of care initiated [] Hold pending MD visit [] Discharge      Electronically signed by: Caitlyn Salazar PT, DPT       Note: If patient does not return for scheduled/ recommended follow up visits, this note will serve as a discharge from care along with most recent update on progress.

## 2021-06-10 NOTE — TELEPHONE ENCOUNTER
Notification received today from PT regarding possible wound irritation. Upon review, Dr. Arnoldo Galvez would like to place patient on a short course of Kelfex to be cautious. Most likely superficial wound irritation.

## 2021-06-15 ENCOUNTER — HOSPITAL ENCOUNTER (OUTPATIENT)
Dept: PHYSICAL THERAPY | Age: 56
Setting detail: THERAPIES SERIES
Discharge: HOME | End: 2021-06-15
Payer: COMMERCIAL

## 2021-06-15 PROCEDURE — 97140 MANUAL THERAPY 1/> REGIONS: CPT

## 2021-06-15 PROCEDURE — 97112 NEUROMUSCULAR REEDUCATION: CPT

## 2021-06-15 PROCEDURE — 97110 THERAPEUTIC EXERCISES: CPT

## 2021-06-15 NOTE — FLOWSHEET NOTE
Knee Flex (HS)           Gait: TTWB with (B) axillary crutches At times, pt noted WB through her (R) LE prior to utilizing crutches (step happens just before crutches are on ground)1      RESTRICTIONS/PRECAUTIONS:     Exercises/Interventions:             Script-  Therapeutic Activity                              Neuromuscular Re-education x 10     Ta activation with gentle UE perturbations @ 90 deg 3x20\"    Gait training Crutch height adjustment, working on step through pattern, crutch and (R) step coordination maintaining WB status 10'                  BKFO                 Therapeutic Exercise  Exercises x 20'     Add Iso 10x10''    Quad Sets 10x10''    Supine glut sets 10x10''    TA contraction 10x10''    Abd isometrics 5 x 10\" Added 6/7   LAQ 2x15 Added 6/10   Standing HSC 2x15 Added 6/10   Prone Laying for gentle hip flexor stretch/contracture prevention 3' Added 6/15                          Manual 16'          Hip ROM/Circumduction Within current restrictions 10'    STM anterior hip/thigh 6'    Ankle Mobs/PROM Grade-               Therapeutic Exercise and NMR EXR  [x] (20961) Provided verbal/tactile cueing for activities related to strengthening, flexibility, endurance, ROM for improvements in LE, proximal hip, and core control with self care, mobility, lifting, ambulation.  [] (63141) Provided verbal/tactile cueing for activities related to improving balance, coordination, kinesthetic sense, posture, motor skill, proprioception  to assist with LE, proximal hip, and core control in self care, mobility, lifting, ambulation and eccentric single leg control.      NMR and Therapeutic Activities:    [] (84844 or 53509) Provided verbal/tactile cueing for activities related to improving balance, coordination, kinesthetic sense, posture, motor skill, proprioception and motor activation to allow for proper function of core, proximal hip and LE with self care and ADLs  [x] (78066) Gait Re-education- Provided training and instruction to the patient for proper LE, core and proximal hip recruitment and positioning and eccentric body weight control with ambulation re-education including up and down stairs     Home Exercise Program:    [x] (16886) Reviewed/Progressed HEP activities related to strengthening, flexibility, endurance, ROM of core, proximal hip and LE for functional self-care, mobility, lifting and ambulation/stair navigation   [] (14255)Reviewed/Progressed HEP activities related to improving balance, coordination, kinesthetic sense, posture, motor skill, proprioception of core, proximal hip and LE for self care, mobility, lifting, and ambulation/stair navigation      Manual Treatments:  PROM / STM / Oscillations-Mobs:  G-I, II, III, IV (PA's, Inf., Post.)  [x] (72225) Provided manual therapy to mobilize LE, proximal hip and/or LS spine soft tissue/joints for the purpose of modulating pain, promoting relaxation,  increasing ROM, reducing/eliminating soft tissue swelling/inflammation/restriction, improving soft tissue extensibility and allowing for proper ROM for normal function with self care, mobility, lifting and ambulation. Modalities:  declined    Charges:  Timed Code Treatment Minutes: 55'   Total Treatment Minutes: 46     [] EVAL (LOW) 35233 (typically 20 minutes face-to-face)  [] EVAL (MOD) 99562 (typically 30 minutes face-to-face)  [] EVAL (HIGH) 41698 (typically 45 minutes face-to-face)  [] RE-EVAL     [x] FB(46972) x 1    [] IONTO  [x] NMR (86557) x1    [] VASO  [x] Manual (73274) x1     [] Other:  [] TA x     [] Mech Traction (87624)  [] ES(attended) (14546)      [] ES (un) (82632):     GOALS:  Updated 6/2  Short Term Goals: To be achieved in: 2 week  1. Independent in HEP and progression per patient tolerance, in order to prevent re-injury. [] Progressing: [] Met: [] Not Met: [] Adjusted   2.  Patient will have a 50% or > decrease in pain at worse (R) hip in order for patient to progress per protocol in therapy   [] Progressing: [] Met: [] Not Met: [] Adjusted     Long Term Goals: To be achieved in: 12 weeks  1. Patient will demonstrate full functional AROM of the (R) hip in all planes to help normalize gait pattern and for improved performance of daily activities. [] Progressing: [] Met: [] Not Met: [] Adjusted   2. Patient will demonstrate a 50% or > improvement on her LEFS score for improved perceived functioning with daily activities. [] Progressing: [] Met: [] Not Met: [] Adjusted   3. Patient will demonstrate proximal hip strength of 4/5 or > for adequate hip stability and improved performance of normal daily/work activities. [] Progressing: [] Met: [] Not Met: [] Adjusted         Progression Towards Functional goals:  [x] Patient is progressing as expected towards functional goals listed. [] Progression is slowed due to complexities listed. [] Progression has been slowed due to co-morbidities. [] Plan just implemented, too soon to assess goals progression  [] Other:     ASSESSMENT:  Localized redness/irritation remains around superior suture site (R) hip. No active drainage noted today, however pt maintains that she has seen pus. Instructed pt to take a picture if she sees pus or drainage in order to show the M.D. at her appointment tomorrow. Pt appears anxious in regards to her hips current condition and is concerned that she has the same pain she had prior to surgery. Encouraged pt to communicate these concerns at her appointment tomorrow. Spent significant time on gait training to help increase safety/adherence to WB restrictions with crutches. Pt was observed WB on the (R) beyond current restrictions prior to crutches making contact to help offlaod. This was corrected with cueing and pt practice.       Treatment/Activity Tolerance:  [] Patient tolerated treatment well [] Patient limited by fatique  [x] Patient limited by pain  [] Patient limited by other medical complications  [] Other: Prognosis: [x] Good [] Fair  [] Poor    Patient Requires Follow-up: [x] Yes  [] No    PLAN: Re-assess following upcoming M.D. appointment. Pending any issues, progress per protocol  [] Continue per plan of care [x] Alter current plan (see comments)  [] Plan of care initiated [] Hold pending MD visit [] Discharge      Electronically signed by: Ila Landers PT, DPT       Note: If patient does not return for scheduled/ recommended follow up visits, this note will serve as a discharge from care along with most recent update on progress.

## 2021-06-16 ENCOUNTER — OFFICE VISIT (OUTPATIENT)
Dept: ORTHOPEDIC SURGERY | Age: 56
End: 2021-06-16

## 2021-06-16 VITALS — WEIGHT: 180 LBS | BODY MASS INDEX: 33.99 KG/M2 | RESPIRATION RATE: 12 BRPM | HEIGHT: 61 IN

## 2021-06-16 DIAGNOSIS — M25.851 FEMOROACETABULAR IMPINGEMENT WITH OSTEOPHYTE OF RIGHT HIP: ICD-10-CM

## 2021-06-16 DIAGNOSIS — M25.551 RIGHT HIP PAIN: ICD-10-CM

## 2021-06-16 DIAGNOSIS — Z98.890 STATUS POST HIP SURGERY: Primary | ICD-10-CM

## 2021-06-16 DIAGNOSIS — M25.751 FEMOROACETABULAR IMPINGEMENT WITH OSTEOPHYTE OF RIGHT HIP: ICD-10-CM

## 2021-06-16 PROCEDURE — 99024 POSTOP FOLLOW-UP VISIT: CPT | Performed by: ORTHOPAEDIC SURGERY

## 2021-06-16 NOTE — LETTER
Physical Therapy Rehabilitation Referral    Patient Name: Casimiro Copeland MRN:   DOS: 6/16/2021     Diagnosis:   1. Status post hip surgery    2. 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  Right hip pain          Precautions:     [x] Evaluate and Treat    Post Op Instructions:  [] Continuous passive motion (CPM)   [] AAROM: Flexion to 90   [] Uni-planar passive range of motion   [] AAROM: External rotation to  10   [] Hip brace on at all times    [] AAROM: Extension to 10   [] Hip brace when hip at risk     [] AAROM:  Neutral IR   [] Home exercise program (copy to patient)   [] AAROM: Abduction to 25    [] Isometric external rotator strengthening    [] Isometric glute max strengthening     [] Isometric abductor strengthening              Post-op Phases:  []Phase I: Maximum Protection (Day 1-3 Weeks)  [x]Phase II: Mobility & Neuromuscular Retraining (3-6 Weeks)  []Phase III: Phase III: Muscle Balance and Strengthening (6-12 Weeks)  []Phase IV: Functional Training of the Hip & Lower Extremity (12-18 Weeks)  []Phase V: Advanced Training  Specificity for Return to Sport and/or Work (18-24 Snapshot Interactive)    Non-Operative & Pre-Operative Rehabilitation:    Cardiovascular:            Deep Hip Rotators  [] Upright Bike (Baseline)           [] External Rotators AROM in 4PK (Baseline)  [] Walking (Progression 1)           [] Internal Rotators AROM in 4PK (Baseline)  [] Running (Progression 2)           [] ER in side lying (Progression 1)  [] Dynamic Loading (Progression 3)          [] IR in side lying (Progression 1)                [] ER with resistance in 4PK (Progression 2)                [] IR with resistance in 4PK (Progression 2)                 [] Lateral Step Up (Progression 3)  Positioning:  [] 4PK with pelvic tilts (Baseline)  [] Pelvic tilts in sitting (Progression 1)        Core:   [] Relaxation Breathing (Baseline)         [] [place] lying elbow presses (Progression 1)  [] Side Planks (Baseline)

## 2021-06-16 NOTE — PROGRESS NOTES
History of Present Illness:  Rafia Trujillo is a pleasant 63 y.o. female who presents for a post operative visit. She is 2.5 weeks out following a right hip arthroscopy, acetabuloplasty, and labral repair. Overall She is in much less pain and takes the occasional naprosyn. She has been compliant with the 20lb flat foot weight bearing instructions and the hip abductor brace. She  Has been in physical therapy at our [facility] office with Renée Bautista. She has no pain at rest, but some pain with terminal flexion anteriorly. She denies fevers, chills, numbness, tingling, and shortness of breath. But noticed some clear drainage from the AL portal, and some redness. She is on day 6 of keflex which we prescribed over the phone once we saw a picture of her wound from Renéeele Bautista. Medical History:  Patient's medications, allergies, past medical, surgical, social and family histories were reviewed and updated as appropriate. No notes on file    Review of Systems  A 14 point review of systems was completed by the patient and is available in the media section of the scanned medical record and was reviewed on 6/16/2021. Vital Signs:  Vitals:    06/16/21 1022   Resp: 12       General/Appearance: Alert and oriented and in no apparent distress. Skin:  There are no skin lesions, cellulitis, or extreme edema. The patient has warm and well-perfused Bilateral lower  extremities with brisk capillary refill. Hip  Exam: Right    Inspection: Hip incision(s)  are clean, dry and intact and well approximated. The nylons were taken out. There redness to the AL portal and swelling, but no drainage or sign of infection. The MA portal looks healed. Palpation:  No crepitus to gentle motion / circumduction of the hip    Active Range of Motion: Deferred    Passive Range of Motion: 0 to 90 degrees of flexion. Strength:  Deferred    Special Tests:  Deferred.     Neurovascular: Sensation to light touch is intact, no motor deficits, palpable radial pulses 2+    Radiology:     No new xrays were taken today. Assessment :  Ms. Sundar Hernandez is a pleasant 63 y.o. patient who is 2.5 weeks post right hip arthroscopy, acetabuloplasty, labral repair. .        Impression:  Encounter Diagnoses   Name Primary? 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 Treatment Plan:    Overall Sundar Hernandez is doing well. I recommended she finish the entire naprosyn course for HO prophylaxis. And to finish her keflex. We gave her tegaderm for daily dressing changes. We recommend that She continue with physical therapy for timeline based progression of motion, weight bearing, and and strengthening, Phase II of the program.  The patient was told that she is restricted from driving for at least 4weeks postop. All of her  questions were fully answered today. We would like to see Sundar Hernandez back in 3 weeks for clinical follow-up. Sooner if any concerns. Sincerely,    Holly Young MD [facility] and [address] [place] [address]   [address], [address]  Email: [email or web address]  Office: [phone]    06/16/21  10:50 AM

## 2021-06-17 ENCOUNTER — HOSPITAL ENCOUNTER (OUTPATIENT)
Dept: PHYSICAL THERAPY | Age: 56
Setting detail: THERAPIES SERIES
Discharge: HOME | End: 2021-06-17
Payer: COMMERCIAL

## 2021-06-21 ENCOUNTER — HOSPITAL ENCOUNTER (OUTPATIENT)
Dept: PHYSICAL THERAPY | Age: 56
Setting detail: THERAPIES SERIES
Discharge: HOME | End: 2021-06-21
Payer: COMMERCIAL

## 2021-06-21 PROCEDURE — 97110 THERAPEUTIC EXERCISES: CPT

## 2021-06-21 PROCEDURE — 97112 NEUROMUSCULAR REEDUCATION: CPT

## 2021-06-21 PROCEDURE — 97140 MANUAL THERAPY 1/> REGIONS: CPT

## 2021-06-21 NOTE — FLOWSHEET NOTE
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  · Treatment Diagnosis: PT treatment diagnosis:  right hip pain  Q16.418  Insurance/Certification information:   [facility]  20 visits/yr,  $40 copay  Physician Information:  Referring Practitioner: Dr Sudeep Spangler of care signed (Y/N): Y    Date of Patient follow up with Physician:     Is this a Progress Report:     []  Yes  [x]  No        If Yes:  Date Range for reporting period:  Beginnin2021  Endin2021    Progress report will be due (10 Rx or 30 days whichever is less):  7012      Recertification will be due (POC Duration  / 90 days whichever is less):  90d        Visit # Insurance Allowable Auth Required    20 []  Yes [x]  No        Functional Scale:   LEFS: 0/80 100% LOF   Date assessed:        Latex Allergy:  [x]NO      []YES  Preferred Language for Healthcare:   [x]English       []other    Pain level:0/10     SUBJECTIVE:  Pt states she saw her surgeon Dr. Hawk Golden last week and has progressed to Phase II of the protocol. Pt had her sutures removed and feels that this has helped her incisional pain. Pt states she was sick last week and continues to have severe allergy problems.           OBJECTIVE:     Observation: increased redness around superior portion of incision/suture site, no active drainage noted (surgeon notified as a precaution)    ROM LEFT RIGHT   HIP Flex 116 105 deg (mild pain)   HIP Abd NT  Within restrictions    HIP Ext NT Able to get to approx 10 deg passively   HIP IR NT NT   HIP ER NT NT   Knee ext 0 0   Knee Flex 135 125   Strength Deferred due to recent sx LEFT RIGHT   HIP Flexors     HIP Abductors proprioception and motor activation to allow for proper function of core, proximal hip and LE with self care and ADLs  [x] (69951) Gait Re-education- Provided training and instruction to the patient for proper LE, core and proximal hip recruitment and positioning and eccentric body weight control with ambulation re-education including up and down stairs     Home Exercise Program:    [x] (03286) Reviewed/Progressed HEP activities related to strengthening, flexibility, endurance, ROM of core, proximal hip and LE for functional self-care, mobility, lifting and ambulation/stair navigation   [] (36159)Reviewed/Progressed HEP activities related to improving balance, coordination, kinesthetic sense, posture, motor skill, proprioception of core, proximal hip and LE for self care, mobility, lifting, and ambulation/stair navigation      Manual Treatments:  PROM / STM / Oscillations-Mobs:  G-I, II, III, IV (PA's, Inf., Post.)  [x] (19522) Provided manual therapy to mobilize LE, proximal hip and/or LS spine soft tissue/joints for the purpose of modulating pain, promoting relaxation,  increasing ROM, reducing/eliminating soft tissue swelling/inflammation/restriction, improving soft tissue extensibility and allowing for proper ROM for normal function with self care, mobility, lifting and ambulation. Modalities:  declined    Charges:  Timed Code Treatment Minutes: 45'   Total Treatment Minutes: 45     [] EVAL (LOW) 19919 (typically 20 minutes face-to-face)  [] EVAL (MOD) 70116 (typically 30 minutes face-to-face)  [] EVAL (HIGH) 96320 (typically 45 minutes face-to-face)  [] RE-EVAL     [x] QI(40957) x 1    [] IONTO  [x] NMR (27970) x1    [] VASO  [x] Manual (65024) x1     [] Other:  [] TA x     [] Mech Traction (59181)  [] ES(attended) (64999)      [] ES (un) (70458):     GOALS:  Updated 6/2  Short Term Goals: To be achieved in: 2 week  1. Independent in HEP and progression per patient tolerance, in order to prevent re-injury. [] Progressing: [] Met: [] Not Met: [] Adjusted   2. Patient will have a 50% or > decrease in pain at worse (R) hip in order for patient to progress per protocol in therapy   [] Progressing: [] Met: [] Not Met: [] Adjusted     Long Term Goals: To be achieved in: 12 weeks  1. Patient will demonstrate full functional AROM of the (R) hip in all planes to help normalize gait pattern and for improved performance of daily activities. [] Progressing: [] Met: [] Not Met: [] Adjusted   2. Patient will demonstrate a 50% or > improvement on her LEFS score for improved perceived functioning with daily activities. [] Progressing: [] Met: [] Not Met: [] Adjusted   3. Patient will demonstrate proximal hip strength of 4/5 or > for adequate hip stability and improved performance of normal daily/work activities. [] Progressing: [] Met: [] Not Met: [] Adjusted         Progression Towards Functional goals:  [x] Patient is progressing as expected towards functional goals listed. [] Progression is slowed due to complexities listed. [] Progression has been slowed due to co-morbidities. [] Plan just implemented, too soon to assess goals progression  [] Other:     ASSESSMENT:  Progressed to weight shifting and early balance/proprioceptive activities without issue. Pt's pain was significantly less irritable this date and pt was able to get to > 100 deg hip flexion passively with no issue. Progressed proximal hip strengthening exercises. Reviewed current restrictions for Phase II of protocol with pt.     Treatment/Activity Tolerance:  [] Patient tolerated treatment well [] Patient limited by fatique  [x] Patient limited by pain  [] Patient limited by other medical complications  [] Other:     Prognosis: [x] Good [] Fair  [] Poor    Patient Requires Follow-up: [x] Yes  [] No    PLAN: continued progression into phase II of program  [] Continue per plan of care [x] Alter current plan (see comments)  [] Plan of care initiated [] Hold pending MD visit [] Discharge      Electronically signed by: Carlos Molina PT, DPT       Note: If patient does not return for scheduled/ recommended follow up visits, this note will serve as a discharge from care along with most recent update on progress.

## 2021-06-23 ENCOUNTER — HOSPITAL ENCOUNTER (OUTPATIENT)
Dept: PHYSICAL THERAPY | Age: 56
Setting detail: THERAPIES SERIES
Discharge: HOME | End: 2021-06-23
Payer: COMMERCIAL

## 2021-06-23 PROCEDURE — 97110 THERAPEUTIC EXERCISES: CPT

## 2021-06-23 PROCEDURE — 97112 NEUROMUSCULAR REEDUCATION: CPT

## 2021-06-23 PROCEDURE — 97140 MANUAL THERAPY 1/> REGIONS: CPT

## 2021-06-23 NOTE — FLOWSHEET NOTE
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  · Treatment Diagnosis: PT treatment diagnosis:  right hip pain  P05.649  Insurance/Certification information:   [facility]  20 visits/yr,  $40 copay  Physician Information:  Referring Practitioner: Dr Nii Mehta of [facility] signed (Y/N): Y    Date of Patient follow up with Physician:     Is this a Progress Report:     []  Yes  [x]  No        If Yes:  Date Range for reporting period:  Beginnin2021  Endin2021    Progress report will be due (10 Rx or 30 days whichever is less):  5470      Recertification will be due (POC Duration  / 90 days whichever is less):  90d        Visit # Insurance Allowable Auth Required   6 20 []  Yes [x]  No        Functional Scale:   LEFS: 0/80 100% LOF   Date assessed:        Latex Allergy:  [x]NO      []YES  Preferred Language for Healthcare:   [x]English       []other    Pain level:0/10     SUBJECTIVE:  Pt states she continues to feel good. Pt states this \"worries\" her because she is afraid she will do something she should not. Pt is doing well with her current HEP. Pt will be 4 weeks this .     OBJECTIVE:     Observation: increased redness around superior portion of incision/suture site, no active drainage noted (surgeon notified as a precaution)    ROM LEFT RIGHT   HIP Flex 116 105 deg (mild pain)   HIP Abd NT  Within restrictions    HIP Ext NT Able to get to approx 10 deg passively   HIP IR NT NT   HIP ER NT NT   Knee ext 0 0   Knee Flex 135 125   Strength Deferred due to recent sx LEFT RIGHT   HIP Flexors     HIP Abductors     HIP Ext     Hip ER     Knee EXT (quad)     Knee Flex (HS) allow for proper function of core, proximal hip and LE with self care and ADLs  [x] (78964) Gait Re-education- Provided training and instruction to the patient for proper LE, core and proximal hip recruitment and positioning and eccentric body weight control with ambulation re-education including up and down stairs     Home Exercise Program:    [x] (86762) Reviewed/Progressed HEP activities related to strengthening, flexibility, endurance, ROM of core, proximal hip and LE for functional self-care, mobility, lifting and ambulation/stair navigation   [] (15916)Reviewed/Progressed HEP activities related to improving balance, coordination, kinesthetic sense, posture, motor skill, proprioception of core, proximal hip and LE for self care, mobility, lifting, and ambulation/stair navigation      Manual Treatments:  PROM / STM / Oscillations-Mobs:  G-I, II, III, IV (PA's, Inf., Post.)  [x] (65403) Provided manual therapy to mobilize LE, proximal hip and/or LS spine soft tissue/joints for the purpose of modulating pain, promoting relaxation,  increasing ROM, reducing/eliminating soft tissue swelling/inflammation/restriction, improving soft tissue extensibility and allowing for proper ROM for normal function with self care, mobility, lifting and ambulation. Modalities:  declined    Charges:  Timed Code Treatment Minutes: 43'   Total Treatment Minutes: 43'     [] EVAL (LOW) 68099 (typically 20 minutes face-to-face)  [] EVAL (MOD) 37851 (typically 30 minutes face-to-face)  [] EVAL (HIGH) 56217 (typically 45 minutes face-to-face)  [] RE-EVAL     [x] IK(46987) x 1    [] IONTO  [x] NMR (51273) x1    [] VASO  [x] Manual (01852) x1     [] Other:  [] TA x     [] Mech Traction (34597)  [] ES(attended) (65370)      [] ES (un) (63941):     GOALS:  Updated 6/2  Short Term Goals: To be achieved in: 2 week  1. Independent in HEP and progression per patient tolerance, in order to prevent re-injury.   [] Progressing: [] Met: [] Not Met: [] Adjusted   2. Patient will have a 50% or > decrease in pain at worse (R) hip in order for patient to progress per protocol in therapy   [] Progressing: [] Met: [] Not Met: [] Adjusted     Long Term Goals: To be achieved in: 12 weeks  1. Patient will demonstrate full functional AROM of the (R) hip in all planes to help normalize gait pattern and for improved performance of daily activities. [] Progressing: [] Met: [] Not Met: [] Adjusted   2. Patient will demonstrate a 50% or > improvement on her LEFS score for improved perceived functioning with daily activities. [] Progressing: [] Met: [] Not Met: [] Adjusted   3. Patient will demonstrate proximal hip strength of 4/5 or > for adequate hip stability and improved performance of normal daily/work activities. [] Progressing: [] Met: [] Not Met: [] Adjusted         Progression Towards Functional goals:  [x] Patient is progressing as expected towards functional goals listed. [] Progression is slowed due to complexities listed. [] Progression has been slowed due to co-morbidities. [] Plan just implemented, too soon to assess goals progression  [] Other:     ASSESSMENT:  Pt observed standing with full weight on (R) LE multiple times during transitions. Pt reminded of current WB restrictions and to utilize crutches/UE at all times to help maintain her safety. Otherwise, pt continues to do quite well at this time in regards to improving mobility and decreasing pain. Therapy continues to focus on early proprioception, mobility, and core activation within current protocol.   Treatment/Activity Tolerance:  [] Patient tolerated treatment well [] Patient limited by fatique  [x] Patient limited by pain  [] Patient limited by other medical complications  [] Other:     Prognosis: [x] Good [] Fair  [] Poor    Patient Requires Follow-up: [x] Yes  [] No    PLAN: continued progression into phase II of program  [] Continue per plan of care [x] Alter current plan (see comments)  [] Plan of care initiated [] Hold pending MD visit [] Discharge      Electronically signed by: Jere Gonzalez PT, DPT       Note: If patient does not return for scheduled/ recommended follow up visits, this note will serve as a discharge from care along with most recent update on progress.

## 2021-06-28 ENCOUNTER — HOSPITAL ENCOUNTER (OUTPATIENT)
Dept: PHYSICAL THERAPY | Age: 56
Setting detail: THERAPIES SERIES
Discharge: HOME | End: 2021-06-28
Payer: COMMERCIAL

## 2021-06-28 PROCEDURE — 97110 THERAPEUTIC EXERCISES: CPT

## 2021-06-28 PROCEDURE — 97140 MANUAL THERAPY 1/> REGIONS: CPT

## 2021-06-28 NOTE — FLOWSHEET NOTE
The 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  · Treatment Diagnosis: PT treatment diagnosis:  right hip pain  G79.911  Insurance/Certification information:   [facility]  20 visits/yr,  $40 copay  Physician Information:  Referring Practitioner: Dr Misha Palacios of care signed (Y/N): Y    Date of Patient follow up with Physician:     Is this a Progress Report:     []  Yes  [x]  No        If Yes:  Date Range for reporting period:  Beginnin2021  Endin2021    Progress report will be due (10 Rx or 30 days whichever is less):  3/8/1619      Recertification will be due (POC Duration  / 90 days whichever is less):  90d        Visit # Insurance Allowable Auth Required   7 20 []  Yes [x]  No        Functional Scale:   LEFS: 0/80 100% LOF   Date assessed:        Latex Allergy:  [x]NO      []YES  Preferred Language for Healthcare:   [x]English       []other    Pain level:0/10     SUBJECTIVE:  Pt states she bumped her (R) hip on a bed rail at home this past Saturday. Pt states that it did hit over her incisional area and created a small opening. Pt notes that she has had a small amount of bleeding since. Pt state the opening is a \"pin size. \" Pt notes a little irritation at this site, but otherwise she is feeling good. Pt states she does ambulate short distances at home without her crutches, mainly to her bathroom. OBJECTIVE:     Observation: incision covered by bandages.   No significant bleeding noted    ROM LEFT RIGHT   HIP Flex 116 110 deg (mild pain)   HIP Abd NT  Within restrictions    HIP Ext NT Able to get to approx 10 deg passively   HIP IR NT NT   HIP ER NT NT   Knee ext 0 0 (76529 or ) Provided verbal/tactile cueing for activities related to improving balance, coordination, kinesthetic sense, posture, motor skill, proprioception and motor activation to allow for proper function of core, proximal hip and LE with self care and ADLs  [x] (40951) Gait Re-education- Provided training and instruction to the patient for proper LE, core and proximal hip recruitment and positioning and eccentric body weight control with ambulation re-education including up and down stairs     Home Exercise Program:    [x] (12821) Reviewed/Progressed HEP activities related to strengthening, flexibility, endurance, ROM of core, proximal hip and LE for functional self-care, mobility, lifting and ambulation/stair navigation   [] (32997)Reviewed/Progressed HEP activities related to improving balance, coordination, kinesthetic sense, posture, motor skill, proprioception of core, proximal hip and LE for self care, mobility, lifting, and ambulation/stair navigation      Manual Treatments:  PROM / STM / Oscillations-Mobs:  G-I, II, III, IV (PA's, Inf., Post.)  [x] (12648) Provided manual therapy to mobilize LE, proximal hip and/or LS spine soft tissue/joints for the purpose of modulating pain, promoting relaxation,  increasing ROM, reducing/eliminating soft tissue swelling/inflammation/restriction, improving soft tissue extensibility and allowing for proper ROM for normal function with self care, mobility, lifting and ambulation.      Modalities:  declined    Charges:  Timed Code Treatment Minutes: 43'   Total Treatment Minutes: 43'     [] EVAL (LOW) 04901 (typically 20 minutes face-to-face)  [] EVAL (MOD) 11006 (typically 30 minutes face-to-face)  [] EVAL (HIGH) 23794 (typically 45 minutes face-to-face)  [] RE-EVAL     [x] TO(77301) x 2    [] IONTO  [] NMR (36281) x1    [] VASO  [x] Manual (92892) x1     [] Other:  [] TA x     [] Mech Traction (05244)  [] ES(attended) (16563)      [] ES (un) (65514):     GOALS: Updated 6/2  Short Term Goals: To be achieved in: 2 week  1. Independent in HEP and progression per patient tolerance, in order to prevent re-injury. [] Progressing: [] Met: [] Not Met: [] Adjusted   2. Patient will have a 50% or > decrease in pain at worse (R) hip in order for patient to progress per protocol in therapy   [] Progressing: [] Met: [] Not Met: [] Adjusted     Long Term Goals: To be achieved in: 12 weeks  1. Patient will demonstrate full functional AROM of the (R) hip in all planes to help normalize gait pattern and for improved performance of daily activities. [] Progressing: [] Met: [] Not Met: [] Adjusted   2. Patient will demonstrate a 50% or > improvement on her LEFS score for improved perceived functioning with daily activities. [] Progressing: [] Met: [] Not Met: [] Adjusted   3. Patient will demonstrate proximal hip strength of 4/5 or > for adequate hip stability and improved performance of normal daily/work activities. [] Progressing: [] Met: [] Not Met: [] Adjusted         Progression Towards Functional goals:  [x] Patient is progressing as expected towards functional goals listed. [] Progression is slowed due to complexities listed. [] Progression has been slowed due to co-morbidities. [] Plan just implemented, too soon to assess goals progression  [] Other:     ASSESSMENT:  Gains noted in hip flexion and pt demonstrates full (R) knee flexion at this time. Pt instructed to continue monitoring her incisional area closely. No issues with today's program. Pt's pain is well controlled. Re-educated pt on the need to adhere to post op weight bearing restrictions per protocol.    Treatment/Activity Tolerance:  [] Patient tolerated treatment well [] Patient limited by fatique  [x] Patient limited by pain  [] Patient limited by other medical complications  [] Other:     Prognosis: [x] Good [] Fair  [] Poor    Patient Requires Follow-up: [x] Yes  [] No    PLAN: Progress neuromuscular re-ed/balance  [] Continue per plan of care [x] Alter current plan (see comments)  [] Plan of care initiated [] Hold pending MD visit [] Discharge      Electronically signed by: Orin Pardo PT, DPT       Note: If patient does not return for scheduled/ recommended follow up visits, this note will serve as a discharge from care along with most recent update on progress.

## 2021-06-30 ENCOUNTER — APPOINTMENT (OUTPATIENT)
Dept: PHYSICAL THERAPY | Age: 56
End: 2021-06-30
Payer: COMMERCIAL

## 2021-06-30 ENCOUNTER — HOSPITAL ENCOUNTER (OUTPATIENT)
Dept: PHYSICAL THERAPY | Age: 56
Setting detail: THERAPIES SERIES
Discharge: HOME | End: 2021-06-30
Payer: COMMERCIAL

## 2021-06-30 NOTE — FLOWSHEET NOTE
[facility]    Physical Therapy  Cancellation/No-show Note  Patient Name:  Sandra Turner  :  1965   Date:  2021  Cancelled visits to date: 2  No-shows to date: 0    For today's appointment patient:  [x]  Cancelled  []  Rescheduled appointment  []  No-show     Reason given by patient:  [x]  Patient ill  []  Conflicting appointment  []  No transportation    []  Conflict with work  []  No reason given  []  Other:     Comments: Re-scheduled for Friday     Phone call information:   []  Phone call made today to patient at _ time at number provided:      []  Patient answered, conversation as follows:    []  Patient did not answer, message left as follows:  []  Phone call not made today  [x]  Phone call not needed - pt contacted us to cancel and provided reason for cancellation.      Electronically signed by:  Sosa Perry, PT, PT

## 2021-07-02 ENCOUNTER — HOSPITAL ENCOUNTER (OUTPATIENT)
Dept: PHYSICAL THERAPY | Age: 56
Setting detail: THERAPIES SERIES
Discharge: HOME | End: 2021-07-02
Payer: COMMERCIAL

## 2021-07-02 PROCEDURE — 97140 MANUAL THERAPY 1/> REGIONS: CPT

## 2021-07-02 PROCEDURE — 97110 THERAPEUTIC EXERCISES: CPT

## 2021-07-02 NOTE — FLOWSHEET NOTE
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  · Treatment Diagnosis: PT treatment diagnosis:  right hip pain  L01.536  Insurance/Certification information:   [facility]  20 visits/yr,  $40 copay  Physician Information:  Referring Practitioner: Dr Simeon Puente of care signed (Y/N): Y    Date of Patient follow up with Physician:     Is this a Progress Report:     []  Yes  [x]  No        If Yes:  Date Range for reporting period:  Beginnin2021  Endin2021    Progress report will be due (10 Rx or 30 days whichever is less):  3369      Recertification will be due (POC Duration  / 90 days whichever is less):  90d        Visit # Insurance Allowable Auth Required   7 20 []  Yes [x]  No        Functional Scale:   LEFS:54% LOF   Date assessed:       Latex Allergy:  [x]NO      []YES  Preferred Language for Healthcare:   [x]English       []other    Pain level:0/10     SUBJECTIVE:  Pt states she continues to feel good. Pt states this \"worries\" her because she is afraid she will do something she should not. Pt is doing well with her current HEP. Pt will be 4 weeks this .     OBJECTIVE:     Observation: increased redness around superior portion of incision/suture site, no active drainage noted (surgeon notified as a precaution)    ROM LEFT RIGHT   HIP Flex 116 108 deg (mild pain)   HIP Abd NT  Within restrictions    HIP Ext NT Able to get to approx 10 deg passively   HIP IR NT NT   HIP ER NT Within restrictions   Knee ext 0 0   Knee Flex 135 130   Strength Deferred due to recent sx LEFT RIGHT   HIP Flexors     HIP Abductors     HIP Ext     Hip ER     Knee EXT (quad)     Knee Flex (HS) Gait: TTWB with (B) axillary crutches At times, pt noted WB through her (R) LE prior to utilizing crutches (step happens just before crutches are on ground)1      RESTRICTIONS/PRECAUTIONS:     Exercises/Interventions:             Script-  Therapeutic Activity                              Neuromuscular Re-education x      Ta activation with gentle UE perturbations @ 90 deg 1x20\"    Gentle Weight Shifting within restrictions To (R) LE 3'    NBOS on airex UE support as needed maintaining WB restrictions 20\" x 3 Added 6/21             BKFO                 Therapeutic Exercise  Exercises x 25'     Add Iso 10x10''    Quad Sets 10x10''    Supine glut sets 10x10''    TA contraction 10x10''    Abd isometrics 5 x 10\" Added 6/7   LAQ 2x15 Added 6/10   Standing [address] 2x15 Added 6/10   Prone Laying for gentle hip flexor stretch/contracture prevention 3' Added 6/15   Mini Bridges on SB x12 Added 6/21 cueing for glut activation   Prone IR/ER manual isometrics 3\" x 10 ea Added 6/21   Upright Bike 5' warm up    Standing Hip Abd Hip Ext                       Manual 15'          Hip ROM/Circumduction Within current restrictions 10'    STM anterior hip/thigh 5'    Ankle Mobs/PROM Grade-               Therapeutic Exercise and NMR EXR  [x] (16389) Provided verbal/tactile cueing for activities related to strengthening, flexibility, endurance, ROM for improvements in LE, proximal hip, and core control with self care, mobility, lifting, ambulation.  [] (53949) Provided verbal/tactile cueing for activities related to improving balance, coordination, kinesthetic sense, posture, motor skill, proprioception  to assist with LE, proximal hip, and core control in self care, mobility, lifting, ambulation and eccentric single leg control.      NMR and Therapeutic Activities:    [] (77107 or 05690) Provided verbal/tactile cueing for activities related to improving balance, coordination, kinesthetic sense, posture, motor skill, proprioception and motor activation to allow for proper function of core, proximal hip and LE with self care and ADLs  [x] (80954) Gait Re-education- Provided training and instruction to the patient for proper LE, core and proximal hip recruitment and positioning and eccentric body weight control with ambulation re-education including up and down stairs     Home Exercise Program:    [x] (48571) Reviewed/Progressed HEP activities related to strengthening, flexibility, endurance, ROM of core, proximal hip and LE for functional self-care, mobility, lifting and ambulation/stair navigation   [] (10703)Reviewed/Progressed HEP activities related to improving balance, coordination, kinesthetic sense, posture, motor skill, proprioception of core, proximal hip and LE for self care, mobility, lifting, and ambulation/stair navigation      Manual Treatments:  PROM / STM / Oscillations-Mobs:  G-I, II, III, IV (PA's, Inf., Post.)  [x] (08491) Provided manual therapy to mobilize LE, proximal hip and/or LS spine soft tissue/joints for the purpose of modulating pain, promoting relaxation,  increasing ROM, reducing/eliminating soft tissue swelling/inflammation/restriction, improving soft tissue extensibility and allowing for proper ROM for normal function with self care, mobility, lifting and ambulation. Modalities:  declined    Charges:  Timed Code Treatment Minutes: 40'   Total Treatment Minutes: 36'     [] EVAL (LOW) 455 1011 (typically 20 minutes face-to-face)  [] EVAL (MOD) 15037 (typically 30 minutes face-to-face)  [] EVAL (HIGH) 11504 (typically 45 minutes face-to-face)  [] RE-EVAL     [x] WZ(40641) x 2  [] IONTO  [] NMR (10362) x1    [] VASO  [x] Manual (02335) x1     [] Other:  [] TA x     [] Mech Traction (57436)  [] ES(attended) (79615)      [] ES (un) (24572):     GOALS:  Updated 6/2  Short Term Goals: To be achieved in: 2 week  1. Independent in HEP and progression per patient tolerance, in order to prevent re-injury.   [] Progressing: [] Met: [] Not Met: [] Adjusted   2. Patient will have a 50% or > decrease in pain at worse (R) hip in order for patient to progress per protocol in therapy   [] Progressing: [] Met: [] Not Met: [] Adjusted     Long Term Goals: To be achieved in: 12 weeks  1. Patient will demonstrate full functional AROM of the (R) hip in all planes to help normalize gait pattern and for improved performance of daily activities. [] Progressing: [] Met: [] Not Met: [] Adjusted   2. Patient will demonstrate a 50% or > improvement on her LEFS score for improved perceived functioning with daily activities. [] Progressing: [] Met: [] Not Met: [] Adjusted   3. Patient will demonstrate proximal hip strength of 4/5 or > for adequate hip stability and improved performance of normal daily/work activities. [] Progressing: [] Met: [] Not Met: [] Adjusted         Progression Towards Functional goals:  [x] Patient is progressing as expected towards functional goals listed. [] Progression is slowed due to complexities listed. [] Progression has been slowed due to co-morbidities. [] Plan just implemented, too soon to assess goals progression  [] Other:     ASSESSMENT:  Overall, pt is doing well and demonstrates continued gains in her ROM per protocol. Therapy is currently focusing mainly on mobility restoration as well as early proprioceptive/gentle muscle activation per protocol. Pt returns to her referring surgeon next week and will be progressed accordingly. Pt's pain is well controlled at this time and pt does state she catches herself fully weightbearing when at home without issue.     Treatment/Activity Tolerance:  [] Patient tolerated treatment well [] Patient limited by fatique  [x] Patient limited by pain  [] Patient limited by other medical complications  [] Other:     Prognosis: [x] Good [] Fair  [] Poor    Patient Requires Follow-up: [x] Yes  [] No    PLAN: continued progression into phase II of program  [] Continue per plan of

## 2021-07-06 ENCOUNTER — HOSPITAL ENCOUNTER (OUTPATIENT)
Dept: PHYSICAL THERAPY | Age: 56
Setting detail: THERAPIES SERIES
Discharge: HOME | End: 2021-07-06
Payer: COMMERCIAL

## 2021-07-06 ENCOUNTER — TELEPHONE (OUTPATIENT)
Dept: ORTHOPEDIC SURGERY | Age: 56
End: 2021-07-06

## 2021-07-06 NOTE — FLOWSHEET NOTE
[facility]    Physical Therapy  Cancellation/No-show Note  Patient Name:  Grant Mckeon  :  1965   Date:  2021  Cancelled visits to date: 3  No-shows to date: 0    For today's appointment patient:  [x]  Cancelled  []  Rescheduled appointment  []  No-show     Reason given by patient:  []  Patient ill  []  Conflicting appointment  []  No transportation    []  Conflict with work  []  No reason given  [x]  Other:   Pt arrived 15 min late (pt called to give notice) and due to restricted number of visits per year per insurance, pt was cancelled for today to conserve visits. Pt returns to M.D. tomorrow and protocol will likely be updated. Comments:     Phone call information:   []  Phone call made today to patient at _ time at number provided:      []  Patient answered, conversation as follows:    []  Patient did not answer, message left as follows:  [x]  Phone call not made today  []  Phone call not needed - pt contacted us to cancel and provided reason for cancellation.      Electronically signed by:  Jerome Hand, PT, PT

## 2021-07-06 NOTE — TELEPHONE ENCOUNTER
SLIME JONES [facility] stopped by the . She would like another prescription for a disability parking placard. She did not fill the first one. Used boyfriends. Does feel like she still needs one. Thank you.

## 2021-07-07 ENCOUNTER — APPOINTMENT (OUTPATIENT)
Dept: PHYSICAL THERAPY | Age: 56
End: 2021-07-07
Payer: COMMERCIAL

## 2021-07-07 ENCOUNTER — OFFICE VISIT (OUTPATIENT)
Dept: ORTHOPEDIC SURGERY | Age: 56
End: 2021-07-07

## 2021-07-07 VITALS — HEIGHT: 61 IN | WEIGHT: 180 LBS | BODY MASS INDEX: 33.99 KG/M2

## 2021-07-07 DIAGNOSIS — M25.751 FEMOROACETABULAR IMPINGEMENT WITH OSTEOPHYTE OF RIGHT HIP: ICD-10-CM

## 2021-07-07 DIAGNOSIS — Z98.890 STATUS POST HIP SURGERY: Primary | ICD-10-CM

## 2021-07-07 DIAGNOSIS — M25.851 FEMOROACETABULAR IMPINGEMENT WITH OSTEOPHYTE OF RIGHT HIP: ICD-10-CM

## 2021-07-07 PROCEDURE — 99024 POSTOP FOLLOW-UP VISIT: CPT | Performed by: ORTHOPAEDIC SURGERY

## 2021-07-07 NOTE — LETTER
Physical Therapy Rehabilitation Referral    Patient Name: Cheng Mueller MRN:   DOS: 7/7/2021     Diagnosis:   1. Status post hip surgery    2. 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     Precautions:     [x] Evaluate and Treat and progress to phase 3 (muscle balance and strengthening)     Post Op Instructions:  [] Continuous passive motion (CPM)   [] AAROM: Flexion to 90   [] Uni-planar passive range of motion   [] AAROM: External rotation to  10   [] Hip brace on at all times    [] AAROM: Extension to 10   [] Hip brace when hip at risk     [] AAROM:  Neutral IR   [] Home exercise program (copy to patient)   [] AAROM: Abduction to 25    [] Isometric external rotator strengthening    [] Isometric glute max strengthening     [] Isometric abductor strengthening              Post-op Phases:  []Phase I: Maximum Protection (Day 1-3 Weeks)  []Phase II: Mobility & Neuromuscular Retraining (3-6 Weeks)  [x]Phase III: Phase III: Muscle Balance and Strengthening (6-12 Weeks)  []Phase IV: Functional Training of the Hip & Lower Extremity (12-18 Weeks)  []Phase V: Advanced Training  Specificity for Return to Sport and/or Work (18-24 [employer])    Non-Operative & Pre-Operative Rehabilitation:    Cardiovascular:            Deep Hip Rotators  [] Upright Bike (Baseline)           [] External Rotators AROM in 4PK (Baseline)  [] Walking (Progression 1)           [] Internal Rotators AROM in 4PK (Baseline)  [] Running (Progression 2)           [] ER in side lying (Progression 1)  [] Dynamic Loading (Progression 3)          [] IR in side lying (Progression 1)                [] ER with resistance in 4PK (Progression 2)                [] IR with resistance in 4PK (Progression 2)                 [] Lateral Step Up (Progression 3)  Positioning:  [] 4PK with pelvic tilts (Baseline)  [] Pelvic tilts in sitting (Progression 1)        Core:   [] Relaxation Breathing (Baseline)         [] [place] lying elbow presses (Progression 1)  [] Side Planks (Baseline)         [] Side planks + hip abduction (Progression 1)  [] Bird-Dog (Baseline)            [] Bird-Dog with resistance (Progression 1)    Glute Complex:            Load Transfer:  [] Bridging (Baseline)             [] Weight Shifting (Baseline)     [] Single Leg Bridge (Progression 1)         [] Single Leg Stance to SEBT(Progression 1)     [] Single Leg Lower (Progression 2)          [] Hip hinge + monster walks (Progression 2)       Mobility:  [] Fredy position with breathing (baseline)  [] Hip Hinge with stick & neutral spine (baseline)  [] Sit to stand (baseline)  [] Hip Hinge without guidance (Progression 1)  [] Hip Hinge with resisted punch out guidance (Progression 2)      Pelvic Floor:  [] Relaxation breathing         Activities:       [] Rowing       [] Stepper/Exercise bike     [] Swimming  [] Water exercises    Modalities:     Return to Sport:  [x] Of Choice      [] Plyometrics  [] Ultrasound     [] Rhythmic stabilization  [] Iontophoresis    [] Core strengthening   [] Moist heat     [] Sports specific program:   [] Massage         [x] Cryotherapy      [] Electrical stimulation     [] Paraffin  [] Whirlpool  [] TENS    [x] Home exercise program (copy to patient). Perform exercises for:   15     minutes    3      times/day  [x] Supervised physical therapy  Frequency: []  1x week  [x] 2x week  [] 3x week  [] Other:   Duration: [] 2 weeks   [] 4 weeks  [x] 6 weeks  [] Other:     Additional Instructions:       Sincerely,    Caridad Zavala MD [facility]  Hip Preservation & Sports Medicine Surgeon   [address] and [facility]   [address], [address]  Email: [email or web address]  Office: [phone]

## 2021-07-07 NOTE — PROGRESS NOTES
History of Present Illness:  Moisés Cruz is a pleasant 63 y.o. female who presents for a post operative visit. She is 5.5 weeks out following a right hip arthroscopy, acetabuloplasty, labral repair, and fractional psoas lengthening. Overall She is doing great and feels very minimal discomfort. She has no pain with walking or sitting, and is still wearing the brace for comfort. She has progress her motion and activity with Zeb Barajas at our [facility] office. She wears the brace for support, and has the occasional twinge with abduction external rotation. She denies fevers, chills, numbness, tingling, and shortness of breath. Medical History:  Patient's medications, allergies, past medical, surgical, social and family histories were reviewed and updated as appropriate. No notes on file    Review of Systems  A 14 point review of systems was completed by the patient and is available in the media section of the scanned medical record and was reviewed on 7/7/2021. Vital Signs: There were no vitals filed for this visit. General/Appearance: Alert and oriented and in no apparent distress. Skin:  There are no skin lesions, cellulitis, or extreme edema. The patient has warm and well-perfused Bilateral lower  extremities with brisk capillary refill. Hip  Exam: RIGHT    Inspection: Hip incision(s)  are almost completely healed. There is no erythema, drainage or other signs of infection    Palpation:  No crepitus to gentle motion / circumduction of the hip    Active Range of Motion: o to 90 deg flexion with normal active SLR and circumduction    Passive Range of Motion: 0 to 100 deg flexion with minimal pain    Strength:  Deferred    Special Tests:  Deferred. Neurovascular: Sensation to light touch is intact, no motor deficits, palpable radial pulses 2+    Radiology:     No new XR obtained at this time.          Assessment :  Ms. Moisés Cruz is a pleasant 63 y.o. patient who is a right hip arthroscopy, acetabuloplasty, labral repair, and fractional psoas lengthening. Impression:  Encounter Diagnoses   Name Primary? 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 Treatment Plan:    Overall Hodan Sharp is doing great and is following the expected trajectory of improvement in pain and hip motion post arthroscopic surgery. We recommend that She continue with physical therapy Phase III , and to transition out of the brace and crutches starting today. She can return to modified duty on the 24th of July, as per pre-op restrictions. At the 12 week annamarie, we will re-assess, and would be happy to support permanent modifications to her work duties if required, in order to avoid aggravation of her pre-existing hip condition (FORTINO morphology of the left, and post labral repair on the right). All of her questions were fully answered today. We would like to see Hodan Sharp back in 6 weeks for follow-up visit. Sincerely,    Florinda Bernal MD [facility] and [address] [place] [address]   [address] [place], [address]  Email: [email or web address]  Office: [phone]    07/07/21  4:21 PM

## 2021-07-07 NOTE — LETTER
[address]  [address] [facility] [address]  Phone: [phone]  Fax: [phone]    Daniel Tinajero MD        July 7, 2021     Patient: Grant Mckeon   YOB: 1965   Date of Visit: 7/7/2021       To Whom It May Concern: It is my medical opinion that Nereida Guzman may return to work on  7/8/21with the following restrictions for the next 6 weeks: avoid excessive walking or standing and sitting. Light lifting  If you have any questions or concerns, please don't hesitate to lynne .    If you have any questions or concerns, please don't hesitate to call.     Sincerely,        Daniel Tinajero MD

## 2021-07-07 NOTE — LETTER
[address]  [address] [facility] [address]  Phone: [phone]  Fax: [phone]    Noah Richardson MD         July 7, 2021     Patient: Marianne Dunn   YOB: 1965   Date of Visit: 7/7/2021       To Whom It May Concern: It is my medical opinion that Krysten Salazar requires a disability parking placard for the following reasons:  She cannot walk without assistance from another person or the use of an assistance device (cane, crutch, prosthetic device, wheelchair, etc.). Duration of need: 3 months    If you have any questions or concerns, please don't hesitate to call.     Sincerely,        Noah Richardson MD

## 2021-07-08 ENCOUNTER — HOSPITAL ENCOUNTER (OUTPATIENT)
Dept: PHYSICAL THERAPY | Age: 56
Setting detail: THERAPIES SERIES
Discharge: HOME | End: 2021-07-08
Payer: COMMERCIAL

## 2021-07-08 PROCEDURE — 97140 MANUAL THERAPY 1/> REGIONS: CPT

## 2021-07-08 PROCEDURE — 97110 THERAPEUTIC EXERCISES: CPT

## 2021-07-08 PROCEDURE — 97112 NEUROMUSCULAR REEDUCATION: CPT

## 2021-07-08 NOTE — FLOWSHEET NOTE
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  · Treatment Diagnosis: PT treatment diagnosis:  right hip pain  D76.744  Insurance/Certification information:   [facility]  20 visits/yr,  $40 copay  Physician Information:  Referring Practitioner: Dr Olivia Loja of care signed (Y/N): Y    Date of Patient follow up with Physician:     Is this a Progress Report:     []  Yes  [x]  No        If Yes:  Date Range for reporting period:  Beginnin2021  Endin2021    Progress report will be due (10 Rx or 30 days whichever is less):  698      Recertification will be due (POC Duration  / 90 days whichever is less):  90d        Visit # Insurance Allowable Auth Required   8 20 []  Yes [x]  No        Functional Scale:   LEFS:54% LOF   Date assessed:       Latex Allergy:  [x]NO      []YES  Preferred Language for Healthcare:   [x]English       []other    Pain level:0/10     SUBJECTIVE:  Pt saw her M.D. yesterday and has been progressed to [facility] and can transition away from her crutches. Pt's brace has been discontinued and pt has been progressed to phase III of protocol to include more strengthening. OBJECTIVE: 2021    Observation:     ROM LEFT RIGHT   HIP Flex 116 108 deg (mild pain)   HIP Abd NT  Within restrictions    HIP Ext NT Able to get to approx 10 deg passively   HIP IR NT NT   HIP ER NT Within restrictions   Knee ext 0 0   Knee Flex 135 130   Strength Deferred due to recent sx LEFT RIGHT   HIP Flexors     HIP Abductors     HIP Ext     Hip ER     Knee EXT (quad)     Knee Flex (HS)           Gait: WBAT with one crutch.   Struggles to get good heel strike (habit more then actual deficit),      RESTRICTIONS/PRECAUTIONS:     Exercises/Interventions:             Script-  Therapeutic Activity                              Neuromuscular Re-education x 27     Ta activation with gentle UE perturbations @ 90 deg 1x20\"    Gentle Weight Shifting within restrictions To (R) LE 3'    NBOS on airex UE support as needed maintaining WB restrictions 20\" x 3 Added 6/21   TRX Squats to 90 deg 2x10 Added 7/8 cueing for hip hinge, glut activation   Mini Bosu Ball Lunge Iso With manual perturbations for Hip ER activation   5\" x 12 reps Added 7/8 focus on ER activation   Quadruped Alt LE x 15 ea Added 7/8 cueing for trunk control, minimizing compensation   Gait Training One crutch and no crutches X 10'                              Therapeutic Exercise  Exercises x 15'     TA contraction 10x10''    Supine Bridges 2 x 10 Normal bridges 7/8   Prone IR/ER manual isometrics 3\" x 10 ea Added 6/21   Upright Bike 5' warm up    MH Hip Abduction 45lbs x 12 ea Added 7/8                                              Manual           Hip ROM/Circumduction Within current restrictions 10' Resume NPV   STM anterior hip/thigh 5'    Ankle Mobs/PROM Grade-         Access Code: [email or web address]  URL: [email or web address]  Date: 07/08/2021  Prepared by: Evangelist Scott    Exercises  Supine Quadricep Sets - 2 x daily - 7 x weekly - 1 sets - 10 reps - 10 hold  Supine Transversus Abdominis Bracing with Heel Slide - 1 x daily - 7 x weekly - 1 sets - 10 reps - 10 hold  Supine Straight Leg Hip Adduction and Quad Set with Ball - 2 x daily - 7 x weekly - 1 sets - 10 reps - 10 hold  Supine Bridge - 1 x daily - 4-5 x weekly - 3 sets - 10 reps - 3 hold  Quadruped Alternating Leg Extensions - 1 x daily - 4-5 x weekly - 2 sets - 10 reps        Therapeutic Exercise and NMR EXR  [x] (93267) Provided verbal/tactile cueing for activities related to strengthening, flexibility, endurance, ROM for improvements in LE, proximal hip, and core control with self care, mobility, lifting, ambulation.  [] (40183) Provided verbal/tactile cueing for activities related to improving balance, coordination, kinesthetic sense, posture, motor skill, proprioception  to assist with LE, proximal hip, and core control in self care, mobility, lifting, ambulation and eccentric single leg control. NMR and Therapeutic Activities:    [] (14586 or 41123) Provided verbal/tactile cueing for activities related to improving balance, coordination, kinesthetic sense, posture, motor skill, proprioception and motor activation to allow for proper function of core, proximal hip and LE with self care and ADLs  [x] (59282) Gait Re-education- Provided training and instruction to the patient for proper LE, core and proximal hip recruitment and positioning and eccentric body weight control with ambulation re-education including up and down stairs     Home Exercise Program:    [x] (81972) Reviewed/Progressed HEP activities related to strengthening, flexibility, endurance, ROM of core, proximal hip and LE for functional self-care, mobility, lifting and ambulation/stair navigation   [] (17578)Reviewed/Progressed HEP activities related to improving balance, coordination, kinesthetic sense, posture, motor skill, proprioception of core, proximal hip and LE for self care, mobility, lifting, and ambulation/stair navigation      Manual Treatments:  PROM / STM / Oscillations-Mobs:  G-I, II, III, IV (PA's, Inf., Post.)  [x] (78531) Provided manual therapy to mobilize LE, proximal hip and/or LS spine soft tissue/joints for the purpose of modulating pain, promoting relaxation,  increasing ROM, reducing/eliminating soft tissue swelling/inflammation/restriction, improving soft tissue extensibility and allowing for proper ROM for normal function with self care, mobility, lifting and ambulation.      Modalities:  declined    Charges:  Timed Code Treatment Minutes: 43'   Total Treatment Minutes: 43'     [] ANDRIY (LOW) 81802 (typically 20 minutes face-to-face)  [] EVAL (MOD) 98603 (typically 30 minutes face-to-face)  [] EVAL (HIGH) 13517 (typically 45 minutes face-to-face)  [] RE-EVAL     [x] JI(55263) x 2  [] IONTO  [] NMR (25259) x1    [] VASO  [x] Manual (67302) x1     [] Other:  [] TA x     [] Mech Traction (02694)  [] ES(attended) (76050)      [] ES (un) (52722):     GOALS:  Updated 6/2  Short Term Goals: To be achieved in: 2 week  1. Independent in HEP and progression per patient tolerance, in order to prevent re-injury. [] Progressing: [] Met: [] Not Met: [] Adjusted   2. Patient will have a 50% or > decrease in pain at worse (R) hip in order for patient to progress per protocol in therapy   [] Progressing: [] Met: [] Not Met: [] Adjusted     Long Term Goals: To be achieved in: 12 weeks  1. Patient will demonstrate full functional AROM of the (R) hip in all planes to help normalize gait pattern and for improved performance of daily activities. [] Progressing: [] Met: [] Not Met: [] Adjusted   2. Patient will demonstrate a 50% or > improvement on her LEFS score for improved perceived functioning with daily activities. [] Progressing: [] Met: [] Not Met: [] Adjusted   3. Patient will demonstrate proximal hip strength of 4/5 or > for adequate hip stability and improved performance of normal daily/work activities. [] Progressing: [] Met: [] Not Met: [] Adjusted         Progression Towards Functional goals:  [x] Patient is progressing as expected towards functional goals listed. [] Progression is slowed due to complexities listed. [] Progression has been slowed due to co-morbidities. [] Plan just implemented, too soon to assess goals progression  [] Other:     ASSESSMENT:  Instructed pt to ambulate around her house without crutches per comfort, but to utilize one crutch on uneven terrain or for prolonged spells outside her home. PT will be transitioned completely away from crutches within the next 1-2 weeks as able. Pt did not some increased lateral hip and groin pain, but felt it was more soreness. Educated pt that she is now loading her hip more then she has in weeks and that it is normal to experience some mild soreness. However, pt to utilize ice as needed and to utilize a crutch/her brace temporarily as needed to prevent excessive discomfort. Progressed pt into strengthening phase this date to continue building proximal hip, core, and general LE strength. Treatment/Activity Tolerance:  [x] Patient tolerated treatment well [] Patient limited by fatique  [] Patient limited by pain  [] Patient limited by other medical complications  [] Other:     Prognosis: [x] Good [] Fair  [] Poor    Patient Requires Follow-up: [x] Yes  [] No    PLAN: continued progression into phase III of program  [] Continue per plan of care [x] Alter current plan (see comments)  [] Plan of care initiated [] Hold pending MD visit [] Discharge      Electronically signed by: Mary Beth De Santiago PT, DPT       Note: If patient does not return for scheduled/ recommended follow up visits, this note will serve as a discharge from care along with most recent update on progress.

## 2021-07-13 ENCOUNTER — HOSPITAL ENCOUNTER (OUTPATIENT)
Dept: PHYSICAL THERAPY | Age: 56
Setting detail: THERAPIES SERIES
Discharge: HOME | End: 2021-07-13
Payer: COMMERCIAL

## 2021-07-13 PROCEDURE — 97110 THERAPEUTIC EXERCISES: CPT

## 2021-07-13 PROCEDURE — 97112 NEUROMUSCULAR REEDUCATION: CPT

## 2021-07-13 NOTE — FLOWSHEET NOTE
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  · Treatment Diagnosis: PT treatment diagnosis:  right hip pain  I56.740  Insurance/Certification information:   [facility]  20 visits/yr,  $40 copay  Physician Information:  Referring Practitioner: Dr Ancelmo Bocanegra of care signed (Y/N): Y    Date of Patient follow up with Physician:     Is this a Progress Report:     []  Yes  [x]  No        If Yes:  Date Range for reporting period:  Beginnin2021  Endin2021    Progress report will be due (10 Rx or 30 days whichever is less):  3/6/5835      Recertification will be due (POC Duration  / 90 days whichever is less):  90d        Visit # Insurance Allowable Auth Required   9 20 []  Yes [x]  No        Functional Scale:   LEFS:54% LOF   Date assessed:       Latex Allergy:  [x]NO      []YES  Preferred Language for Healthcare:   [x]English       []other    Pain level:0/10     SUBJECTIVE:  Pt reports she is experiencing some muscle soreness following sessions. Patient states this is due to using muscles. Patient states function is improving. OBJECTIVE: 2021    Observation:     ROM LEFT RIGHT   HIP Flex 116 108 deg (mild pain)   HIP Abd NT  Within restrictions    HIP Ext NT Able to get to approx 10 deg passively   HIP IR NT NT   HIP ER NT Within restrictions   Knee ext 0 0   Knee Flex 135 130   Strength Deferred due to recent sx LEFT RIGHT   HIP Flexors     HIP Abductors     HIP Ext     Hip ER     Knee EXT (quad)     Knee Flex (HS)           Gait: WBAT with one crutch.   Struggles to get good heel strike (habit more then actual deficit),      RESTRICTIONS/PRECAUTIONS:     Exercises/Interventions: Script-  Therapeutic Activity                              Neuromuscular Re-education x 25     Ta activation with gentle UE perturbations @ 90 deg 1x20\"    Gentle Weight Shifting within restrictions To (R) LE 3'    NBOS on airex UE support as needed maintaining WB restrictions 20\" x 3 Added 6/21   TRX Squats to 90 deg 2x10 Added 7/8 cueing for hip hinge, glut activation   Mini Bosu Ball Lunge Iso With manual perturbations for Hip ER activation   5\" x 12 reps Added 7/8 focus on ER activation   SLS on airex 3 reps to fatigue  7/13/21   Quadruped Alt LE 2x 15 ea Added 7/8 cueing for trunk control, minimizing compensation   Gait Training One crutch and no crutches X 5'                              Therapeutic Exercise  Exercises x 20'     TA contraction 10x10''    Supine Bridges w/ add squeeze 2 x 10 Normal bridges 7/8   Prone IR/ER manual isometrics 3\" x 10 ea Added 6/21   Upright Bike 5' warm up    MH Hip Abduction 45lbs x 12 ea Added 7/8   Wall sit 3x to fatigue  7/13   Lateral step down 4\" 2 x10                                     Manual           Hip ROM/Circumduction Within current restrictions 10' Resume NPV   STM anterior hip/thigh 5'    Ankle Mobs/PROM Grade-         Access Code: [email or web address]  URL: [email or web address]  Date: 07/08/2021  Prepared by: Thais Wilson    Exercises  Supine Quadricep Sets - 2 x daily - 7 x weekly - 1 sets - 10 reps - 10 hold  Supine Transversus Abdominis Bracing with Heel Slide - 1 x daily - 7 x weekly - 1 sets - 10 reps - 10 hold  Supine Straight Leg Hip Adduction and Quad Set with Ball - 2 x daily - 7 x weekly - 1 sets - 10 reps - 10 hold  Supine Bridge - 1 x daily - 4-5 x weekly - 3 sets - 10 reps - 3 hold  Quadruped Alternating Leg Extensions - 1 x daily - 4-5 x weekly - 2 sets - 10 reps        Therapeutic Exercise and NMR EXR  [x] (30421) Provided verbal/tactile cueing for activities related to strengthening, flexibility, endurance, ROM for improvements in LE, proximal hip, and core control with self care, mobility, lifting, ambulation.  [] (35325) Provided verbal/tactile cueing for activities related to improving balance, coordination, kinesthetic sense, posture, motor skill, proprioception  to assist with LE, proximal hip, and core control in self care, mobility, lifting, ambulation and eccentric single leg control. NMR and Therapeutic Activities:    [] (23010 or 39087) Provided verbal/tactile cueing for activities related to improving balance, coordination, kinesthetic sense, posture, motor skill, proprioception and motor activation to allow for proper function of core, proximal hip and LE with self care and ADLs  [x] (64198) Gait Re-education- Provided training and instruction to the patient for proper LE, core and proximal hip recruitment and positioning and eccentric body weight control with ambulation re-education including up and down stairs     Home Exercise Program:    [x] (98210) Reviewed/Progressed HEP activities related to strengthening, flexibility, endurance, ROM of core, proximal hip and LE for functional self-care, mobility, lifting and ambulation/stair navigation   [] (16619)Reviewed/Progressed HEP activities related to improving balance, coordination, kinesthetic sense, posture, motor skill, proprioception of core, proximal hip and LE for self care, mobility, lifting, and ambulation/stair navigation      Manual Treatments:  PROM / STM / Oscillations-Mobs:  G-I, II, III, IV (PA's, Inf., Post.)  [x] (34656) Provided manual therapy to mobilize LE, proximal hip and/or LS spine soft tissue/joints for the purpose of modulating pain, promoting relaxation,  increasing ROM, reducing/eliminating soft tissue swelling/inflammation/restriction, improving soft tissue extensibility and allowing for proper ROM for normal function with self care, mobility, lifting and ambulation.      Modalities:  declined    Charges:  Timed Code Treatment Minutes: 39'   Total Treatment Minutes: 39'     [] EVAL (LOW) 40577 (typically 20 minutes face-to-face)  [] EVAL (MOD) 07840 (typically 30 minutes face-to-face)  [] EVAL (HIGH) 78805 (typically 45 minutes face-to-face)  [] RE-EVAL     [x] YD(92774) x 1  [] IONTO  [x] NMR (45054) x2    [] VASO  [] Manual (05798) x     [] Other:  [] TA x     [] Mech Traction (06254)  [] ES(attended) (51875)      [] ES (un) (46906):     GOALS:  Updated 6/2  Short Term Goals: To be achieved in: 2 week  1. Independent in HEP and progression per patient tolerance, in order to prevent re-injury. [] Progressing: [] Met: [] Not Met: [] Adjusted   2. Patient will have a 50% or > decrease in pain at worse (R) hip in order for patient to progress per protocol in therapy   [] Progressing: [] Met: [] Not Met: [] Adjusted     Long Term Goals: To be achieved in: 12 weeks  1. Patient will demonstrate full functional AROM of the (R) hip in all planes to help normalize gait pattern and for improved performance of daily activities. [] Progressing: [] Met: [] Not Met: [] Adjusted   2. Patient will demonstrate a 50% or > improvement on her LEFS score for improved perceived functioning with daily activities. [] Progressing: [] Met: [] Not Met: [] Adjusted   3. Patient will demonstrate proximal hip strength of 4/5 or > for adequate hip stability and improved performance of normal daily/work activities. [] Progressing: [] Met: [] Not Met: [] Adjusted         Progression Towards Functional goals:  [x] Patient is progressing as expected towards functional goals listed. [] Progression is slowed due to complexities listed. [] Progression has been slowed due to co-morbidities. [] Plan just implemented, too soon to assess goals progression  [] Other:     ASSESSMENT:  Patient tolerated session well with new exercise. Patient reports she feels her strength is improving. Patient educated on single crutch gait with emphasis on heel toe walking.  Patient encouraged to continue with HEP with walking without AD while at home. Treatment/Activity Tolerance:  [x] Patient tolerated treatment well [] Patient limited by fatique  [] Patient limited by pain  [] Patient limited by other medical complications  [] Other:     Prognosis: [x] Good [] Fair  [] Poor    Patient Requires Follow-up: [x] Yes  [] No    PLAN: continued progression into phase III of program  [x] Continue per plan of care [x] Alter current plan (see comments)  [] Plan of care initiated [] Hold pending MD visit [] Discharge      Electronically signed by: Eamon Archuleta PT, DPT       Note: If patient does not return for scheduled/ recommended follow up visits, this note will serve as a discharge from care along with most recent update on progress.

## 2021-07-15 ENCOUNTER — HOSPITAL ENCOUNTER (OUTPATIENT)
Dept: PHYSICAL THERAPY | Age: 56
Setting detail: THERAPIES SERIES
Discharge: HOME | End: 2021-07-15
Payer: COMMERCIAL

## 2021-07-15 PROCEDURE — 97110 THERAPEUTIC EXERCISES: CPT

## 2021-07-15 PROCEDURE — 97112 NEUROMUSCULAR REEDUCATION: CPT

## 2021-07-15 NOTE — FLOWSHEET NOTE
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  · Treatment Diagnosis: PT treatment diagnosis:  right hip pain  V77.236  Insurance/Certification information:   [facility]  20 visits/yr,  $40 copay  Physician Information:  Referring Practitioner: Dr Layne Larson of care signed (Y/N): Y    Date of Patient follow up with Physician:     Is this a Progress Report:     []  Yes  [x]  No        If Yes:  Date Range for reporting period:  Beginnin2021  Endin2021    Progress report will be due (10 Rx or 30 days whichever is less):  9366      Recertification will be due (POC Duration  / 90 days whichever is less):  90d        Visit # Insurance Allowable Auth Required   10 20 []  Yes [x]  No        Functional Scale:   LEFS:54% LOF   Date assessed:       Latex Allergy:  [x]NO      []YES  Preferred Language for Healthcare:   [x]English       []other    Pain level:0/10     SUBJECTIVE:  Pt reports she is experiencing significantly increased R hip and low back soreness form last session. Patient returns to work on 21. Patient reports work place is letting her perform light duty. OBJECTIVE: 2021    Observation:     ROM LEFT RIGHT   HIP Flex 116 110 deg (mild pain)   HIP Abd NT  Within restrictions    HIP Ext NT Able to get to approx 10 deg passively   HIP IR NT NT   HIP ER NT Within restrictions   Knee ext 0 0   Knee Flex 135 130   Strength Deferred due to recent sx LEFT RIGHT   HIP Flexors     HIP Abductors     HIP Ext     Hip ER     Knee EXT (quad)     Knee Flex (HS)           Gait: WBAT with one crutch.   Struggles to get good heel strike (habit more then actual deficit),      RESTRICTIONS/PRECAUTIONS:     Exercises/Interventions:             Script-  Therapeutic Activity                              Neuromuscular Re-education x 10     Ta activation with gentle UE perturbations @ 90 deg 1x20\"    Gentle Weight Shifting within restrictions To (R) LE 3'    NBOS on airex UE support as needed maintaining WB restrictions 20\" x 3 Added 6/21   TRX Squats to 90 deg 2x10 Added 7/8 cueing for hip hinge, glut activation   Mini Bosu Ball Lunge Iso With manual perturbations for Hip ER activation   5\" x 12 reps Added 7/8 focus on ER activation   SLS on airex 3 reps to fatigue  7/13/21   Quadruped Alt LE 2x 15 ea Added 7/8 cueing for trunk control, minimizing compensation   Gait Training One crutch and no crutches X 5'                              Therapeutic Exercise  Exercises x 35'     TA contraction 10x10''    Supine Bridges w/ add squeeze 2 x 10 Normal bridges 7/8   Prone IR/ER manual isometrics 3\" x 10 ea Added 6/21   Upright Bike 5' warm up    MH Hip Abduction 30lbs x 15 ea Added 7/8   Wall sit 4x to fatigue  7/13   Lateral step down 4\" 2 x10 7/13   Supine lumbar rotation 1x 8, 5 sec hold For low back pain soreness   Yoga ball lumbar strech 3x 30 sec Improved low back soreness                          Manual           Hip ROM/Circumduction Within current restrictions 10' Resume NPV   STM anterior hip/thigh 5'    Ankle Mobs/PROM Grade-         Access Code: [email or web address]  URL: [email or web address]  Date: 07/08/2021  Prepared by: Marco A Mccray    Exercises  Supine Quadricep Sets - 2 x daily - 7 x weekly - 1 sets - 10 reps - 10 hold  Supine Transversus Abdominis Bracing with Heel Slide - 1 x daily - 7 x weekly - 1 sets - 10 reps - 10 hold  Supine Straight Leg Hip Adduction and Quad Set with Ball - 2 x daily - 7 x weekly - 1 sets - 10 reps - 10 hold  Supine Bridge - 1 x daily - 4-5 x weekly - 3 sets - 10 reps - 3 hold  Quadruped Alternating Leg Extensions - 1 x daily - 4-5 x weekly - 2 sets - 10 reps        Therapeutic Exercise and NMR EXR  [x] (66877) Provided verbal/tactile cueing for activities related to strengthening, flexibility, endurance, ROM for improvements in LE, proximal hip, and core control with self care, mobility, lifting, ambulation.  [] (21726) Provided verbal/tactile cueing for activities related to improving balance, coordination, kinesthetic sense, posture, motor skill, proprioception  to assist with LE, proximal hip, and core control in self care, mobility, lifting, ambulation and eccentric single leg control.      NMR and Therapeutic Activities:    [] (39106 or 12094) Provided verbal/tactile cueing for activities related to improving balance, coordination, kinesthetic sense, posture, motor skill, proprioception and motor activation to allow for proper function of core, proximal hip and LE with self care and ADLs  [x] (53444) Gait Re-education- Provided training and instruction to the patient for proper LE, core and proximal hip recruitment and positioning and eccentric body weight control with ambulation re-education including up and down stairs     Home Exercise Program:    [x] (08609) Reviewed/Progressed HEP activities related to strengthening, flexibility, endurance, ROM of core, proximal hip and LE for functional self-care, mobility, lifting and ambulation/stair navigation   [] (42575)Reviewed/Progressed HEP activities related to improving balance, coordination, kinesthetic sense, posture, motor skill, proprioception of core, proximal hip and LE for self care, mobility, lifting, and ambulation/stair navigation      Manual Treatments:  PROM / STM / Oscillations-Mobs:  G-I, II, III, IV (PA's, Inf., Post.)  [x] (89996) Provided manual therapy to mobilize LE, proximal hip and/or LS spine soft tissue/joints for the purpose of modulating pain, promoting relaxation,  increasing ROM, reducing/eliminating soft tissue swelling/inflammation/restriction, improving soft tissue tolerated session well while limited by muscle soreness. Patient educated on muscle soreness following treatment and self-management strategies. Patient reports no increase in soreness at end of session. Patient encouraged to continue with HEP and pain-free activities. Treatment/Activity Tolerance:  [] Patient tolerated treatment well [] Patient limited by fatique  [x] Patient limited by pain  [] Patient limited by other medical complications  [] Other:     Prognosis: [x] Good [] Fair  [] Poor    Patient Requires Follow-up: [x] Yes  [] No    PLAN: continued progression into phase III of program  [x] Continue per plan of care [x] Alter current plan (see comments)  [] Plan of care initiated [] Hold pending MD visit [] Discharge      Electronically signed by: Mary Covarrubias PT, DPT       Note: If patient does not return for scheduled/ recommended follow up visits, this note will serve as a discharge from care along with most recent update on progress.

## 2021-07-20 ENCOUNTER — HOSPITAL ENCOUNTER (OUTPATIENT)
Dept: PHYSICAL THERAPY | Age: 56
Setting detail: THERAPIES SERIES
Discharge: HOME | End: 2021-07-20
Payer: COMMERCIAL

## 2021-07-20 PROCEDURE — 97110 THERAPEUTIC EXERCISES: CPT

## 2021-07-20 PROCEDURE — 97112 NEUROMUSCULAR REEDUCATION: CPT

## 2021-07-20 PROCEDURE — 97140 MANUAL THERAPY 1/> REGIONS: CPT

## 2021-07-20 NOTE — FLOWSHEET NOTE
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  · Treatment Diagnosis: PT treatment diagnosis:  right hip pain  G65.965  Insurance/Certification information:   [facility]  20 visits/yr,  $40 copay  Physician Information:  Referring Practitioner: Dr Ya Davidson of care signed (Y/N): Y    Date of Patient follow up with Physician:     Is this a Progress Report:     []  Yes  [x]  No        If Yes:  Date Range for reporting period:  Beginnin2021  Endin2021    Progress report will be due (10 Rx or 30 days whichever is less):  8/3/4497      Recertification will be due (POC Duration  / 90 days whichever is less):  90d        Visit # Insurance Allowable Auth Required   11 (14 total) 20 []  Yes [x]  No        Functional Scale:   LEFS:54% LOF   Date assessed:       Latex Allergy:  [x]NO      []YES  Preferred Language for Healthcare:   [x]English       []other    Pain level:1-2/10     SUBJECTIVE:  Pt notes (R) hip pain is better. Pt notes she notes a significant loss of endurance. Pt is now 7.5 weeks post op    OBJECTIVE: 2021    Observation:     ROM LEFT RIGHT   HIP Flex 116 112 deg (mild pain)   HIP Abd NT  38 deg    HIP Ext NT Able to get to approx 10 deg passively   HIP IR NT NT   HIP ER NT 47 deg   Knee ext 0 0   Knee Flex 135 130   Strength Deferred due to recent sx LEFT RIGHT   HIP Flexors     HIP Abductors     HIP Ext     Hip ER     Knee EXT (quad)     Knee Flex (HS)           Gait: WBAT with one crutch.   Struggles to get good heel strike (habit more then actual deficit),      RESTRICTIONS/PRECAUTIONS:     Exercises/Interventions:             Script-  Therapeutic Activity Neuromuscular Re-education x 10     Ta activation with gentle UE perturbations @ 90 deg 1x20\"    Gentle Weight Shifting within restrictions To (R) LE 3'    NBOS on airex UE support as needed maintaining WB restrictions 20\" x 3 Added 6/21   TRX Squats to 90 deg 2x10 Added 7/8 cueing for hip hinge, glut activation   Mini Bosu Ball Lunge Iso With manual perturbations for Hip ER activation   5\" x 12 reps Added 7/8 focus on ER activation   SLS on airex 3 reps to fatigue  7/13/21   Quadruped Alt LE 2x 15 ea Added 7/8 cueing for trunk control, minimizing compensation   Gait Training One crutch and no crutches X 5'   Standing Anti-Rotation Red 5\" x 10 ea side Added 7/20   Hip Hikes Standing 3\" 2 x 10 Added 7/20 (R) stance                    Therapeutic Exercise  Exercises x 20'     TA contraction 10x10''    Supine Bridges w/ alt marches 2 x 10 Normal bridges 7/8 ^ marches 7/20   Prone IR/ER manual isometrics 3\" x 10 ea Added 6/21   Upright Bike 5' warm up    MH Hip Abduction 30lbs x 15 ea Added 7/8   Wall sit 4x to fatigue  7/13   Lateral step down 4\" 2 x10 7/13   Supine lumbar rotation 1x 8, 5 sec hold For low back pain soreness   Yoga ball lumbar strech 3x 30 sec Improved low back soreness   Leg Press DL 80lbs 3 x 10 Range: 90-10 Added 7/20                     Manual x15'          Hip ROM/Circumduction Within current restrictions 8'    STM anterior hip/thigh 5'    Ankle Mobs/PROM Grade-         Access Code: [email or web address]  URL: [email or web address]  Date: 07/08/2021  Prepared by: Henny Apa    Exercises  Supine Quadricep Sets - 2 x daily - 7 x weekly - 1 sets - 10 reps - 10 hold  Supine Transversus Abdominis Bracing with Heel Slide - 1 x daily - 7 x weekly - 1 sets - 10 reps - 10 hold  Supine Straight Leg Hip Adduction and Quad Set with Ball - 2 x daily - 7 x weekly - 1 sets - 10 reps - 10 hold  Supine Bridge - 1 x daily - 4-5 x weekly - 3 sets - 10 reps - 3 hold  Quadruped Alternating Leg Extensions - 1 x daily - 4-5 x weekly - 2 sets - 10 reps        Therapeutic Exercise and NMR EXR  [x] (76136) Provided verbal/tactile cueing for activities related to strengthening, flexibility, endurance, ROM for improvements in LE, proximal hip, and core control with self care, mobility, lifting, ambulation.  [] (23021) Provided verbal/tactile cueing for activities related to improving balance, coordination, kinesthetic sense, posture, motor skill, proprioception  to assist with LE, proximal hip, and core control in self care, mobility, lifting, ambulation and eccentric single leg control.      NMR and Therapeutic Activities:    [] (89113 or 48687) Provided verbal/tactile cueing for activities related to improving balance, coordination, kinesthetic sense, posture, motor skill, proprioception and motor activation to allow for proper function of core, proximal hip and LE with self care and ADLs  [x] (42542) Gait Re-education- Provided training and instruction to the patient for proper LE, core and proximal hip recruitment and positioning and eccentric body weight control with ambulation re-education including up and down stairs     Home Exercise Program:    [x] (76439) Reviewed/Progressed HEP activities related to strengthening, flexibility, endurance, ROM of core, proximal hip and LE for functional self-care, mobility, lifting and ambulation/stair navigation   [] (58999)Reviewed/Progressed HEP activities related to improving balance, coordination, kinesthetic sense, posture, motor skill, proprioception of core, proximal hip and LE for self care, mobility, lifting, and ambulation/stair navigation      Manual Treatments:  PROM / STM / Oscillations-Mobs:  G-I, II, III, IV (PA's, Inf., Post.)  [x] (10670) Provided manual therapy to mobilize LE, proximal hip and/or LS spine soft tissue/joints for the purpose of modulating pain, promoting relaxation,  increasing ROM, reducing/eliminating soft tissue swelling/inflammation/restriction, improving soft tissue extensibility and allowing for proper ROM for normal function with self care, mobility, lifting and ambulation. Modalities:  declined    Charges:  Timed Code Treatment Minutes: 45'   Total Treatment Minutes: 39'     [] EVAL (LOW) 30115 (typically 20 minutes face-to-face)  [] EVAL (MOD) 39332 (typically 30 minutes face-to-face)  [] EVAL (HIGH) 32557 (typically 45 minutes face-to-face)  [] RE-EVAL     [x] QJ(11402) x 1  [] IONTO  [x] NMR (18552) x1    [] VASO  [x] Manual (16475) x  1   [] Other:  [] TA x     [] Mech Traction (46934)  [] ES(attended) (57234)      [] ES (un) (49724):     GOALS:  Updated 6/2  Short Term Goals: To be achieved in: 2 week  1. Independent in HEP and progression per patient tolerance, in order to prevent re-injury. [] Progressing: [] Met: [] Not Met: [] Adjusted   2. Patient will have a 50% or > decrease in pain at worse (R) hip in order for patient to progress per protocol in therapy   [] Progressing: [] Met: [] Not Met: [] Adjusted     Long Term Goals: To be achieved in: 12 weeks  1. Patient will demonstrate full functional AROM of the (R) hip in all planes to help normalize gait pattern and for improved performance of daily activities. [] Progressing: [] Met: [] Not Met: [] Adjusted   2. Patient will demonstrate a 50% or > improvement on her LEFS score for improved perceived functioning with daily activities. [] Progressing: [] Met: [] Not Met: [] Adjusted   3. Patient will demonstrate proximal hip strength of 4/5 or > for adequate hip stability and improved performance of normal daily/work activities. [] Progressing: [] Met: [] Not Met: [] Adjusted         Progression Towards Functional goals:  [x] Patient is progressing as expected towards functional goals listed. [] Progression is slowed due to complexities listed. [] Progression has been slowed due to co-morbidities.   [] Plan just implemented, too soon to assess goals progression  [] Other: ASSESSMENT:  Progressed stabilization program today to include rotational control exercise of the core/hips as well as bridging with marches. Pain well controlled throughout. Improved ROM of the (R) hip. Pt still requires regular cueing to slow down during exercise and to focus on proper form. Treatment/Activity Tolerance:  [] Patient tolerated treatment well [] Patient limited by fatique  [x] Patient limited by pain  [] Patient limited by other medical complications  [] Other:     Prognosis: [x] Good [] Fair  [] Poor    Patient Requires Follow-up: [x] Yes  [] No    PLAN: continued progression into phase III of program  [x] Continue per plan of care [x] Alter current plan (see comments)  [] Plan of care initiated [] Hold pending MD visit [] Discharge      Electronically signed by: Antonella Abreu PT, DPT       Note: If patient does not return for scheduled/ recommended follow up visits, this note will serve as a discharge from care along with most recent update on progress.

## 2021-07-21 ENCOUNTER — OFFICE VISIT (OUTPATIENT)
Dept: FAMILY MEDICINE CLINIC | Age: 56
End: 2021-07-21
Payer: COMMERCIAL

## 2021-07-21 VITALS
SYSTOLIC BLOOD PRESSURE: 118 MMHG | HEART RATE: 98 BPM | WEIGHT: 185.6 LBS | OXYGEN SATURATION: 98 % | BODY MASS INDEX: 35.07 KG/M2 | RESPIRATION RATE: 16 BRPM | DIASTOLIC BLOOD PRESSURE: 88 MMHG | TEMPERATURE: 97.6 F

## 2021-07-21 DIAGNOSIS — F41.8 DEPRESSION WITH ANXIETY: Primary | ICD-10-CM

## 2021-07-21 DIAGNOSIS — K58.1 IRRITABLE BOWEL SYNDROME WITH CONSTIPATION: ICD-10-CM

## 2021-07-21 DIAGNOSIS — R73.9 HYPERGLYCEMIA: ICD-10-CM

## 2021-07-21 DIAGNOSIS — R10.84 GENERALIZED ABDOMINAL PAIN: ICD-10-CM

## 2021-07-21 DIAGNOSIS — F90.0 ATTENTION DEFICIT HYPERACTIVITY DISORDER (ADHD), PREDOMINANTLY INATTENTIVE TYPE: ICD-10-CM

## 2021-07-21 DIAGNOSIS — K44.9 HIATAL HERNIA: ICD-10-CM

## 2021-07-21 PROCEDURE — 99214 OFFICE O/P EST MOD 30 MIN: CPT | Performed by: FAMILY MEDICINE

## 2021-07-21 PROCEDURE — 3017F COLORECTAL CA SCREEN DOC REV: CPT | Performed by: FAMILY MEDICINE

## 2021-07-21 PROCEDURE — G8427 DOCREV CUR MEDS BY ELIG CLIN: HCPCS | Performed by: FAMILY MEDICINE

## 2021-07-21 PROCEDURE — 1036F TOBACCO NON-USER: CPT | Performed by: FAMILY MEDICINE

## 2021-07-21 PROCEDURE — G8417 CALC BMI ABV UP PARAM F/U: HCPCS | Performed by: FAMILY MEDICINE

## 2021-07-21 RX ORDER — METHYLPHENIDATE HYDROCHLORIDE 36 MG/1
36 TABLET ORAL DAILY
Qty: 30 TABLET | Refills: 0 | Status: CANCELLED | OUTPATIENT
Start: 2021-07-21 | End: 2021-08-20

## 2021-07-21 RX ORDER — DESVENLAFAXINE 50 MG/1
50 TABLET, EXTENDED RELEASE ORAL DAILY
Qty: 30 TABLET | Refills: 5 | Status: SHIPPED | OUTPATIENT
Start: 2021-07-21

## 2021-07-21 RX ORDER — METHYLPHENIDATE HYDROCHLORIDE 36 MG/1
36 TABLET ORAL EVERY MORNING
Qty: 30 TABLET | Refills: 0 | Status: SHIPPED | OUTPATIENT
Start: 2021-07-21 | End: 2021-08-31 | Stop reason: SDUPTHER

## 2021-07-21 RX ORDER — HYDROXYZINE HYDROCHLORIDE 25 MG/1
25 TABLET, FILM COATED ORAL EVERY 8 HOURS PRN
Qty: 30 TABLET | Refills: 5 | Status: SHIPPED | OUTPATIENT
Start: 2021-07-21 | End: 2022-10-18 | Stop reason: SDUPTHER

## 2021-07-21 RX ORDER — PANTOPRAZOLE SODIUM 40 MG/1
40 TABLET, DELAYED RELEASE ORAL
Qty: 30 TABLET | Refills: 5 | Status: SHIPPED | OUTPATIENT
Start: 2021-07-21 | End: 2022-03-21

## 2021-07-21 RX ORDER — AZITHROMYCIN 250 MG/1
TABLET, FILM COATED ORAL
Qty: 1 PACKET | Refills: 0 | Status: CANCELLED | OUTPATIENT
Start: 2021-07-21 | End: 2021-07-31

## 2021-07-21 RX ORDER — METHYLPHENIDATE HYDROCHLORIDE 36 MG/1
36 TABLET ORAL EVERY MORNING
Qty: 30 TABLET | Refills: 0 | Status: CANCELLED | OUTPATIENT
Start: 2021-09-19 | End: 2021-10-19

## 2021-07-21 SDOH — ECONOMIC STABILITY: TRANSPORTATION INSECURITY
IN THE PAST 12 MONTHS, HAS THE LACK OF TRANSPORTATION KEPT YOU FROM MEDICAL APPOINTMENTS OR FROM GETTING MEDICATIONS?: NO

## 2021-07-21 SDOH — ECONOMIC STABILITY: FOOD INSECURITY: WITHIN THE PAST 12 MONTHS, YOU WORRIED THAT YOUR FOOD WOULD RUN OUT BEFORE YOU GOT MONEY TO BUY MORE.: NEVER TRUE

## 2021-07-21 SDOH — ECONOMIC STABILITY: FOOD INSECURITY: WITHIN THE PAST 12 MONTHS, THE FOOD YOU BOUGHT JUST DIDN'T LAST AND YOU DIDN'T HAVE MONEY TO GET MORE.: NEVER TRUE

## 2021-07-21 SDOH — ECONOMIC STABILITY: TRANSPORTATION INSECURITY
IN THE PAST 12 MONTHS, HAS LACK OF TRANSPORTATION KEPT YOU FROM MEETINGS, WORK, OR FROM GETTING THINGS NEEDED FOR DAILY LIVING?: NO

## 2021-07-21 ASSESSMENT — SOCIAL DETERMINANTS OF HEALTH (SDOH): HOW HARD IS IT FOR YOU TO PAY FOR THE VERY BASICS LIKE FOOD, HOUSING, MEDICAL CARE, AND HEATING?: NOT HARD AT ALL

## 2021-07-21 NOTE — PATIENT INSTRUCTIONS
1) continue pristiq at 50mg, take every day  2) resume concerta  3) get bloodwork done  4) stop omeprazole and change to protonix 40mg a day

## 2021-07-21 NOTE — PROGRESS NOTES
Here for f/u and recheck of mood, ADD. Pt overall has been doing well, states that things are doing well. Pt is feeling well. Pt has noted some issues of urinary frequency and is concerned of diabetes mellitus. Pt feels that she is always thirsty, and drinking a lot of water. Appetite is ok. Has been getting some nausea. pts blood sugars with prior elevation of a1c at 6.0. Pt has not been as active with lifestyle s/p hip procedures    Here for follow up of ADD. Pt states that they are doing very well with current therapy and feels that control of symptoms are adequate at this time. No breakthru symptoms and no need/indication for adjustment in therapy. Taking meds as prescribed and denies any illicit medication usage of misuse of their stimulant thearpy. Mood is stable and denies any issues of depression or anxiety associated with treatment of ADD. Pt has had some mild nausea, sx are daily but irregularly. No emesis. Does feel some generalized discomfort. Eating ok. Bowels are normal but at times can get issues with constipation. Pt denies any blood in bowels but can be oily. Last colonoscopy 3 yrs ago    Except as noted above in the history of present illness, the review of systems is  negative for headache, vision changes, chest pain, shortness of breath, abdominal pain, urinary sx, bowel changes. Past medical, surgical, and social history reviewed and updated  Medications and allergies reviewed and updated      O: /88   Pulse 98   Temp 97.6 °F (36.4 °C) (Temporal)   Resp 16   Wt 185 lb 9.6 oz (84.2 kg)   SpO2 98%   BMI 35.07 kg/m²   GEN: No acute distress, cooperative, well nourished, alert. HEENT: PEERLA, EOMI , normocephalic/atraumatic, nares and oropharynx clear. Mucous membranes normal, Tympanic membranes clear bilaterally. Neck: soft, supple, no thyromegaly, mass, no Lymphadenopathy  CV: Regular rate and rhythm, no murmur, rubs, gallops. No edema.   Resp: Clear to auscultation bilaterally good air entry bilaterally  No crackles, wheeze. Breathing comfortably. Abd: soft, mild epigastric tender to palpation. normoactive bowels sounds. No hepatosplenomegaly   No costovertebral angle tenderness, mass  Psych: mod dysthymia, denies any ST/SI        Current Outpatient Medications   Medication Sig Dispense Refill    methylphenidate (CONCERTA) 36 MG extended release tablet Take 1 tablet by mouth every morning for 30 days. 30 tablet 0    desvenlafaxine succinate (PRISTIQ) 50 MG TB24 extended release tablet Take 1 tablet by mouth daily 30 tablet 5    hydrOXYzine (ATARAX) 25 MG tablet Take 1 tablet by mouth every 8 hours as needed for Anxiety 30 tablet 5    pantoprazole (PROTONIX) 40 MG tablet Take 1 tablet by mouth every morning (before breakfast) 30 tablet 5    albuterol sulfate HFA (PROVENTIL HFA) 108 (90 Base) MCG/ACT inhaler Inhale 2 puffs into the lungs every 6 hours as needed for Wheezing 1 Inhaler 3     No current facility-administered medications for this visit. ASSESSMENT / PLAN:    1. Attention deficit hyperactivity disorder (ADHD), predominantly inattentive type  Stable with concerta  Continue present management. - methylphenidate (CONCERTA) 36 MG extended release tablet; Take 1 tablet by mouth every morning for 30 days. Dispense: 30 tablet; Refill: 0    2. Depression with anxiety  breakthru sx but has not been taking her pristiq every day d/t nausea. Discussed tx options. Will eval abd pain/nausea as below and encourage QD dosing    3. Hyperglycemia  Random blood sugars 104  Check bloodwork as below  - Lipid Panel; Future  - Hemoglobin A1C; Future  - TSH without Reflex; Future    4. Irritable bowel syndrome with constipation  Cont supportive therapy    5. Generalized abdominal pain  Check bloodwork as below  Change to protonix 40mg qd  Likely will need EGD  - CBC; Future  - Comprehensive Metabolic Panel; Future  - Amylase;  Future  - Lipase; Future    6. Hiatal hernia  Await bloodwork as above  Change to protonix 40mg qd  May benefit from EGD           Follow-up appointment:   Pending bloodwork results    Discussed use, benefit, and side effects of all prescribed medications. Barriers to medication compliance addressed. All patient questions answered. Pt voiced understanding. When applicable, patient's outside records were reviewed through [facility]. The patient has signed appropriate paperworks/consents.

## 2021-07-22 ENCOUNTER — HOSPITAL ENCOUNTER (OUTPATIENT)
Dept: PHYSICAL THERAPY | Age: 56
Setting detail: THERAPIES SERIES
Discharge: HOME | End: 2021-07-22
Payer: COMMERCIAL

## 2021-07-22 PROCEDURE — 97112 NEUROMUSCULAR REEDUCATION: CPT

## 2021-07-22 PROCEDURE — 97110 THERAPEUTIC EXERCISES: CPT

## 2021-07-22 PROCEDURE — 97140 MANUAL THERAPY 1/> REGIONS: CPT

## 2021-07-22 NOTE — FLOWSHEET NOTE
Exercises/Interventions:             Script-  Therapeutic Activity                              Neuromuscular Re-education x 15     Ta activation with gentle UE perturbations @ 90 deg 1x20\"    Gentle Weight Shifting within restrictions To (R) LE 3'    NBOS on airex UE support as needed maintaining WB restrictions 20\" x 3 Added 6/21   TRX Squats to 60 deg 2x10 Reviewed 7/22 cueing for hip hinge, glut activation   Mini Bosu Ball Lunge Iso With manual perturbations for Hip ER activation 2lb   20\" x 5 reps Added 7/8 focus on ER activation   SLS on airex 3 reps to fatigue  7/13/21   Quadruped Alt LE 2x 15 ea Added 7/8 cueing for trunk control, minimizing compensation   Gait Training One crutch and no crutches X 5'   Standing Anti-Rotation Red 5\" x 10 ea side Added 7/20   Hip Hikes Standing 3\" 2 x 10 Added 7/20 (R) stance                    Therapeutic Exercise  Exercises x 25'     TA contraction 10x10''    Supine Bridges w/ ecc lowering 2 x 10 Normal bridges 7/8 ^ 7/22   Hip Flexor Stretch 30\" x 3 Added 7/22   Prone IR/ER manual isometrics 3\" x 10 ea Added 6/21   Upright Bike 5' warm up    MH Hip Abduction 30lbs x 15 ea Added 7/8   Wall sit 4x to fatigue  7/13   Lateral step down 4\" 2 x10 7/13   Supine lumbar rotation 1x 8, 5 sec hold For low back pain soreness   Yoga ball lumbar strech 3x 30 sec Improved low back soreness   Leg Press DL 80lbs 3 x 10 Range: 80-10 Added 7/20                     Manual x15'          Hip ROM/Circumduction Within current restrictions 8'    STM anterior hip/thigh 5'    Ankle Mobs/PROM Grade-         Access Code: [email or web address]  URL: [email or web address]  Date: 07/08/2021  Prepared by: Nirmala Godwin    Exercises  Supine Quadricep Sets - 2 x daily - 7 x weekly - 1 sets - 10 reps - 10 hold  Supine Transversus Abdominis Bracing with Heel Slide - 1 x daily - 7 x weekly - 1 sets - 10 reps - 10 hold  Supine Straight Leg Hip Adduction and Quad Set with Ball - 2 x daily - 7 x weekly - 1 sets - 10 reps - 10 hold  Supine Bridge - 1 x daily - 4-5 x weekly - 3 sets - 10 reps - 3 hold  Quadruped Alternating Leg Extensions - 1 x daily - 4-5 x weekly - 2 sets - 10 reps        Therapeutic Exercise and NMR EXR  [x] (57108) Provided verbal/tactile cueing for activities related to strengthening, flexibility, endurance, ROM for improvements in LE, proximal hip, and core control with self care, mobility, lifting, ambulation.  [] (23221) Provided verbal/tactile cueing for activities related to improving balance, coordination, kinesthetic sense, posture, motor skill, proprioception  to assist with LE, proximal hip, and core control in self care, mobility, lifting, ambulation and eccentric single leg control.      NMR and Therapeutic Activities:    [] (29865 or 86989) Provided verbal/tactile cueing for activities related to improving balance, coordination, kinesthetic sense, posture, motor skill, proprioception and motor activation to allow for proper function of core, proximal hip and LE with self care and ADLs  [x] (57691) Gait Re-education- Provided training and instruction to the patient for proper LE, core and proximal hip recruitment and positioning and eccentric body weight control with ambulation re-education including up and down stairs     Home Exercise Program:    [x] (31867) Reviewed/Progressed HEP activities related to strengthening, flexibility, endurance, ROM of core, proximal hip and LE for functional self-care, mobility, lifting and ambulation/stair navigation   [] (58260)Reviewed/Progressed HEP activities related to improving balance, coordination, kinesthetic sense, posture, motor skill, proprioception of core, proximal hip and LE for self care, mobility, lifting, and ambulation/stair navigation      Manual Treatments:  PROM / STM / Oscillations-Mobs:  G-I, II, III, IV (PA's, Inf., Post.)  [x] (75673) Provided manual therapy to mobilize LE, proximal hip and/or LS spine soft tissue/joints for the Progression has been slowed due to co-morbidities. [] Plan just implemented, too soon to assess goals progression  [] Other:     ASSESSMENT:  Pt demonstrated improved hip hinge with squats and had better control with ECC bridges this date. There are significant restrictions (R) hip flexors, along TFL as well. Pt localizes much of her discomfort to the hip flexors. Educated pt on the fact that this is a common area of irritation as she has progressed her walking, but these muscles were protected for a prolonged period of time. Overall, pt continues to progress well. Treatment/Activity Tolerance:  [] Patient tolerated treatment well [] Patient limited by fatique  [x] Patient limited by pain  [] Patient limited by other medical complications  [] Other:     Prognosis: [x] Good [] Fair  [] Poor    Patient Requires Follow-up: [x] Yes  [] No    PLAN: continued progression into phase III of program  [x] Continue per plan of care [x] Alter current plan (see comments)  [] Plan of care initiated [] Hold pending MD visit [] Discharge      Electronically signed by: Gaye Swan PT, DPT       Note: If patient does not return for scheduled/ recommended follow up visits, this note will serve as a discharge from care along with most recent update on progress.

## 2021-07-27 ENCOUNTER — APPOINTMENT (OUTPATIENT)
Dept: PHYSICAL THERAPY | Age: 56
End: 2021-07-27
Payer: COMMERCIAL

## 2021-07-27 DIAGNOSIS — R10.84 GENERALIZED ABDOMINAL PAIN: ICD-10-CM

## 2021-07-27 DIAGNOSIS — R73.9 HYPERGLYCEMIA: ICD-10-CM

## 2021-07-27 LAB
A/G RATIO: 1.6 (ref 1.1–2.2)
ALBUMIN SERPL-MCNC: 4.6 G/DL (ref 3.4–5)
ALP BLD-CCNC: 101 U/L (ref 40–129)
ALT SERPL-CCNC: 17 U/L (ref 10–40)
AMYLASE: 45 U/L (ref 25–115)
ANION GAP SERPL CALCULATED.3IONS-SCNC: 15 MMOL/L (ref 3–16)
AST SERPL-CCNC: 15 U/L (ref 15–37)
BILIRUB SERPL-MCNC: 0.5 MG/DL (ref 0–1)
BUN BLDV-MCNC: 14 MG/DL (ref 7–20)
CALCIUM SERPL-MCNC: 8.8 MG/DL (ref 8.3–10.6)
CHLORIDE BLD-SCNC: 102 MMOL/L (ref 99–110)
CHOLESTEROL, TOTAL: 193 MG/DL (ref 0–199)
CO2: 24 MMOL/L (ref 21–32)
CREAT SERPL-MCNC: 0.8 MG/DL (ref 0.6–1.1)
GFR AFRICAN AMERICAN: >60
GFR NON-AFRICAN AMERICAN: >60
GLOBULIN: 2.8 G/DL
GLUCOSE BLD-MCNC: 104 MG/DL (ref 70–99)
HCT VFR BLD CALC: 40.4 % (ref 36–48)
HDLC SERPL-MCNC: 74 MG/DL (ref 40–60)
HEMOGLOBIN: 13.5 G/DL (ref 12–16)
LDL CHOLESTEROL CALCULATED: 79 MG/DL
LIPASE: 28 U/L (ref 13–60)
MCH RBC QN AUTO: 30.3 PG (ref 26–34)
MCHC RBC AUTO-ENTMCNC: 33.6 G/DL (ref 31–36)
MCV RBC AUTO: 90.4 FL (ref 80–100)
PDW BLD-RTO: 14.9 % (ref 12.4–15.4)
PLATELET # BLD: 236 K/UL (ref 135–450)
PMV BLD AUTO: 8.5 FL (ref 5–10.5)
POTASSIUM SERPL-SCNC: 3.6 MMOL/L (ref 3.5–5.1)
RBC # BLD: 4.47 M/UL (ref 4–5.2)
SODIUM BLD-SCNC: 141 MMOL/L (ref 136–145)
TOTAL PROTEIN: 7.4 G/DL (ref 6.4–8.2)
TRIGL SERPL-MCNC: 200 MG/DL (ref 0–150)
TSH SERPL DL<=0.05 MIU/L-ACNC: 6.58 UIU/ML (ref 0.27–4.2)
VLDLC SERPL CALC-MCNC: 40 MG/DL
WBC # BLD: 7.7 K/UL (ref 4–11)

## 2021-07-28 DIAGNOSIS — R79.89 ELEVATED TSH: ICD-10-CM

## 2021-07-28 DIAGNOSIS — R79.89 ELEVATED TSH: Primary | ICD-10-CM

## 2021-07-28 LAB
ESTIMATED AVERAGE GLUCOSE: 111.2 MG/DL
HBA1C MFR BLD: 5.5 %
T4 FREE: 1 NG/DL (ref 0.9–1.8)

## 2021-07-29 ENCOUNTER — HOSPITAL ENCOUNTER (OUTPATIENT)
Dept: PHYSICAL THERAPY | Age: 56
Setting detail: THERAPIES SERIES
Discharge: HOME | End: 2021-07-29
Payer: COMMERCIAL

## 2021-07-29 PROCEDURE — 97112 NEUROMUSCULAR REEDUCATION: CPT

## 2021-07-29 PROCEDURE — 97110 THERAPEUTIC EXERCISES: CPT

## 2021-07-29 PROCEDURE — 97140 MANUAL THERAPY 1/> REGIONS: CPT

## 2021-07-29 NOTE — PROGRESS NOTES
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  · Treatment Diagnosis: PT treatment diagnosis:  right hip pain  L37.514  Insurance/Certification information:   [facility]  20 visits/yr,  $40 copay  Physician Information:  Referring Practitioner: Dr Josiane Francis of care signed (Y/N): Y    Date of Patient follow up with Physician:     Is this a Progress Report:     [x]  Yes  []  No        If Yes:  Date Range for reporting period:  Beginnin2021  Endin2021    Progress report will be due (10 Rx or 30 days whichever is less):  3/10/1413      Recertification will be due (POC Duration  / 90 days whichever is less):  90d        Visit # Insurance Allowable Auth Required   13 (16 total) 20 []  Yes [x]  No        Functional Scale:   LEFS:10% LOF   Date assessed:   2021    Latex Allergy:  [x]NO      []YES  Preferred Language for Healthcare:   [x]English       []other    Pain level:0/10 currently  4-5/10 at worst     SUBJECTIVE:   Pt is now 7 weeks post op. Pt has returned to work as of this past Monday. Pt notes she had a tough night Tuesday in regards to her hip. Pt states her hip is feeling better but that her back is flared up today. Pt notes she is standing much more with her return to work.     OBJECTIVE: 2021    Observation:     Bridge: Pt able to demonstrate good control with a SL eccentric lower *    ROM LEFT RIGHT   HIP Flex 116 112 deg (mild pain)   HIP Abd NT  38 deg    HIP Ext NT Able to get to approx 10 deg passively   HIP IR NT NT   HIP ER NT 50 deg   Knee ext 0 0   Knee Flex 135 132   Strength deferred at this time LEFT RIGHT   HIP Flexors     HIP Abductors     HIP Ext     Hip ER     Knee EXT (quad)     Knee Flex (HS)           Gait: WBAT with one crutch. Struggles to get good heel strike (habit more then actual deficit),      RESTRICTIONS/PRECAUTIONS:     Exercises/Interventions:               Therapeutic Activity                              Neuromuscular Re-education x 15     Ta activation with gentle UE perturbations @ 90 deg 1x20\"    Gentle Weight Shifting within restrictions To (R) LE 3'    NBOS on airex UE support as needed maintaining WB restrictions 20\" x 3 Added 6/21   TRX Squats to 60 deg 15' Reviewed 7/22 cueing for hip hinge, glut activation   Mini Bosu Ball Lunge Iso With manual perturbations for Hip ER activation 2lb   20\" x 5 reps Added 7/8 focus on ER activation   SLS on airex 3 reps to fatigue  7/13/21   Quadruped Alt LE 2x 15 ea Added 7/8 cueing for trunk control, minimizing compensation   Gait Training One crutch and no crutches X 5'   Standing Anti-Rotation Red 5\" x 10 ea side Added 7/20   Hip Hikes Standing 3\" 2 x 10 Added 7/20 (R) stance   Lateral Stepping Green Band 3 laps 10 steps out/back Added 7/29                              Therapeutic Exercise  Exercises x 15'     TA contraction 10x10''    Supine Bridges w/ ecc lowering 2 x 10 Normal bridges 7/8 ^ 7/22   Hip Flexor Stretch 30\" x 3 Added 7/22   Prone IR/ER manual isometrics 3\" x 10 ea Added 6/21   Upright Bike 5' warm up    MH Hip Abduction 30lbs x 15 ea Added 7/8   Wall sit 4x to fatigue  7/13   Lateral step down 4\" 2 x10 7/13   Supine lumbar rotation 1x 8, 5 sec hold For low back pain soreness   Yoga ball lumbar strech 3x 30 sec Improved low back soreness   Leg Press DL 90lbs 3 x 10 Range: 80-10 Added 7/20 ^ 7/29                     Manual x15'          Hip ROM/lat belt mobs Gr II/III mobs,      STM anterior hip/thigh 5'    Ankle Mobs/PROM Grade-         Access Code: [email or web address]  URL: [email or web address]  Date: 07/08/2021  Prepared by: Candis Weinstein    Exercises  Supine Quadricep Sets - 2 x daily - 7 x weekly - 1 sets - 10 reps - 10 hold  Supine Transversus Abdominis Bracing with Heel Slide - 1 x daily - 7 x weekly - 1 sets - 10 reps - 10 hold  Supine Straight Leg Hip Adduction and Quad Set with Ball - 2 x daily - 7 x weekly - 1 sets - 10 reps - 10 hold  Supine Bridge - 1 x daily - 4-5 x weekly - 3 sets - 10 reps - 3 hold  Quadruped Alternating Leg Extensions - 1 x daily - 4-5 x weekly - 2 sets - 10 reps        Therapeutic Exercise and NMR EXR  [x] (87989) Provided verbal/tactile cueing for activities related to strengthening, flexibility, endurance, ROM for improvements in LE, proximal hip, and core control with self care, mobility, lifting, ambulation.  [] (71969) Provided verbal/tactile cueing for activities related to improving balance, coordination, kinesthetic sense, posture, motor skill, proprioception  to assist with LE, proximal hip, and core control in self care, mobility, lifting, ambulation and eccentric single leg control.      NMR and Therapeutic Activities:    [] (46618 or 96782) Provided verbal/tactile cueing for activities related to improving balance, coordination, kinesthetic sense, posture, motor skill, proprioception and motor activation to allow for proper function of core, proximal hip and LE with self care and ADLs  [x] (84466) Gait Re-education- Provided training and instruction to the patient for proper LE, core and proximal hip recruitment and positioning and eccentric body weight control with ambulation re-education including up and down stairs     Home Exercise Program:    [x] (14530) Reviewed/Progressed HEP activities related to strengthening, flexibility, endurance, ROM of core, proximal hip and LE for functional self-care, mobility, lifting and ambulation/stair navigation   [] (84915)Reviewed/Progressed HEP activities related to improving balance, coordination, kinesthetic sense, posture, motor skill, proprioception of core, proximal hip and LE for self care, mobility, lifting, and ambulation/stair navigation      Manual Treatments:  PROM / STM / Oscillations-Mobs:  G-I, II, III, IV (PA's, Inf., Post.)  [x] (43768) Provided manual therapy to mobilize LE, proximal hip and/or LS spine soft tissue/joints for the purpose of modulating pain, promoting relaxation,  increasing ROM, reducing/eliminating soft tissue swelling/inflammation/restriction, improving soft tissue extensibility and allowing for proper ROM for normal function with self care, mobility, lifting and ambulation. Modalities:  declined    Charges:  Timed Code Treatment Minutes: 45'   Total Treatment Minutes: 39'     [] EVAL (LOW) 27950 (typically 20 minutes face-to-face)  [] EVAL (MOD) 38372 (typically 30 minutes face-to-face)  [] EVAL (HIGH) 57917 (typically 45 minutes face-to-face)  [] RE-EVAL     [x] XQ(28417) x 1  [] IONTO  [x] NMR (82992) x1    [] VASO  [x] Manual (40634) x  1   [] Other:  [] TA x     [] Mech Traction (98650)  [] ES(attended) (90320)      [] ES (un) (09874):     GOALS:  Updated 6/2  Short Term Goals: To be achieved in: 2 week  1. Independent in HEP and progression per patient tolerance, in order to prevent re-injury. [] Progressing: [x] Met: [] Not Met: [] Adjusted   2. Patient will have a 50% or > decrease in pain at worse (R) hip in order for patient to progress per protocol in therapy   [] Progressing: [x] Met: [] Not Met: [] Adjusted     Long Term Goals: To be achieved in: 12 weeks  1. Patient will demonstrate full functional AROM of the (R) hip in all planes to help normalize gait pattern and for improved performance of daily activities. [] Progressing: [x] Met: [] Not Met: [] Adjusted   2. Patient will demonstrate a 50% or > improvement on her LEFS score for improved perceived functioning with daily activities. [] Progressing: [] Met: [] Not Met: [] Adjusted   3.  Patient will demonstrate proximal hip strength of 4/5 or > for adequate hip stability and improved performance of normal daily/work activities. [] Progressing: [] Met: [x] Not Met: [] Adjusted         Progression Towards Functional goals:  [x] Patient is progressing as expected towards functional goals listed. [] Progression is slowed due to complexities listed. [] Progression has been slowed due to co-morbidities. [] Plan just implemented, too soon to assess goals progression  [] Other:     ASSESSMENT:  Pt demonstrated improved hip hinge with squats and had better control with ECC bridges this date. Pt continues to progress overall and is close to her normal functional (R) hip ROM. Pt does not yet have adequate proximal hip/core control to perform a SL bridge. No pain with squats, however pt has difficulty activating posterior chain. Pt does have a limited number of visits left (per insurance) and will only be able to attend therapy 1x/wk to every other week to help maximize these visits. Pt has 4 visits remaining in total.  Discussed with pt that this makes compliance with her HEP even more important as she will have less time in formal therapy. Therapy will continue to emphasize progression of HEP while addressing any mobility/pain issues that may arise with recent return to work when in formal therapy. Treatment/Activity Tolerance:  [] Patient tolerated treatment well [] Patient limited by fatique  [x] Patient limited by pain  [] Patient limited by other medical complications  [] Other:     Prognosis: [x] Good [] Fair  [] Poor    Patient Requires Follow-up: [x] Yes  [] No    PLAN: continued progression into phase III of program  [x] Continue per plan of care [x] Alter current plan (see comments)  [] Plan of care initiated [] Hold pending MD visit [] Discharge      Electronically signed by: Mary Beth De Santiago PT, DPT       Note: If patient does not return for scheduled/ recommended follow up visits, this note will serve as a discharge from care along with most recent update on progress.

## 2021-08-06 ENCOUNTER — HOSPITAL ENCOUNTER (OUTPATIENT)
Dept: PHYSICAL THERAPY | Age: 56
Setting detail: THERAPIES SERIES
Discharge: HOME | End: 2021-08-06
Payer: COMMERCIAL

## 2021-08-06 PROCEDURE — 97110 THERAPEUTIC EXERCISES: CPT

## 2021-08-06 PROCEDURE — 97112 NEUROMUSCULAR REEDUCATION: CPT

## 2021-08-06 NOTE — FLOWSHEET NOTE
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  · Treatment Diagnosis: PT treatment diagnosis:  right hip pain  H68.378  Insurance/Certification information:   [facility]  20 visits/yr,  $40 copay  Physician Information:  Referring Practitioner: Dr Heber Staton of [facility] signed (Y/N): Y    Date of Patient follow up with Physician:     Is this a Progress Report:     []  Yes  [x]  No        If Yes:  Date Range for reporting period:  Beginnin2021  Endin2021    Progress report will be due (10 Rx or 30 days whichever is less):        Recertification will be due (POC Duration  / 90 days whichever is less):  90d        Visit # Insurance Allowable Auth Required   14 (17 total) 20 []  Yes [x]  No        Functional Scale:   LEFS:10% LOF   Date assessed:   2021    Latex Allergy:  [x]NO      []YES  Preferred Language for Healthcare:   [x]English       []other    Pain level:0/10 currently  1-2/10 at worst     SUBJECTIVE:   Pt notes her pain is getting better and better. Pt is getting used to work and now only really has intermittent pain toward the end of the day. Pt notes the pain is mild when it is there. Pt is 10 weeks post op today. 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       OBJECTIVE: 2021    Observation:     Bridge: SL Bridge Test: weakness present, but able to lift and maintain level pelvis    AROM of (R) Hip: symmetrical to (L) in all planes,   Mild pain at end range with hip flexion    Strength:  Hip Flex: (R): 4-/5 (mild pain)(L): 4/5  Hip Abd: (R): 4/5  (L): 4/5  Hip ER: (R): 4+/5  (L): 4+/5   :   Knee Ext/Quads: (R): 5/5  (L): 5/5  Knee Flex/Hamstrings: (R):5/5 (L):5/5          RESTRICTIONS/PRECAUTIONS:     Exercises/Interventions:               Therapeutic Activity                              Neuromuscular Re-education x 15     Ta activation with gentle UE perturbations @ 90 deg 1x20\"    Gentle Weight Shifting within restrictions To (R) LE 3'    NBOS on airex UE support as needed maintaining WB restrictions 20\" x 3 Added 6/21    Squats to table 2x10 Reviewed 7/22 cueing for hip hinge, glut activation   Mini Bosu Ball Lunge Iso With manual perturbations for Hip ER activation 2lb   20\" x 5 reps Added 7/8 focus on ER activation   SLS on airex 3 reps to fatigue  7/13/21   Quadruped Alt LE 2x 15 ea Added 7/8 cueing for trunk control, minimizing compensation        Gait Training One crutch and no crutches X 5'   Standing Anti-Rotation Red 5\" x 10 ea side Added 7/20   Hip Hikes Standing 3\" 2 x 10 Added 7/20 (R) stance   Lateral Stepping Green Band 3 laps 10 steps out/back Added 7/29                              Therapeutic Exercise  Exercises x 25'     TA contraction 10x10''    Supine Bridges w/ ecc lowering 2 x 10 Normal bridges 7/8 ^ 7/22   Hip Flexor Stretch 30\" x 3 Added 7/22   Prone IR/ER manual isometrics 3\" x 10 ea Added 6/21   Upright Bike 3' warm up    MH Hip Abduction 30lbs x 15 ea Added 7/8   Wall sit 4x to fatigue  7/13   Lateral step down 4\" 2 x10 7/13   Supine lumbar rotation 1x 8, 5 sec hold For low back pain soreness   Yoga ball lumbar strech 3x 30 sec Improved low back soreness   Leg Press DL 100lbs 3 x 10 Range: 80-10 Added 7/20 ^ 7/29 ^8/5   Step up/Over (R) 8\" x 10' Reviewed 8/6   SL Bridging 2x6 Added 8/6                          Manual x          Hip ROM/lat belt mobs     STM anterior hip/thigh     Ankle Mobs/PROM Grade-         Access Code: [email or web address]  URL: [email or web address]  Date: 07/08/2021  Prepared by: Jas Cornelius    Exercises  Supine Quadricep Sets - 2 x daily - 7 x weekly - 1 sets - 10 reps - 10 hold  Supine Transversus Abdominis Bracing with Heel Slide - 1 x daily - 7 x weekly - 1 sets - 10 reps - 10 hold  Supine Straight Leg Hip Adduction and Quad Set with Ball - 2 x daily - 7 x weekly - 1 sets - 10 reps - 10 hold  Supine Bridge - 1 x daily - 4-5 x weekly - 3 sets - 10 reps - 3 hold  Quadruped Alternating Leg Extensions - 1 x daily - 4-5 x weekly - 2 sets - 10 reps        Therapeutic Exercise and NMR EXR  [x] (27026) Provided verbal/tactile cueing for activities related to strengthening, flexibility, endurance, ROM for improvements in LE, proximal hip, and core control with self care, mobility, lifting, ambulation.  [] (50708) Provided verbal/tactile cueing for activities related to improving balance, coordination, kinesthetic sense, posture, motor skill, proprioception  to assist with LE, proximal hip, and core control in self care, mobility, lifting, ambulation and eccentric single leg control.      NMR and Therapeutic Activities:    [] (38465 or 25371) Provided verbal/tactile cueing for activities related to improving balance, coordination, kinesthetic sense, posture, motor skill, proprioception and motor activation to allow for proper function of core, proximal hip and LE with self care and ADLs  [x] (97543) Gait Re-education- Provided training and instruction to the patient for proper LE, core and proximal hip recruitment and positioning and eccentric body weight control with ambulation re-education including up and down stairs     Home Exercise Program:    [x] (33327) Reviewed/Progressed HEP activities related to strengthening, flexibility, endurance, ROM of core, proximal hip and LE for functional self-care, mobility, lifting and ambulation/stair navigation   [] (36524)Reviewed/Progressed HEP activities related to improving balance, coordination, kinesthetic sense, posture, motor skill, proprioception of core, proximal hip and LE for self care, mobility, lifting, and ambulation/stair navigation      Manual Treatments:  PROM / STM / Oscillations-Mobs:  G-I, II, III, IV (PA's, Inf., Post.)  [x] (27736) Provided manual therapy to mobilize LE, proximal hip and/or LS spine soft tissue/joints for the purpose of modulating pain, promoting relaxation,  increasing ROM, reducing/eliminating soft tissue swelling/inflammation/restriction, improving soft tissue extensibility and allowing for proper ROM for normal function with self care, mobility, lifting and ambulation. Modalities:  declined    Charges:  Timed Code Treatment Minutes: 40'   Total Treatment Minutes: 36'     [] EVAL (LOW) 455 1011 (typically 20 minutes face-to-face)  [] EVAL (MOD) 03665 (typically 30 minutes face-to-face)  [] EVAL (HIGH) 57959 (typically 45 minutes face-to-face)  [] RE-EVAL     [x] KW(36041) x 2  [] IONTO  [x] NMR (18902) x1    [] VASO  [] Manual (06427) x    [] Other:  [] TA x     [] Mech Traction (61661)  [] ES(attended) (90132)      [] ES (un) (47654):     GOALS:  Updated 6/2  Short Term Goals: To be achieved in: 2 week  1. Independent in HEP and progression per patient tolerance, in order to prevent re-injury. [] Progressing: [x] Met: [] Not Met: [] Adjusted   2. Patient will have a 50% or > decrease in pain at worse (R) hip in order for patient to progress per protocol in therapy   [] Progressing: [x] Met: [] Not Met: [] Adjusted     Long Term Goals: To be achieved in: 12 weeks  1. Patient will demonstrate full functional AROM of the (R) hip in all planes to help normalize gait pattern and for improved performance of daily activities. [] Progressing: [x] Met: [] Not Met: [] Adjusted   2. Patient will demonstrate a 50% or > improvement on her LEFS score for improved perceived functioning with daily activities. [] Progressing: [] Met: [] Not Met: [] Adjusted   3.  Patient will demonstrate proximal hip strength of 4/5 or > for adequate hip stability and improved performance of normal daily/work activities. [] Progressing: [] Met: [x] Not Met: [] Adjusted         Progression Towards Functional goals:  [x] Patient is progressing as expected towards functional goals listed. [] Progression is slowed due to complexities listed. [] Progression has been slowed due to co-morbidities. [] Plan just implemented, too soon to assess goals progression  [] Other:     ASSESSMENT:  Pt had no difficulty navigating an 8\" step this date and continues to demonstrate improving LE control. Strength assessed this date and there are mild deficits in (R) hip flexors and hip abductors although strength is nearly symmetrical to (L). Pt has 3 visits left at this time. Therapy will continue to focus on managing remaining deficits while spreading visits out over the next few weeks to ensure continued progress. Treatment/Activity Tolerance:  [] Patient tolerated treatment well [] Patient limited by fatique  [x] Patient limited by pain  [] Patient limited by other medical complications  [] Other:     Prognosis: [x] Good [] Fair  [] Poor    Patient Requires Follow-up: [x] Yes  [] No    PLAN: continued progression into phase III of program  [x] Continue per plan of care [x] Alter current plan (see comments)  [] Plan of care initiated [] Hold pending MD visit [] Discharge      Electronically signed by: Thais Wilson PT, DPT       Note: If patient does not return for scheduled/ recommended follow up visits, this note will serve as a discharge from care along with most recent update on progress.

## 2021-08-13 ENCOUNTER — HOSPITAL ENCOUNTER (OUTPATIENT)
Dept: PHYSICAL THERAPY | Age: 56
Setting detail: THERAPIES SERIES
Discharge: HOME | End: 2021-08-13
Payer: COMMERCIAL

## 2021-08-13 PROCEDURE — 97140 MANUAL THERAPY 1/> REGIONS: CPT

## 2021-08-13 PROCEDURE — 97110 THERAPEUTIC EXERCISES: CPT

## 2021-08-13 PROCEDURE — 97112 NEUROMUSCULAR REEDUCATION: CPT

## 2021-08-13 NOTE — FLOWSHEET NOTE
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  · Treatment Diagnosis: PT treatment diagnosis:  right hip pain  Q47.463  Insurance/Certification information:   [facility]  20 visits/yr,  $40 copay  Physician Information:  Referring Practitioner: Dr Ros Thorne of care signed (Y/N): Y    Date of Patient follow up with Physician:     Is this a Progress Report:     []  Yes  [x]  No        If Yes:  Date Range for reporting period:  Beginnin2021  Endin2021    Progress report will be due (10 Rx or 30 days whichever is less):        Recertification will be due (POC Duration  / 90 days whichever is less):  90d        Visit # Insurance Allowable Auth Required   15 (18 total) 20 []  Yes [x]  No        Functional Scale:   LEFS:10% LOF   Date assessed:   2021    Latex Allergy:  [x]NO      []YES  Preferred Language for Healthcare:   [x]English       []other    Pain level:0/10 currently  1-2/10 at worst     SUBJECTIVE:   Pt notes her hip is feeling good but that she is having pain along the (L) side of her low back. Pt notes she is staying active but is not consistent with her exercises.     OBJECTIVE: 2021    Observation:     Test Measurements:  Hypomobility L5 > L4  Lumbar AROM:  Flex: 25% loss no pain  Ext: 75% loss pain in low back (25% loss after mobs)  Sidebending: (R): WNL no pain (L): 50% loss pain in low back          RESTRICTIONS/PRECAUTIONS:     Exercises/Interventions:               Therapeutic Activity                              Neuromuscular Re-education x 20     Ta activation with gentle UE perturbations @ 90 deg 1x20\"    Gentle Weight Shifting within restrictions To (R) LE 3'    NBOS on airex UE support as needed maintaining WB restrictions 20\" x 3 Added 6/21    Squats to table 2x10 Reviewed 7/22 cueing for hip hinge, glut activation   Mini Bosu Ball Lunge Iso With manual perturbations for Hip ER activation 2lb   20\" x 5 reps Added 7/8 focus on ER activation   SLS on airex 3 reps to fatigue  7/13/21   Quadruped Birddogs 10\" x 5 ea side Added 7/8 ^ 8/13 cueing for trunk control, minimizing compensation        Gait Training One crutch and no crutches X 5'   Standing Anti-Rotation Red 5\" x 10 ea side Added 7/20   Hip Hikes Standing 3\" 2 x 10 Added 7/20 (R) stance   Lateral Stepping Green Band 3 laps 10 steps out/back Added 7/29   Prone isometrics Multifid/glut LE @ 90/90 5x10\" Added 8/13   Standing March with ER iso into ball @ 90 10\" x 5 ea                                    Therapeutic Exercise  Exercises x 15'     TA contraction 10x10''    Supine Bridges w/ ecc lowering 2 x 10 Normal bridges 7/8 ^ 7/22   Hip Flexor Stretch 30\" x 3 Added 7/22   Prone IR/ER manual isometrics 3\" x 10 ea Added 6/21   Upright Bike 3' warm up    MH Hip Abduction 30lbs x 15 ea Added 7/8   Wall sit 4x to fatigue  7/13   Lateral step down 4\" 2 x10 7/13   Supine lumbar rotation 1x 8, 5 sec hold For low back pain soreness   Yoga ball lumbar strech 3x 30 sec Improved low back soreness   Leg Press DL 100lbs 3 x 10 Range: 80-10 Added 7/20 ^ 7/29 ^8/5   Step up/Over (R) 8\" x 10' Reviewed 8/6   SL Bridging 2x6 Added 8/6                          Manual x 10'     Lumbar Mobs Prone CPA and (L) UPA L4-L5 Gr II/III x 10'    Hip ROM/lat belt mobs     STM anterior hip/thigh     Ankle Mobs/PROM Grade-         Access Code: [email or web address]  URL: [email or web address]  Date: 08/13/2021  Prepared by: Michelle Madison    Exercises  Supine Transversus Abdominis Bracing with Heel Slide - 1 x daily - 7 x weekly - 1 sets - 10 reps - 10 hold  Supine Straight Leg Hip Adduction and Quad Set with Ball - 2 x daily - 7 x weekly - 1 sets - 10 reps - 10 hold  Hip Flexor Stretch at Edge of Bed - 2 x daily - 7 x weekly - 1 sets - 3-5 reps - 30 hold  Bridge Marching with Eccentric Lowering - 1 x daily - 3 x weekly - 3 sets - 10 reps  Wall Sit - 1 x daily - 3 x weekly - 1 sets - 3-5 reps - 15-20 hold  Side Stepping with Resistance at Thighs - 1 x daily - 3 x weekly - 1 sets - 3-5 reps  Bird Dog - 1 x daily - 4-5 x weekly - 2 sets - 5-10 reps - 10 hold  Standing Isometric Hip Abduction with Knee at 90 at Wall - 1 x daily - 4-5 x weekly - 2 sets - 10 reps - 5-10 hold          Therapeutic Exercise and NMR EXR  [x] (73586) Provided verbal/tactile cueing for activities related to strengthening, flexibility, endurance, ROM for improvements in LE, proximal hip, and core control with self care, mobility, lifting, ambulation.  [] (03321) Provided verbal/tactile cueing for activities related to improving balance, coordination, kinesthetic sense, posture, motor skill, proprioception  to assist with LE, proximal hip, and core control in self care, mobility, lifting, ambulation and eccentric single leg control.      NMR and Therapeutic Activities:    [x] (74580 or 48841) Provided verbal/tactile cueing for activities related to improving balance, coordination, kinesthetic sense, posture, motor skill, proprioception and motor activation to allow for proper function of core, proximal hip and LE with self care and ADLs  [x] (12474) Gait Re-education- Provided training and instruction to the patient for proper LE, core and proximal hip recruitment and positioning and eccentric body weight control with ambulation re-education including up and down stairs     Home Exercise Program:    [x] (21200) Reviewed/Progressed HEP activities related to strengthening, flexibility, endurance, ROM of core, proximal hip and LE for functional self-care, mobility, lifting and ambulation/stair navigation   [] (92463)Reviewed/Progressed HEP activities related to improving balance, coordination, kinesthetic sense, posture, motor skill, proprioception of core, proximal hip and LE for self care, mobility, lifting, and ambulation/stair navigation      Manual Treatments:  PROM / STM / Oscillations-Mobs:  G-I, II, III, IV (PA's, Inf., Post.)  [x] (24268) Provided manual therapy to mobilize LE, proximal hip and/or LS spine soft tissue/joints for the purpose of modulating pain, promoting relaxation,  increasing ROM, reducing/eliminating soft tissue swelling/inflammation/restriction, improving soft tissue extensibility and allowing for proper ROM for normal function with self care, mobility, lifting and ambulation. Modalities:  declined    Charges:  Timed Code Treatment Minutes: 45'   Total Treatment Minutes: 39'     [] EVAL (LOW) 09469 (typically 20 minutes face-to-face)  [] EVAL (MOD) 70614 (typically 30 minutes face-to-face)  [] EVAL (HIGH) 46335 (typically 45 minutes face-to-face)  [] RE-EVAL     [x] XR(08647) x 1  [] IONTO  [x] NMR (91777) x1    [] VASO  [x] Manual (68103) x1    [] Other:  [] TA x     [] Mech Traction (30695)  [] ES(attended) (43017)      [] ES (un) (12023):     GOALS:  Updated 6/2  Short Term Goals: To be achieved in: 2 week  1. Independent in HEP and progression per patient tolerance, in order to prevent re-injury. [] Progressing: [x] Met: [] Not Met: [] Adjusted   2. Patient will have a 50% or > decrease in pain at worse (R) hip in order for patient to progress per protocol in therapy   [] Progressing: [x] Met: [] Not Met: [] Adjusted     Long Term Goals: To be achieved in: 12 weeks  1. Patient will demonstrate full functional AROM of the (R) hip in all planes to help normalize gait pattern and for improved performance of daily activities. [] Progressing: [x] Met: [] Not Met: [] Adjusted   2. Patient will demonstrate a 50% or > improvement on her LEFS score for improved perceived functioning with daily activities.    [] Progressing: [] Met: [] Not Met: [] Adjusted   3. Patient will demonstrate proximal hip strength of 4/5 or > for adequate hip stability and improved performance of normal daily/work activities. [] Progressing: [] Met: [x] Not Met: [] Adjusted         Progression Towards Functional goals:  [x] Patient is progressing as expected towards functional goals listed. [] Progression is slowed due to complexities listed. [] Progression has been slowed due to co-morbidities. [] Plan just implemented, too soon to assess goals progression  [] Other:     ASSESSMENT:  Pt presented with low back pain this date on the (L) and had pain mainly with a closing pattern. Pain improved significantly with mobs and appropriate muscle activation exercise. Continued to progress proximal hip and multifidus activation exercise while progressing general functional strengthening. It is unknown how compliant pt is truly being at home, but her overall function and tolerance to exercise has progressed. Pain is minimal in the hip at this point. Pt is now down to two visits remaining per insurance. Pt's HEP updated this date to help bridge the gap between visits as pt will be seen again in two weeks. Pt follows up with her M.D. next week. Treatment/Activity Tolerance:  [] Patient tolerated treatment well [] Patient limited by fatique  [x] Patient limited by pain  [] Patient limited by other medical complications  [] Other:     Prognosis: [x] Good [] Fair  [] Poor    Patient Requires Follow-up: [x] Yes  [] No    PLAN: Re-assess, progress HEP/program as appropriate  [x] Continue per plan of care [x] Alter current plan (see comments)  [] Plan of care initiated [] Hold pending MD visit [] Discharge      Electronically signed by: Evangelist Scott PT, DPT       Note: If patient does not return for scheduled/ recommended follow up visits, this note will serve as a discharge from care along with most recent update on progress.

## 2021-08-18 ENCOUNTER — OFFICE VISIT (OUTPATIENT)
Dept: ORTHOPEDIC SURGERY | Age: 56
End: 2021-08-18

## 2021-08-18 VITALS — BODY MASS INDEX: 34.93 KG/M2 | WEIGHT: 185 LBS | HEIGHT: 61 IN

## 2021-08-18 DIAGNOSIS — Z98.890 STATUS POST HIP SURGERY: Primary | ICD-10-CM

## 2021-08-18 PROCEDURE — 99024 POSTOP FOLLOW-UP VISIT: CPT | Performed by: ORTHOPAEDIC SURGERY

## 2021-08-18 NOTE — LETTER
Physical Therapy Rehabilitation Referral    Patient Name: Kelley Cook MRN:   DOS: 8/18/2021     Diagnosis:   1.  Status post hip surgery          Precautions:     [x] Evaluate and Treat    Post Op Instructions:  [] Continuous passive motion (CPM)   [] AAROM: Flexion to 90   [] Uni-planar passive range of motion   [] AAROM: External rotation to 10   [] Hip brace on at all times    [] AAROM: Extension to 10   [] Hip brace when hip at risk     [] AAROM:  Neutral IR   [] Home exercise program (copy to patient)   [] AAROM: Abduction to 25    [] Isometric external rotator strengthening    [] Isometric glute max strengthening     [] Isometric abductor strengthening              Post-op Phases:  []Phase I: Maximum Protection (Day 1-3 Weeks)  []Phase II: Mobility & Neuromuscular Retraining (3-6 Weeks)  []Phase III: Phase III: Muscle Balance and Strengthening (6-12 Weeks)  [x]Phase IV: Functional Training of the Hip & Lower Extremity (12-18 Weeks)  []Phase V: Advanced Training  Specificity for Return to Sport and/or Work (18-24 [employer])    Non-Operative & Pre-Operative Rehabilitation:    Cardiovascular:            Deep Hip Rotators  [] Upright Bike (Baseline)           [] External Rotators AROM in 4PK (Baseline)  [] Walking (Progression 1)           [] Internal Rotators AROM in 4PK (Baseline)  [] Running (Progression 2)           [] ER in side lying (Progression 1)  [] Dynamic Loading (Progression 3)          [] IR in side lying (Progression 1)                [] ER with resistance in 4PK (Progression 2)                [] IR with resistance in 4PK (Progression 2)                 [] Lateral Step Up (Progression 3)  Positioning:  [] 4PK with pelvic tilts (Baseline)  [] Pelvic tilts in sitting (Progression 1)        Core:   [] Relaxation Breathing (Baseline)         [] [place] lying elbow presses (Progression 1)  [] Side Planks (Baseline)         [] Side planks + hip abduction (Progression 1)  [] Bird-Dog (Baseline) [] Bird-Dog with resistance (Progression 1)    Glute Complex:            Load Transfer:  [] Bridging (Baseline)             [] Weight Shifting (Baseline)     [] Single Leg Bridge (Progression 1)         [] Single Leg Stance to SEBT(Progression 1)     [] Single Leg Lower (Progression 2)          [] Hip hinge + monster walks (Progression 2)       Mobility:  [] Fredy position with breathing (baseline)  [] Hip Hinge with stick & neutral spine (baseline)  [] Sit to stand (baseline)  [] Hip Hinge without guidance (Progression 1)  [] Hip Hinge with resisted punch out guidance (Progression 2)      Pelvic Floor:  [] Relaxation breathing         Activities:       [] Rowing       [] Stepper/Exercise bike     [] Swimming  [] Water exercises    Modalities:     Return to Sport:  [x] Of Choice      [] Plyometrics  [] Ultrasound     [] Rhythmic stabilization  [] Iontophoresis    [] Core strengthening   [] Moist heat     [] Sports specific program:   [] Massage         [x] Cryotherapy      [] Electrical stimulation     [] Paraffin  [] Whirlpool  [] TENS    [x] Home exercise program (copy to patient). Perform exercises for:   15     minutes    3      times/day  [x] Supervised physical therapy  Frequency: []  1x week  [x] 2x week  [] 3x week  [] Other:   Duration: [] 2 weeks   [] 4 weeks  [x] 6 weeks  [] Other:     Additional Instructions:       Sincerely,    Blanca Houston MD [facility]  Hip Preservation & Sports Medicine Surgeon   [address] [facility]   [address], [place], [facility]  Email: [email or web address]  Office: [phone]

## 2021-08-18 NOTE — PROGRESS NOTES
History of Present Illness:  Alissa Ruggiero is a pleasant 63 y.o. female who presents for a post operative visit. She is 11.5 weeks out following a right hip arthroscopy, acetabuloplasty, labral repair, and fractional psoas lengthening. Overall She is doing great and feels very minimal discomfort. She has no pain with walking or sitting, and has been progressing quite well with PT. She has been back to work for 2 weeks, and has no discomfort during work besides with certain heavy pushing/pulling movements. She gets some thigh discomfort mid and distal at the end of a long work week. She denies fevers, chills, numbness, tingling, and shortness of breath. Medical History:  Patient's medications, allergies, past medical, surgical, social and family histories were reviewed and updated as appropriate. No notes on file    Review of Systems  A 14 point review of systems was completed by the patient and is available in the media section of the scanned medical record and was reviewed on 8/18/2021. Vital Signs: There were no vitals filed for this visit. General/Appearance: Alert and oriented and in no apparent distress. Skin:  There are no skin lesions, cellulitis, or extreme edema. The patient has warm and well-perfused Bilateral lower  extremities with brisk capillary refill. Hip  Exam: RIGHT    Inspection: Hip incision(s)  are completely healed. There is no erythema, drainage or other signs of infection    Palpation:  No crepitus to gentle motion / circumduction of the hip    Active Range of Motion: 0 to 100 deg flexion with normal active SLR and circumduction    Passive Range of Motion: 0 to 100 deg flexion with no pain. Negative Deep Flexion, FADIR, and CIARA    Strength:  4+/5 ABDuctor, adductor, hip flexion    Special Tests:  Normal FADIR, non painful single leg lunge, non painful squats on dynamic testing. .    Neurovascular: Sensation to light touch is intact, no motor deficits, palpable radial pulses 2+    Radiology:     No new XR obtained at this time. Assessment :  Ms. Rell Nur is a pleasant 63 y.o. patient who is a right hip arthroscopy, acetabuloplasty, labral repair, and fractional psoas lengthening. Impression:  Encounter Diagnosis   Name Primary?  Status post hip surgery Yes       Office Procedures:  No orders of the defined types were placed in this encounter. Treatment Plan:    Overall Rell Nur is doing great and is following the expected trajectory of improvement in pain and hip motion post arthroscopic surgery. We recommend that She continue with physical therapy Phase IV and 5 and transition to a home program. It is my medical opinion that she should continue on modified duty for at least 6 months post op. 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 All of her questions were fully answered today. We would like to see Rell Nur back in 3 months for follow-up visit. Sincerely,    Cris Trujillo MD [facility] and [address] [place] [address]   [address] [facility], [address]  Email: [email or web address]  Office: [phone]    08/18/21  4:00 PM

## 2021-08-18 NOTE — LETTER
[address]  [address] [facility] [address]  Phone: [phone]  Fax: [phone]    Riri Vegas MD        August 18, 2021     Patient: Moisés Cruz   YOB: 1965   Date of Visit: 8/18/2021       To Whom It May Concern: It is my medical opinion that Nancy Richey may return to work on 9/6/2021 with the following restrictions: continue restrictions of no excessive walking, standing, sitting for the long term. .    If you have any questions or concerns, please don't hesitate to call.     Sincerely,      Riri Vegas MD

## 2021-08-31 ENCOUNTER — HOSPITAL ENCOUNTER (OUTPATIENT)
Dept: PHYSICAL THERAPY | Age: 56
Setting detail: THERAPIES SERIES
Discharge: HOME | End: 2021-08-31
Payer: COMMERCIAL

## 2021-08-31 ENCOUNTER — OFFICE VISIT (OUTPATIENT)
Dept: FAMILY MEDICINE CLINIC | Age: 56
End: 2021-08-31
Payer: COMMERCIAL

## 2021-08-31 VITALS
TEMPERATURE: 97.8 F | SYSTOLIC BLOOD PRESSURE: 106 MMHG | DIASTOLIC BLOOD PRESSURE: 70 MMHG | RESPIRATION RATE: 12 BRPM | HEART RATE: 80 BPM | BODY MASS INDEX: 34.16 KG/M2 | OXYGEN SATURATION: 96 % | WEIGHT: 180.8 LBS

## 2021-08-31 DIAGNOSIS — R10.11 RUQ ABDOMINAL PAIN: ICD-10-CM

## 2021-08-31 DIAGNOSIS — R10.84 GENERALIZED ABDOMINAL PAIN: ICD-10-CM

## 2021-08-31 DIAGNOSIS — F33.0 MAJOR DEPRESSIVE DISORDER, RECURRENT EPISODE, MILD (HCC): Primary | ICD-10-CM

## 2021-08-31 DIAGNOSIS — Z12.11 SCREEN FOR COLON CANCER: ICD-10-CM

## 2021-08-31 DIAGNOSIS — K44.9 HIATAL HERNIA: ICD-10-CM

## 2021-08-31 DIAGNOSIS — F90.0 ATTENTION DEFICIT HYPERACTIVITY DISORDER (ADHD), PREDOMINANTLY INATTENTIVE TYPE: ICD-10-CM

## 2021-08-31 DIAGNOSIS — Z23 NEED FOR SHINGLES VACCINE: ICD-10-CM

## 2021-08-31 DIAGNOSIS — R73.9 HYPERGLYCEMIA: ICD-10-CM

## 2021-08-31 PROCEDURE — 3017F COLORECTAL CA SCREEN DOC REV: CPT | Performed by: FAMILY MEDICINE

## 2021-08-31 PROCEDURE — G8427 DOCREV CUR MEDS BY ELIG CLIN: HCPCS | Performed by: FAMILY MEDICINE

## 2021-08-31 PROCEDURE — 99214 OFFICE O/P EST MOD 30 MIN: CPT | Performed by: FAMILY MEDICINE

## 2021-08-31 PROCEDURE — 1036F TOBACCO NON-USER: CPT | Performed by: FAMILY MEDICINE

## 2021-08-31 PROCEDURE — 97140 MANUAL THERAPY 1/> REGIONS: CPT

## 2021-08-31 PROCEDURE — G8417 CALC BMI ABV UP PARAM F/U: HCPCS | Performed by: FAMILY MEDICINE

## 2021-08-31 PROCEDURE — 97110 THERAPEUTIC EXERCISES: CPT

## 2021-08-31 PROCEDURE — 90471 IMMUNIZATION ADMIN: CPT | Performed by: FAMILY MEDICINE

## 2021-08-31 PROCEDURE — 90750 HZV VACC RECOMBINANT IM: CPT | Performed by: FAMILY MEDICINE

## 2021-08-31 RX ORDER — METHYLPHENIDATE HYDROCHLORIDE 36 MG/1
36 TABLET ORAL EVERY MORNING
Qty: 30 TABLET | Refills: 0 | Status: SHIPPED | OUTPATIENT
Start: 2021-09-30 | End: 2021-11-05 | Stop reason: SDUPTHER

## 2021-08-31 RX ORDER — METHYLPHENIDATE HYDROCHLORIDE 36 MG/1
36 TABLET ORAL EVERY MORNING
Qty: 30 TABLET | Refills: 0 | Status: SHIPPED | OUTPATIENT
Start: 2021-08-31 | End: 2021-11-05 | Stop reason: SDUPTHER

## 2021-08-31 RX ORDER — METHYLPHENIDATE HYDROCHLORIDE 36 MG/1
36 TABLET ORAL EVERY MORNING
Qty: 30 TABLET | Refills: 0 | Status: SHIPPED | OUTPATIENT
Start: 2021-10-30 | End: 2021-11-05 | Stop reason: SDUPTHER

## 2021-08-31 NOTE — PROGRESS NOTES
Here for f/u, pt states that things are doing ok but with some mild increased stressors related to a cousin that just passed away from complications of COVID. Pt is going to attend the  but has concerns of safety. Pt has been vaccinated. Pt feels that she is managing the stress    Here for follow up of ADD. Pt states that they are doing very well with current therapy and feels that control of symptoms are adequate at this time. No breakthru symptoms and no need/indication for adjustment in therapy. Taking meds as prescribed and denies any illicit medication usage of misuse of their stimulant thearpy. Mood is stable and denies any issues of depression or anxiety associated with treatment of ADD. Pt states that with change of PPI to protonix, sx have improved significantly. Pt does feel better, and feels it is doing better than the other PPI's. Pt is not having any dyshpagia, and no n/v.  Pt feels some issues with getting full quicker but resolved with protonix. Pt does notice that when she leans forward, can rarely get a pain in RUQ. Feels a fullness in area. When it occurs, will last for 1-2 min and then goes away. It is sharp but short acting. No n/v. No fever, chills. Except as noted above in the history of present illness, the review of systems is  negative for headache, vision changes, chest pain, shortness of breath, abdominal pain, urinary sx, bowel changes. Past medical, surgical, and social history reviewed and updated  Medications and allergies reviewed and updated        O: /70   Pulse 80   Temp 97.8 °F (36.6 °C) (Temporal)   Resp 12   Wt 180 lb 12.8 oz (82 kg)   SpO2 96%   BMI 34.16 kg/m²   GEN: No acute distress, cooperative, well nourished, alert. HEENT: PEERLA, EOMI , normocephalic/atraumatic, nares and oropharynx clear. Mucous membranes normal, Tympanic membranes clear bilaterally.   Neck: soft, supple, no thyromegaly, mass, no Lymphadenopathy  CV: Regular rate and rhythm, no murmur, rubs, gallops. No edema. Resp: Clear to auscultation bilaterally good air entry bilaterally  No crackles, wheeze. Breathing comfortably. Psych: mood stable, No suicidal thoughts or ideation   Abd: soft, mild generalized tender to palpation. normoactive bowels sounds. No hepatosplenomegaly  No costovertebral angle tenderness, mass        Current Outpatient Medications   Medication Sig Dispense Refill    methylphenidate (CONCERTA) 36 MG extended release tablet Take 1 tablet by mouth every morning for 30 days. 30 tablet 0    [START ON 9/30/2021] methylphenidate (CONCERTA) 36 MG extended release tablet Take 1 tablet by mouth every morning for 30 days. 30 tablet 0    [START ON 10/30/2021] methylphenidate (CONCERTA) 36 MG extended release tablet Take 1 tablet by mouth every morning for 30 days. 30 tablet 0    desvenlafaxine succinate (PRISTIQ) 50 MG TB24 extended release tablet Take 1 tablet by mouth daily 30 tablet 5    pantoprazole (PROTONIX) 40 MG tablet Take 1 tablet by mouth every morning (before breakfast) 30 tablet 5    albuterol sulfate HFA (PROVENTIL HFA) 108 (90 Base) MCG/ACT inhaler Inhale 2 puffs into the lungs every 6 hours as needed for Wheezing 1 Inhaler 3    hydrOXYzine (ATARAX) 25 MG tablet Take 1 tablet by mouth every 8 hours as needed for Anxiety (Patient not taking: Reported on 8/31/2021) 30 tablet 5     No current facility-administered medications for this visit. ASSESSMENT / PLAN:    1. Attention deficit hyperactivity disorder (ADHD), predominantly inattentive type  Stable w/ concerta  refills given as below  Pt aware of need for every 3 month medication followup appointments, and that medication refills for benzodiazepines, narcotics and/or stimulants will only be given at appointment. - methylphenidate (CONCERTA) 36 MG extended release tablet; Take 1 tablet by mouth every morning for 30 days. Dispense: 30 tablet;  Refill: 0  - methylphenidate (CONCERTA) 36 MG extended release tablet; Take 1 tablet by mouth every morning for 30 days. Dispense: 30 tablet; Refill: 0  - methylphenidate (CONCERTA) 36 MG extended release tablet; Take 1 tablet by mouth every morning for 30 days. Dispense: 30 tablet; Refill: 0    2. Major depressive disorder, recurrent episode, mild (HCC)  Mood stable    3. RUQ abdominal pain  Normal CT, improved with PPI therapy  Refer to GI for eval/EGD  Management pending results. 4. Generalized abdominal pain  Normal CT 2/2020, does have hx of pancreatic cyst but prior CT neg and bloodwork normal  Refer GI for EGd/colonoscopy   - Sara Franco MD, Gastroenterology, [facility]    5. Hiatal hernia  - Sara Franco MD, Gastroenterology, [facility]    6. Hyperglycemia  a1c stable    7. Screen for colon cancer  - Sara Franco MD, Gastroenterology, [facility]    8. Need for shingles vaccine  Pt is due for update on shingles vaccine. Pt is given information on Shingrix vaccine, need for 2 doses spaced 2-6 months apart, and that due to medicare requirements, they may need to get vaccine at pharmacy. Pt given information/prescription if appropriate. Limited stock availability also discussed. given shingrix # 1 today           Follow-up appointment:   3 mos/prn  Pending GI referral/eval    Discussed use, benefit, and side effects of all prescribed medications. Barriers to medication compliance addressed. All patient questions answered. Pt voiced understanding. When applicable, patient's outside records were reviewed through [employer]. The patient has signed appropriate paperworks/consents.

## 2021-08-31 NOTE — PLAN OF CARE
Dragan Aj   Physical Therapy Re-Certification Plan of Care    Dear  Dr. Jack Us,    We had the pleasure of treating the following patient for physical therapy services at [address] [place] [address]. A summary of our findings can be found in the updated assessment below. This includes our plan of care. If you have any questions or concerns regarding these findings, please do not hesitate to contact me at the office phone number checked above. Thank you for the referral.     Physician Signature:________________________________Date:__________________  By signing above (or electronic signature), therapists plan is approved by physician      Functional Outcome:  LEFS 10% LOF    Overall Response to Treatment:   [x]Patient is responding well to treatment and improvement is noted with regards  to goals   []Patient should continue to improve in reasonable time if they continue HEP   []Patient has plateaued and is no longer responding to skilled PT intervention    []Patient is getting worse and would benefit from return to referring MD   []Patient unable to adhere to initial POC   []Other: *    Date range of Visits: 2021-2021  Total Visits: 19    Recommendation:    [x]Continue 1 final visit in 2 weeks.     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  · Treatment Diagnosis: PT treatment diagnosis:  right hip pain  L06.431  Insurance/Certification information:   [employer]  20 visits/yr,  $40 copay  Physician Information:  Referring Practitioner: Dr Ambrose Joe of care signed (Y/N): Y    Date of Patient follow up with Physician:     Is this a Progress Report:     [x]  Yes  []  No        If Yes: Date Range for reporting period:  Beginnin2021  Endin2021    Progress report will be due (10 Rx or 30 days whichever is less):        Recertification will be due (POC Duration  / 90 days whichever is less):  90d        Visit # Insurance Allowable Auth Required   16 (19 total) 20 []  Yes [x]  No        Functional Scale:   LEFS:10% LOF   Date assessed:   2021    Latex Allergy:  [x]NO      []YES  Preferred Language for Healthcare:   [x]English       []other    Pain level:0/10 currently    SUBJECTIVE:   Pt states she is doing well. Pt saw Dr. Pearl Bedolla and was put on \"light duty\" at work but states there is no light duty. Pt states she is back to normal function other than difficulty lifting her (R) LE to put on pants. Pt notes mild discomfort in the anterior hip with this activity.   Pt notes no other functional limitation at this time and minimal pain    OBJECTIVE: 2021    Observation:  Functional Squat: WNL, good control to 90+ deg    Balance: SLS > 30 (B)    Test Measurements:  Hip AROM:  Hip Flex: (L): 101   (R): 100 deg (110 deg passively)  Hip: IR:  (L): 60 deg   (R): 58 deg  Hip ER: (L): 15 deg   (R): 12 deg  Hip Ext: (L): 4 deg   (R): 3 deg      Strength:  Hip Flex: 5/5 (B)  Hip ER: 4+/5 (B)  Hip Abd: 4/5 (B)  Quads: 5/5 (B)  Hamstrings: 5/5 (B)          RESTRICTIONS/PRECAUTIONS:     Exercises/Interventions:               Therapeutic Activity                              Neuromuscular Re-education x      Ta activation with gentle UE perturbations @ 90 deg 1x20\"    Gentle Weight Shifting within restrictions To (R) LE 3'    NBOS on airex UE support as needed maintaining WB restrictions 20\" x 3 Added     Squats to table 2x10 Reviewed  cueing for hip hinge, glut activation   Mini Bosu Ball Lunge Iso With manual perturbations for Hip ER activation 2lb   20\" x 5 reps Added  focus on ER activation   SLS on airex 3 reps to fatigue  21   Quadruped Birddogs 10\" x 5 ea side Added 7/8 ^ 8/13 cueing for trunk control, minimizing compensation        Gait Training One crutch and no crutches X 5'   Standing Anti-Rotation Red 5\" x 10 ea side Added 7/20   Hip Hikes Standing 3\" 2 x 10 Added 7/20 (R) stance   Lateral Stepping Green Band 3 laps 10 steps out/back Added 7/29   Prone isometrics Multifid/glut LE @ 90/90 5x10\" Added 8/13   Standing March with ER iso into ball @ 90 10\" x 5 ea                                    Therapeutic Exercise  Exercises x 10'     TA contraction 10x10''    Supine Bridges w/ ecc lowering 2 x 10 Normal bridges 7/8 ^ 7/22   Hip Flexor Stretch 30\" x 3 Added 7/22   Prone IR/ER manual isometrics 3\" x 10 ea Added 6/21   Upright Bike 3' warm up    MH Hip Abduction 30lbs x 15 ea Added 7/8   Wall sit 4x to fatigue  7/13   Lateral step down 4\" 2 x10 7/13   Supine lumbar rotation 1x 8, 5 sec hold For low back pain soreness   Yoga ball lumbar strech 3x 30 sec Improved low back soreness   Leg Press DL 100lbs 3 x 10 Range: 80-10 Added 7/20 ^ 7/29 ^8/5   Step up/Over (R) 8\" x 10' Reviewed 8/6   SL Bridging 2x6 Added 8/6   Half Kneel Hip Flexor Stretch (R) 30\" x 3 Added 8/31                     Manual x 15'     Lumbar Mobs Prone CPA and (L) UPA L4-L5 Gr II/III x 10'    Hip ROM/lat belt mobs     STM anterior hip/thigh Hip Flexors (L) x 10'    PROM Hip Flexion (R) 5'      Access Code: [email or web address]  URL: [email or web address]  Date: 08/31/2021  Prepared by: Marguerite Calderon    Exercises  Supine Transversus Abdominis Bracing with Heel Slide - 1 x daily - 7 x weekly - 1 sets - 10 reps - 10 hold  Supine Straight Leg Hip Adduction and Quad Set with Ball - 2 x daily - 7 x weekly - 1 sets - 10 reps - 10 hold  Wall Sit - 1 x daily - 3 x weekly - 1 sets - 3-5 reps - 15-20 hold  Side Stepping with Resistance at Thighs - 1 x daily - 3 x weekly - 1 sets - 3-5 reps  Bird Dog - 1 x daily - 4-5 x weekly - 2 sets - 5-10 reps - 10 hold  Standing Isometric Hip Abduction with Knee at 90 at [facility] - 1 x daily - 4-5 x weekly - 2 sets - 10 reps - 5-10 hold  Single Leg Bridge - 1 x daily - 3 x weekly - 2 sets - 10 reps  Half Kneeling Hip Flexor Stretch - 1 x daily - 7 x weekly - 1 sets - 3-5 reps - 30 hold          Therapeutic Exercise and NMR EXR  [x] (27150) Provided verbal/tactile cueing for activities related to strengthening, flexibility, endurance, ROM for improvements in LE, proximal hip, and core control with self care, mobility, lifting, ambulation.  [] (29992) Provided verbal/tactile cueing for activities related to improving balance, coordination, kinesthetic sense, posture, motor skill, proprioception  to assist with LE, proximal hip, and core control in self care, mobility, lifting, ambulation and eccentric single leg control.      NMR and Therapeutic Activities:    [x] (52174 or 50225) Provided verbal/tactile cueing for activities related to improving balance, coordination, kinesthetic sense, posture, motor skill, proprioception and motor activation to allow for proper function of core, proximal hip and LE with self care and ADLs  [x] (20691) Gait Re-education- Provided training and instruction to the patient for proper LE, core and proximal hip recruitment and positioning and eccentric body weight control with ambulation re-education including up and down stairs     Home Exercise Program:    [x] (76817) Reviewed/Progressed HEP activities related to strengthening, flexibility, endurance, ROM of core, proximal hip and LE for functional self-care, mobility, lifting and ambulation/stair navigation   [] (54689)Reviewed/Progressed HEP activities related to improving balance, coordination, kinesthetic sense, posture, motor skill, proprioception of core, proximal hip and LE for self care, mobility, lifting, and ambulation/stair navigation      Manual Treatments:  PROM / STM / Oscillations-Mobs:  G-I, II, III, IV (PA's, Inf., Post.)  [x] (20145) Provided manual therapy to mobilize LE, proximal hip and/or LS spine soft tissue/joints for the purpose of modulating pain, promoting relaxation,  increasing ROM, reducing/eliminating soft tissue swelling/inflammation/restriction, improving soft tissue extensibility and allowing for proper ROM for normal function with self care, mobility, lifting and ambulation. Modalities:  declined    Charges:  Timed Code Treatment Minutes: 25'   Total Treatment Minutes: 40'     [] EVAL (LOW) 455 1011 (typically 20 minutes face-to-face)  [] EVAL (MOD) 45706 (typically 30 minutes face-to-face)  [] EVAL (HIGH) 09101 (typically 45 minutes face-to-face)  [] RE-EVAL     [x] VL(05614) x 1  [] IONTO  [] NMR (98853) x1  [] VASO  [x] Manual (96917) x1    [] Other:  [] TA x     [] Mech Traction (74766)  [] ES(attended) (49677)      [] ES (un) (65499):     GOALS:  Updated 6/2  Short Term Goals: To be achieved in: 2 week  1. Independent in HEP and progression per patient tolerance, in order to prevent re-injury. [] Progressing: [x] Met: [] Not Met: [] Adjusted   2. Patient will have a 50% or > decrease in pain at worse (R) hip in order for patient to progress per protocol in therapy   [] Progressing: [x] Met: [] Not Met: [] Adjusted     Long Term Goals: To be achieved in: 12 weeks  1. Patient will demonstrate full functional AROM of the (R) hip in all planes to help normalize gait pattern and for improved performance of daily activities. [] Progressing: [x] Met: [] Not Met: [] Adjusted   2. Patient will demonstrate a 50% or > improvement on her LEFS score for improved perceived functioning with daily activities. [] Progressing: [x] Met: [] Not Met: [] Adjusted   3. Patient will demonstrate proximal hip strength of 4/5 or > for adequate hip stability and improved performance of normal daily/work activities. [] Progressing: [x] Met: [] Not Met: [] Adjusted         Progression Towards Functional goals:  [x] Patient is progressing as expected towards functional goals listed.     [] Progression is slowed due to complexities listed. [] Progression has been slowed due to co-morbidities. [] Plan just implemented, too soon to assess goals progression  [] Other:     ASSESSMENT:  At this time, the pt has met her stated functional goals. Pt demonstrates near symmetrical hip strength and mobility between (L) and (R). Pt's only apparent issue currently is mild hip flexor/rectus femoris restrictions that reproduce pt's reported \"soreness\" with palpation. Upated HEP to include more progressive hip flexor stretch, however it is expected that this will resolve with time as well. In general, pt is doing extremely well and has successfully transitioned back to work and is performing her normal ADLs without restriction. Focused on re-assessment and addressing hip flexor restrictions/mobility of the involved hip this date. Pt currently has one remaining visit per insurance. Pt to be seen one final visit in 2 weeks to re-assess mild discomfort/tightness in hip and to review/progress HEP as appropriate. Pt is in agreement with this plan. Treatment/Activity Tolerance:  [] Patient tolerated treatment well [] Patient limited by fatique  [x] Patient limited by pain  [] Patient limited by other medical complications  [] Other:     Prognosis: [x] Good [] Fair  [] Poor    Patient Requires Follow-up: [x] Yes  [] No    PLAN: Re-assess, plan to D/c NPV  [x] Continue per plan of care [x] Alter current plan (see comments)  [] Plan of care initiated [] Hold pending MD visit [] Discharge      Electronically signed by: Cordelia Guzman PT, DPT       Note: If patient does not return for scheduled/ recommended follow up visits, this note will serve as a discharge from care along with most recent update on progress.

## 2021-09-10 ENCOUNTER — TELEPHONE (OUTPATIENT)
Dept: FAMILY MEDICINE CLINIC | Age: 56
End: 2021-09-10

## 2021-09-10 NOTE — TELEPHONE ENCOUNTER
Zaira Cobb called to ask if we could resend the Referral to the Gastroenterologist. She said that she called us to ask us to resend it last week and that we said we had. She thinks it was Tuesday but isn't sure. I resent the referral again just to be safe and Abdelrahman Hughesing says that she will call them on Monday. If the office still continues to [place] the runaround\" she will give us a call to have referral sent somewhere else.      Faxed to [phone]

## 2021-09-14 ENCOUNTER — APPOINTMENT (OUTPATIENT)
Dept: PHYSICAL THERAPY | Age: 56
End: 2021-09-14
Payer: COMMERCIAL

## 2021-09-27 ENCOUNTER — TELEPHONE (OUTPATIENT)
Dept: FAMILY MEDICINE CLINIC | Age: 56
End: 2021-09-27

## 2021-09-27 NOTE — TELEPHONE ENCOUNTER
Patient states she's a 1000% sure she has a UTI, burning, urgency and oliguria can you call her something in or will she need a appointment. Please advise. Thanks.

## 2021-09-29 ENCOUNTER — OFFICE VISIT (OUTPATIENT)
Dept: FAMILY MEDICINE CLINIC | Age: 56
End: 2021-09-29
Payer: COMMERCIAL

## 2021-09-29 ENCOUNTER — HOSPITAL ENCOUNTER (OUTPATIENT)
Dept: PHYSICAL THERAPY | Age: 56
Setting detail: THERAPIES SERIES
Discharge: HOME | End: 2021-09-29
Payer: COMMERCIAL

## 2021-09-29 VITALS
DIASTOLIC BLOOD PRESSURE: 80 MMHG | WEIGHT: 181.4 LBS | OXYGEN SATURATION: 96 % | TEMPERATURE: 97.8 F | HEART RATE: 88 BPM | SYSTOLIC BLOOD PRESSURE: 128 MMHG | BODY MASS INDEX: 34.28 KG/M2 | RESPIRATION RATE: 18 BRPM

## 2021-09-29 DIAGNOSIS — N39.0 RECURRENT UTI: ICD-10-CM

## 2021-09-29 DIAGNOSIS — R73.9 HYPERGLYCEMIA: ICD-10-CM

## 2021-09-29 DIAGNOSIS — K44.9 HIATAL HERNIA: ICD-10-CM

## 2021-09-29 DIAGNOSIS — R21 SKIN RASH: ICD-10-CM

## 2021-09-29 DIAGNOSIS — Z12.11 SCREEN FOR COLON CANCER: ICD-10-CM

## 2021-09-29 DIAGNOSIS — R39.9 UTI SYMPTOMS: Primary | ICD-10-CM

## 2021-09-29 LAB
BILIRUBIN, POC: NEGATIVE
BLOOD URINE, POC: NORMAL
CLARITY, POC: CLEAR
COLOR, POC: YELLOW
GLUCOSE URINE, POC: NEGATIVE
KETONES, POC: NEGATIVE
LEUKOCYTE EST, POC: NEGATIVE
NITRITE, POC: NEGATIVE
PH, POC: 6
PROTEIN, POC: NEGATIVE
SPECIFIC GRAVITY, POC: 1.02
UROBILINOGEN, POC: 0.2

## 2021-09-29 PROCEDURE — 1036F TOBACCO NON-USER: CPT | Performed by: FAMILY MEDICINE

## 2021-09-29 PROCEDURE — 81002 URINALYSIS NONAUTO W/O SCOPE: CPT | Performed by: FAMILY MEDICINE

## 2021-09-29 PROCEDURE — 97140 MANUAL THERAPY 1/> REGIONS: CPT

## 2021-09-29 PROCEDURE — G8417 CALC BMI ABV UP PARAM F/U: HCPCS | Performed by: FAMILY MEDICINE

## 2021-09-29 PROCEDURE — 99214 OFFICE O/P EST MOD 30 MIN: CPT | Performed by: FAMILY MEDICINE

## 2021-09-29 PROCEDURE — 97110 THERAPEUTIC EXERCISES: CPT

## 2021-09-29 PROCEDURE — G8427 DOCREV CUR MEDS BY ELIG CLIN: HCPCS | Performed by: FAMILY MEDICINE

## 2021-09-29 PROCEDURE — 97112 NEUROMUSCULAR REEDUCATION: CPT

## 2021-09-29 PROCEDURE — 3017F COLORECTAL CA SCREEN DOC REV: CPT | Performed by: FAMILY MEDICINE

## 2021-09-29 RX ORDER — CIPROFLOXACIN 500 MG/1
500 TABLET, FILM COATED ORAL DAILY PRN
Qty: 30 TABLET | Refills: 2 | Status: SHIPPED | OUTPATIENT
Start: 2021-09-29

## 2021-09-29 NOTE — PLAN OF CARE
[address]   Physical Therapy Discharge Summary    Dear  Dr. Ramses Carranza,    We had the pleasure of treating the following patient for physical therapy services at [address] [place] [address]. A summary of our findings can be found in the updated assessment below. This includes our plan of care. If you have any questions or concerns regarding these findings, please do not hesitate to contact me at the office phone number checked above.   Thank you for the referral.     Physician Signature:________________________________Date:__________________  By signing above (or electronic signature), therapists plan is approved by physician      Functional Outcome:  LEFS 6% LOF    Overall Response to Treatment:   []Patient is responding well to treatment and improvement is noted with regards  to goals   []Patient should continue to improve in reasonable time if they continue HEP   []Patient has plateaued and is no longer responding to skilled PT intervention    []Patient is getting worse and would benefit from return to referring MD   []Patient unable to adhere to initial POC   [x]Other:  Pt has met stated functional goals and appropriate for d/c to her HEP    Date range of Visits: 2021-2021  Total Visits: 20    Recommendation:    [x]Discharge to independent HEP   []          Date:  2021    Patient Name:  Tello Collins    :  1965  MRN: [id number]  Restrictions/Precautions:    Medical/Treatment Diagnosis Information:  · Diagnosis: M25.851, M25.751 (ICD-10-CM) - 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  · Treatment Diagnosis: PT treatment diagnosis:  right hip pain  D32.774  Insurance/Certification information:   [facility]  20 visits/yr,  $40 copay  Physician Information:  Referring Practitioner: Dr Nohemi Rodney of care signed (Y/N): Y    Date of Patient follow up with Physician:     Is this a Progress Report:     [x] Yes  []  No        If Yes:  Date Range for reporting period:  Beginnin2021  Endin2021    Progress report will be due (10 Rx or 30 days whichever is less):        Recertification will be due (POC Duration  / 90 days whichever is less):  90d        Visit # Insurance Allowable Auth Required   17 (20 total) 20 []  Yes [x]  No        Functional Scale:  LEFS:6% LOF   Date assessed:   2021    Latex Allergy:  [x]NO      []YES  Preferred Language for Healthcare:   [x]English       []other    Pain level:0/10 currently    SUBJECTIVE:   Pt states she has been working and has been increasing her activity level. Pt states she feels \"out of shape. \"   Pt states she is back to all normal activities. Pt notes she can still tell a difference as far as endurance on the (R) side and that she can get mild soreness, but no significant pain. Pt feels she is ready for d/c to an independent HEP.       OBJECTIVE:     Observation:  Functional Squat: WNL, good control to 90+ deg    Balance: SLS > 30 (B)    Test Measurements:  Hip AROM:  Hip Flex: (L): 101   (R): 105 deg (110 deg passively) (mild discomfort anterior)  Hip: IR:  (L): 60 deg   (R): 58 deg  Hip ER: (L): 15 deg   (R): 12 deg  Hip Ext: (L): 4 deg   (R): 3 deg      Strength:  Hip Flex: 5/5 (B)  Hip ER: 5/5 (B)  Hip Abd: 4+/5 (B)  Quads: 5/5 (B)  Hamstrings: 5/5 (B)    Y-Balance Test:   Anterior: (L): 46cm (R):42cm 9% deficit   Posterior lateral   (L) 62cm (R):80cm 23% surplus   Posterior Medial  (L):82 cm (R): 84  3% Surplus    Balance:    SLS > 20s        RESTRICTIONS/PRECAUTIONS:     Exercises/Interventions:               Therapeutic Activity                              Neuromuscular Re-education x 10'     Ta activation with gentle UE perturbations @ 90 deg 1x20\"    Gentle Weight Shifting within restrictions To (R) LE 3'    NBOS on airex UE support as needed maintaining WB restrictions 20\" x 3 Added 6/    Squats to table 2x10 Reviewed  cueing for hip hinge, glut activation   Mini Bosu Ball Lunge Iso With manual perturbations for Hip ER activation 2lb   20\" x 5 reps Added 7/8 focus on ER activation   SLS on airex 3 reps to fatigue  7/13/21   Quadruped Birddogs 10\" x 5 ea side Added 7/8 ^ 8/13 cueing for trunk control, minimizing compensation        Gait Training One crutch and no crutches X 5'   Standing Anti-Rotation Red 5\" x 10 ea side Added 7/20   Hip Hikes Standing 3\" 2 x 10 Added 7/20 (R) stance   Lateral Stepping Blue Band 3 laps 10 steps out/back ^ 9/29   Prone isometrics Multifid/glut LE @ 90/90 5x10\" Added 8/13   Standing March with ER iso into ball @ 90 10\" x 5 ea                                    Therapeutic Exercise  Exercises x 15'     TA contraction 10x10''    Supine Bridges w/ ecc lowering 2 x 10 Normal bridges 7/8 ^ 7/22   Hip Flexor Stretch 30\" x 3 Added 7/22   Prone IR/ER manual isometrics 3\" x 10 ea Added 6/21   Upright Bike 3' warm up    MH Hip Abduction 30lbs x 15 ea Added 7/8   Wall sit 3x to fatigue  7/13   Lateral step down 4\" 2 x10 7/13   Supine lumbar rotation 1x 8, 5 sec hold For low back pain soreness   Yoga ball lumbar strech 3x 30 sec Improved low back soreness   Leg Press DL 100lbs 3 x 10 Range: 80-10 Added 7/20 ^ 7/29 ^8/5   Step up/Over (R) 8\" x 10' Reviewed 8/6   SL Bridging 2x10 ea side Added 8/6 ^ 9/29   Half Kneel Hip Flexor Stretch (R) 30\" x 3 Added 8/31                     Manual x 15'     Lumbar Mobs Prone CPA and (L) UPA L4-L5 Gr II/III x 10'    Hip ROM/lat belt mobs     IASTM anterior hip/thigh Hip Flexors (L) x 10'    Manual Stretching 5'      Access Code: [email or web address]  URL: [email or web address]  Date: 08/31/2021  Prepared by: Henny Apa    Exercises  Supine Transversus Abdominis Bracing with Heel Slide - 1 x daily - 7 x weekly - 1 sets - 10 reps - 10 hold  Supine Straight Leg Hip Adduction and Quad Set with Ball - 2 x daily - 7 x weekly - 1 sets - 10 reps - 10 hold  Wall Sit - 1 x daily - 3 x weekly - 1 sets - 3-5 reps - 15-20 hold  Side Stepping with Resistance at Thighs - 1 x daily - 3 x weekly - 1 sets - 3-5 reps  Bird Dog - 1 x daily - 4-5 x weekly - 2 sets - 5-10 reps - 10 hold  Standing Isometric Hip Abduction with Knee at 90 at Wall - 1 x daily - 4-5 x weekly - 2 sets - 10 reps - 5-10 hold  Single Leg Bridge - 1 x daily - 3 x weekly - 2 sets - 10 reps  Half Kneeling Hip Flexor Stretch - 1 x daily - 7 x weekly - 1 sets - 3-5 reps - 30 hold          Therapeutic Exercise and NMR EXR  [x] (39972) Provided verbal/tactile cueing for activities related to strengthening, flexibility, endurance, ROM for improvements in LE, proximal hip, and core control with self care, mobility, lifting, ambulation.  [] (57440) Provided verbal/tactile cueing for activities related to improving balance, coordination, kinesthetic sense, posture, motor skill, proprioception  to assist with LE, proximal hip, and core control in self care, mobility, lifting, ambulation and eccentric single leg control.      NMR and Therapeutic Activities:    [x] (67164 or 37385) Provided verbal/tactile cueing for activities related to improving balance, coordination, kinesthetic sense, posture, motor skill, proprioception and motor activation to allow for proper function of core, proximal hip and LE with self care and ADLs  [x] (60823) Gait Re-education- Provided training and instruction to the patient for proper LE, core and proximal hip recruitment and positioning and eccentric body weight control with ambulation re-education including up and down stairs     Home Exercise Program:    [x] (00039) Reviewed/Progressed HEP activities related to strengthening, flexibility, endurance, ROM of core, proximal hip and LE for functional self-care, mobility, lifting and ambulation/stair navigation   [] (69428)Reviewed/Progressed HEP activities related to improving balance, coordination, kinesthetic sense, posture, motor skill, proprioception of core, proximal hip and LE for self care, mobility, lifting, and ambulation/stair navigation      Manual Treatments:  PROM / STM / Oscillations-Mobs:  G-I, II, III, IV (PA's, Inf., Post.)  [x] (15814) Provided manual therapy to mobilize LE, proximal hip and/or LS spine soft tissue/joints for the purpose of modulating pain, promoting relaxation,  increasing ROM, reducing/eliminating soft tissue swelling/inflammation/restriction, improving soft tissue extensibility and allowing for proper ROM for normal function with self care, mobility, lifting and ambulation. Modalities:  declined    Charges:  Timed Code Treatment Minutes: 40'   Total Treatment Minutes: 54'     [] EVAL (LOW) 455 1011 (typically 20 minutes face-to-face)  [] EVAL (MOD) 26185 (typically 30 minutes face-to-face)  [] EVAL (HIGH) 59724 (typically 45 minutes face-to-face)  [] RE-EVAL     [x] FY(26256) x 1  [] IONTO  [x] NMR (61571) x1  [] VASO  [x] Manual (64014) x1    [] Other:  [] TA x     [] Mech Traction (65750)  [] ES(attended) (93485)      [] ES (un) (73802):     GOALS:  Updated 6/2  Short Term Goals: To be achieved in: 2 week  1. Independent in HEP and progression per patient tolerance, in order to prevent re-injury. [] Progressing: [x] Met: [] Not Met: [] Adjusted   2. Patient will have a 50% or > decrease in pain at worse (R) hip in order for patient to progress per protocol in therapy   [] Progressing: [x] Met: [] Not Met: [] Adjusted     Long Term Goals: To be achieved in: 12 weeks  1. Patient will demonstrate full functional AROM of the (R) hip in all planes to help normalize gait pattern and for improved performance of daily activities. [] Progressing: [x] Met: [] Not Met: [] Adjusted   2. Patient will demonstrate a 50% or > improvement on her LEFS score for improved perceived functioning with daily activities. [] Progressing: [x] Met: [] Not Met: [] Adjusted   3.  Patient will demonstrate proximal hip strength of 4/5 or > for adequate hip stability and improved performance of normal daily/work activities. [] Progressing: [x] Met: [] Not Met: [] Adjusted         Progression Towards Functional goals:  [x] Patient is progressing as expected towards functional goals listed. [] Progression is slowed due to complexities listed. [] Progression has been slowed due to co-morbidities. [] Plan just implemented, too soon to assess goals progression  [] Other:     ASSESSMENT:  Pt demonstrates symmetrical hip/general LE strength and did well with a Y-Balance test this date. Pt is back to work activities and doing well. Pt notes minimal pain and is back to her other normal daily activities. Pt noted non-compliance with her HEP between last session and today. Re-educated pt on the importance of continuing to [facility] despite being discharged form formal therapy. At this time, the pt has met her stated functional goals and is appropriate for d/c to her independent HEP. Pt is in agreement with this plan. Treatment/Activity Tolerance:  [x] Patient tolerated treatment well [] Patient limited by fatique  [] Patient limited by pain  [] Patient limited by other medical complications  [] Other:     Prognosis: [x] Good [] Fair  [] Poor    Patient Requires Follow-up: [] Yes  [x] No    PLAN:   [] Continue per plan of care [] Alter current plan (see comments)  [] Plan of care initiated [] Hold pending MD visit [x] Discharge      Electronically signed by: Pedro Guallpa PT, DPT       Note: If patient does not return for scheduled/ recommended follow up visits, this note will serve as a discharge from care along with most recent update on progress.

## 2021-09-29 NOTE — PROGRESS NOTES
Here for eval of some UTI sx, sx seem to occur after sexual activity. Pt does try and urinate after sexual activity. Pt does feel better with some mild persistent nocturia. Does not have any dysuria. Pt has some pressure as well. Those sx have improved    Pt has noted a rash on thighs, sx are on and off but on thighs bilaterally. Pt wanted to have evaluated. Pt wondered if related to her laundry detergent. Pt does tend to sweat. Except as noted above in the history of present illness, the review of systems is  negative for headache, vision changes, chest pain, shortness of breath, abdominal pain, urinary sx, bowel changes. Past medical, surgical, and social history reviewed and updated  Medications and allergies reviewed and updated        O: /80   Pulse 88   Temp 97.8 °F (36.6 °C) (Temporal)   Resp 18   Wt 181 lb 6.4 oz (82.3 kg)   SpO2 96%   BMI 34.28 kg/m²   GEN: No acute distress, cooperative, well nourished, alert. HEENT: PEERLA, EOMI , normocephalic/atraumatic, nares and oropharynx clear. Mucous membranes normal, Tympanic membranes clear bilaterally. Neck: soft, supple, no thyromegaly, mass, no Lymphadenopathy  CV: Regular rate and rhythm, no murmur, rubs, gallops. No edema. Resp: Clear to auscultation bilaterally good air entry bilaterally  No crackles, wheeze. Breathing comfortably. Psych: mood stable, No suicidal thoughts or ideation   Skin: minimal follicular irritation to thighs bilaterally        Current Outpatient Medications   Medication Sig Dispense Refill    methylphenidate (CONCERTA) 36 MG extended release tablet Take 1 tablet by mouth every morning for 30 days. 30 tablet 0    [START ON 9/30/2021] methylphenidate (CONCERTA) 36 MG extended release tablet Take 1 tablet by mouth every morning for 30 days. 30 tablet 0    [START ON 10/30/2021] methylphenidate (CONCERTA) 36 MG extended release tablet Take 1 tablet by mouth every morning for 30 days.  30 tablet 0    desvenlafaxine succinate (PRISTIQ) 50 MG TB24 extended release tablet Take 1 tablet by mouth daily 30 tablet 5    pantoprazole (PROTONIX) 40 MG tablet Take 1 tablet by mouth every morning (before breakfast) 30 tablet 5    albuterol sulfate HFA (PROVENTIL HFA) 108 (90 Base) MCG/ACT inhaler Inhale 2 puffs into the lungs every 6 hours as needed for Wheezing 1 Inhaler 3    hydrOXYzine (ATARAX) 25 MG tablet Take 1 tablet by mouth every 8 hours as needed for Anxiety (Patient not taking: Reported on 9/29/2021) 30 tablet 5     No current facility-administered medications for this visit. Results for POC orders placed in visit on 09/29/21   POCT Urinalysis no Micro   Result Value Ref Range    Color, UA yellow     Clarity, UA clear     Glucose, UA POC negative     Bilirubin, UA negative     Ketones, UA negative     Spec Grav, UA 1.020     Blood, UA POC trace     pH, UA 6.0     Protein, UA POC negative     Urobilinogen, UA 0.2     Leukocytes, UA negative     Nitrite, UA negative           ASSESSMENT / PLAN:    1. UTI symptoms  UA negative, denies any current sx  Check cx  - POCT Urinalysis no Micro    2. Skin rash  Minimal irritation, ? Mild folliculitis  Local care discussed. 3. Recurrent UTI  Suspect related to UTI sx  rx given for cipro for prn usage as directed  Await urine cx  - Culture, Urine    4. Hyperglycemia  a1c normal    5. Hiatal hernia  Working to get EGD set up  F/u with dr Santos    6. Screen for colon cancer  As above  F/u with dr. Santos           Follow-up appointment:   Pending urine cx    Discussed use, benefit, and side effects of all prescribed medications. Barriers to medication compliance addressed. All patient questions answered. Pt voiced understanding. When applicable, patient's outside records were reviewed through [facility]. The patient has signed appropriate paperworks/consents.

## 2021-11-05 ENCOUNTER — OFFICE VISIT (OUTPATIENT)
Dept: FAMILY MEDICINE CLINIC | Age: 56
End: 2021-11-05
Payer: COMMERCIAL

## 2021-11-05 VITALS
HEIGHT: 61 IN | OXYGEN SATURATION: 96 % | HEART RATE: 64 BPM | RESPIRATION RATE: 12 BRPM | TEMPERATURE: 97.1 F | BODY MASS INDEX: 34.02 KG/M2 | DIASTOLIC BLOOD PRESSURE: 84 MMHG | SYSTOLIC BLOOD PRESSURE: 132 MMHG | WEIGHT: 180.2 LBS

## 2021-11-05 DIAGNOSIS — Z23 NEED FOR SHINGLES VACCINE: ICD-10-CM

## 2021-11-05 DIAGNOSIS — R73.9 HYPERGLYCEMIA: ICD-10-CM

## 2021-11-05 DIAGNOSIS — R10.84 GENERALIZED ABDOMINAL PAIN: ICD-10-CM

## 2021-11-05 DIAGNOSIS — Z00.00 ROUTINE GENERAL MEDICAL EXAMINATION AT A HEALTH CARE FACILITY: Primary | ICD-10-CM

## 2021-11-05 DIAGNOSIS — F90.0 ATTENTION DEFICIT HYPERACTIVITY DISORDER (ADHD), PREDOMINANTLY INATTENTIVE TYPE: ICD-10-CM

## 2021-11-05 DIAGNOSIS — F41.8 DEPRESSION WITH ANXIETY: ICD-10-CM

## 2021-11-05 DIAGNOSIS — K44.9 HIATAL HERNIA: ICD-10-CM

## 2021-11-05 PROCEDURE — 90750 HZV VACC RECOMBINANT IM: CPT | Performed by: FAMILY MEDICINE

## 2021-11-05 PROCEDURE — 99396 PREV VISIT EST AGE 40-64: CPT | Performed by: FAMILY MEDICINE

## 2021-11-05 PROCEDURE — 90471 IMMUNIZATION ADMIN: CPT | Performed by: FAMILY MEDICINE

## 2021-11-05 PROCEDURE — G8484 FLU IMMUNIZE NO ADMIN: HCPCS | Performed by: FAMILY MEDICINE

## 2021-11-05 RX ORDER — METHYLPHENIDATE HYDROCHLORIDE 36 MG/1
36 TABLET ORAL EVERY MORNING
Qty: 30 TABLET | Refills: 0 | Status: SHIPPED | OUTPATIENT
Start: 2022-01-04 | End: 2022-03-21 | Stop reason: SDUPTHER

## 2021-11-05 RX ORDER — METHYLPHENIDATE HYDROCHLORIDE 36 MG/1
36 TABLET ORAL EVERY MORNING
Qty: 30 TABLET | Refills: 0 | Status: SHIPPED | OUTPATIENT
Start: 2021-12-05 | End: 2022-03-21 | Stop reason: SDUPTHER

## 2021-11-05 RX ORDER — ALBUTEROL SULFATE 90 UG/1
2 AEROSOL, METERED RESPIRATORY (INHALATION) EVERY 6 HOURS PRN
Qty: 1 EACH | Refills: 5 | Status: SHIPPED | OUTPATIENT
Start: 2021-11-05

## 2021-11-05 RX ORDER — METHYLPHENIDATE HYDROCHLORIDE 36 MG/1
36 TABLET ORAL EVERY MORNING
Qty: 30 TABLET | Refills: 0 | Status: SHIPPED | OUTPATIENT
Start: 2021-11-05 | End: 2022-03-21 | Stop reason: SDUPTHER

## 2021-11-05 NOTE — PROGRESS NOTES
Here for well checkup physical.  Pt states that overall has been doing well, staying healthy. Pt did just have colonoscopy done a few days ago that looked ok and was set for have f/u in 7 years. Pt did have EGD as well and did have bx for possible dent's. Pt did have treatment with PPI and sx are improved overall    Pt states that she is feeling well overall, managing the stress of COVID ok. Pt states that she has noted that with vaccines, she is getting lymph node swelling in axilla that goes away after a few days. Did get with COVID and flu shot, as well as shingles. Sx go away quickly. Pt is UTD on vaccines    Pt does not stay consistent with lifestyle/exercise. Pt does go in fits and starts. Does is decent but could be better. Pt does struggle with consistency of eating at work, eats more later in the day. Here for follow up of ADD. Pt states that they are doing very well with current therapy and feels that control of symptoms are adequate at this time. No breakthru symptoms and no need/indication for adjustment in therapy. Taking meds as prescribed and denies any illicit medication usage of misuse of their stimulant thearpy. Mood is stable and denies any issues of depression or anxiety associated with treatment of ADD. Except as noted in the history of present illness as above, the review of systems is negative for the following:    General ROS: negative  Psychological ROS: negative  Allergy and Immunology ROS: negative  Hematological and Lymphatic ROS: negative  Respiratory ROS: no cough, shortness of breath, or wheezing  Cardiovascular ROS: no chest pain or dyspnea on exertion  Gastrointestinal ROS: no abdominal pain, change in bowel habits, or black or bloody stools  Genito-Urinary ROS: no dysuria, trouble voiding, or hematuria  Musculoskeletal ROS: negative  Dermatological ROS: negative      Past medical, surgical, and social history reviewed and updated.    Medications and allergies reviewed and updated      /84   Pulse 64   Temp 97.1 °F (36.2 °C) (Temporal)   Resp 12   Ht 5' 0.6\" (1.539 m)   Wt 180 lb 3.2 oz (81.7 kg)   SpO2 96%   BMI 34.50 kg/m²   General appearance - healthy, alert, no distress  Skin - Skin color, texture, turgor normal. No rashes or lesions. No suspicious findings  Head - Normocephalic. No masses, lesions, tenderness or abnormalities  Eyes - conjunctivae/corneas clear. Pupils equal and reactive to light and accomodation, extraocular muscles intact. Ears - External ears normal. Canals clear. Tympanic membranes normal bilaterally. Nose/Sinuses - Nares normal. Septum midline. Mucosa normal. No drainage or sinus tenderness. Oropharynx - Lips, mucosa, and tongue normal. Teeth and gums normal.   Neck - Neck supple. No adenopathy. Thyroid symmetric, normal size, no carotid bruit bilaterally  Back - Back symmetric, no curvature. Range of motion normal. No Costovertebral angle tenderness. Lungs - Percussion normal. Good diaphragmatic excursion. Lungs clear without wheeze, rales, crackles  Heart - Regular rate and rhythm, with no rub, murmur or gallop noted. Abdomen - Abdomen soft, non-tender. Bowel sounds normal. No masses, tenderness or organomegaly  Extremities - Extremities normal. No deformities, edema, or skin discoloration  Musculoskeletal - Spine ROM normal. Muscular strength intact. Peripheral pulses - radial=4/4,, femoral=4/4, popliteal=4/4, dorsalis pedis=4/4,  Neuro - Gait normal. Reflexes normal and symmetric. Sensation grossly normal.  No focal weakness  Psych - euthymic, no suicidal thoughts or ideation, mood stable.       Office Visit on 09/29/2021   Component Date Value Ref Range Status    Color, UA 09/29/2021 yellow   Final    Clarity, UA 09/29/2021 clear   Final    Glucose, UA POC 09/29/2021 negative   Final    Bilirubin, UA 09/29/2021 negative   Final    Ketones, UA 09/29/2021 negative   Final    Spec Grav, UA 09/29/2021 1.020 Final    Blood, UA POC 09/29/2021 trace   Final    pH, UA 09/29/2021 6.0   Final    Protein, UA POC 09/29/2021 negative   Final    Urobilinogen, UA 09/29/2021 0.2   Final    Leukocytes, UA 09/29/2021 negative   Final    Nitrite, UA 09/29/2021 negative   Final      Lab Results   Component Value Date    CREATININE 0.8 07/27/2021    BUN 14 07/27/2021     07/27/2021    K 3.6 07/27/2021     07/27/2021    CO2 24 07/27/2021       Lab Results   Component Value Date    LABA1C 5.5 07/27/2021     Lab Results   Component Value Date    .2 07/27/2021         ASSESSMENT / PLAN:    1. Routine general medical examination at a health care facility  No focal abnormalities on exam  Anticipatory guidance discussed. Reviewed recent bloodwork as above    2. Attention deficit hyperactivity disorder (ADHD), predominantly inattentive type  Stable with concerta  refills given for 3 months  Pt aware of need for every 3 month medication followup appointments, and that medication refills for benzodiazepines, narcotics and/or stimulants will only be given at appointment. - methylphenidate (CONCERTA) 36 MG extended release tablet; Take 1 tablet by mouth every morning for 30 days. Dispense: 30 tablet; Refill: 0  - methylphenidate (CONCERTA) 36 MG extended release tablet; Take 1 tablet by mouth every morning for 30 days. Dispense: 30 tablet; Refill: 0  - methylphenidate (CONCERTA) 36 MG extended release tablet; Take 1 tablet by mouth every morning for 30 days. Dispense: 30 tablet; Refill: 0    3. Hiatal hernia  Reviewed EGD  Minimal sx  Cont PPI therapy     4. Hyperglycemia  a1c normal    5. Generalized abdominal pain  Chronic w/o abnormal findings  EGD/colonoscopy normal    6. Depression with anxiety  Stable w/o flare  Cont supportive therapy    7. Need for shingles vaccine  Given # 2 today           Follow-up appointment:   3 months/prn    Discussed use, benefit, and side effects of all prescribed medications. Barriers to medication compliance addressed. All patient questions answered. Pt voiced understanding. When applicable, patient's outside records were reviewed through [facility]. The patient has signed appropriate paperworks/consents.

## 2021-11-16 ENCOUNTER — TELEPHONE (OUTPATIENT)
Dept: FAMILY MEDICINE CLINIC | Age: 56
End: 2021-11-16

## 2021-11-16 NOTE — TELEPHONE ENCOUNTER
----- Message from Madeline Maddox sent at 11/15/2021  4:30 PM EST -----  Subject: Message to Provider    QUESTIONS  Information for Provider? Patient would like to check if her new insurance   will be accepted patient will be switching to [employer] at   the first of the year.   ---------------------------------------------------------------------------  --------------  [address]  What is the best way for the office to contact you? OK to leave message on   voicemail  Preferred Call Back Phone Number? [phone]  ---------------------------------------------------------------------------  --------------  SCRIPT ANSWERS  Relationship to Patient?  Self

## 2021-11-17 ENCOUNTER — OFFICE VISIT (OUTPATIENT)
Dept: ORTHOPEDIC SURGERY | Age: 56
End: 2021-11-17
Payer: COMMERCIAL

## 2021-11-17 VITALS — HEIGHT: 61 IN | BODY MASS INDEX: 33.99 KG/M2 | WEIGHT: 180 LBS

## 2021-11-17 DIAGNOSIS — Z98.890 STATUS POST HIP SURGERY: Primary | ICD-10-CM

## 2021-11-17 PROCEDURE — 99213 OFFICE O/P EST LOW 20 MIN: CPT | Performed by: ORTHOPAEDIC SURGERY

## 2021-11-17 PROCEDURE — G8417 CALC BMI ABV UP PARAM F/U: HCPCS | Performed by: ORTHOPAEDIC SURGERY

## 2021-11-17 PROCEDURE — 3017F COLORECTAL CA SCREEN DOC REV: CPT | Performed by: ORTHOPAEDIC SURGERY

## 2021-11-17 PROCEDURE — G8427 DOCREV CUR MEDS BY ELIG CLIN: HCPCS | Performed by: ORTHOPAEDIC SURGERY

## 2021-11-17 PROCEDURE — G8484 FLU IMMUNIZE NO ADMIN: HCPCS | Performed by: ORTHOPAEDIC SURGERY

## 2021-11-17 PROCEDURE — 1036F TOBACCO NON-USER: CPT | Performed by: ORTHOPAEDIC SURGERY

## 2021-11-17 NOTE — LETTER
[address]  [address] [facility] [address]  Phone: [phone]  Fax: [phone]    Kevin Garcia MD        November 17, 2021     Patient: Roger Morales   YOB: 1965   Date of Visit: 11/17/2021       To Whom It May Concern: It is my medical opinion that Susann Eisenmenger may return to work with the following restrictions: no excessive bending, pulling or stretching as related to patient direct care for more than 10 hours per week. Weight limited to 40lbs at a time. Restrictions in place for 3 more months. .    If you have any questions or concerns, please don't hesitate to call.     Sincerely,          Kevin Garcia MD

## 2021-11-17 NOTE — LETTER
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 Pushing and pulling limited to 40lbs at a time. Restrictions in place for 3 more months. If you have any questions or concerns, please don't hesitate to call.     Sincerely,          Robson Hamilton MD

## 2021-11-17 NOTE — LETTER
[address]  [address] [facility] [address]  Phone: [phone]  Fax: [phone]    Mavis Blank MD        November 17, 2021     Patient: Amanda Tate   YOB: 1965   Date of Visit: 11/17/2021       To Whom It May Concern: It is my medical opinion that Amee Torres may return to work with the following restrictions: patient not to exceed pushing or pulling patients for more than 10 hours per week. Pushing and pulling limited to 40lbs at time. Restrictions in place for 3 more months. If you have any questions or concerns, please don't hesitate to call.     Sincerely,          Mavis Blank MD

## 2021-11-17 NOTE — PROGRESS NOTES
Chief Complaint  Hip Pain (5.5 MONTH POST OP RIGHT HIP ARTHROSCOPY 05/28/2021)      History of Present Illness:  Hilda Ledezma is a pleasant 63 y.o. female  who is 6 months post right hip arthroscopy, acetabuloplasty and labral repair. She is doing quite well. She still has the occasional anterior lateral hip pinching discomfort depending on certain movements. She still working as a nursing aide. She is keen on maintaining current restrictions are she is worried about causing further damage. No setbacks. Has occasional discomfort the last for 1 or 2 hours at the front of her hip , but otherwise satisfied. Medical History:  Patient's medications, allergies, past medical, surgical, social and family histories were reviewed and updated as appropriate. Pain Assessment  Location of Pain:  (HIP)  Location Modifiers: Right  Severity of Pain: 0  Frequency of Pain: Rarely  Limiting Behavior: Some  Relieving Factors: Rest  Result of Injury: No  Work-Related Injury: No  Are there other pain locations you wish to document?: No  ROS: Review of systems reviewed from Patient History Form completed today and available in the patient's chart under the Media tab. Pertinent items are noted in HPI  Review of systems reviewed from Patient History Form completed today and available in the patient's chart under the Media tab. Vital Signs:  Ht 5' 0.72\" (1.542 m)   Wt 180 lb (81.6 kg)   BMI 34.33 kg/m²         Neuro: Alert & oriented x 3,  normal,  no focal deficits noted. Normal affect. Eyes: sclera clear  Ears: Normal external ear  Mouth:  No perioral lesions  Pulm: Respirations unlabored and regular  Pulse: Extremities well perfused. 2+ peripheral pulses. Skin: Warm. No ulcerations. Constitutional: The physical examination finds the patient to be well-developed and well-nourished. The patient is alert and oriented x3 and was cooperative throughout the visit.       Hip exam    Hip Examination: RIGHT    Skin/Inspection: no skin lesions, cellulitis, or extreme edema in the lower extremities. Standing/Walking: Normal non-antalgic gait, no pelvic tilt,  -ve Trendelenburg sign. No use of assistive devices    Sitting Exam: 5/5 Hip Flexor Strength, 5/5 Abductor Strength, 5/5 Adductor strength, Negative Straight Leg Raise    Supine Exam: Non tender around the ASIS, AIIS  Flexion arc 0 to 100deg, with no  pain  Internal Rotation 25deg   External Rotation 45deg  Special Tests: -ve Deep Flexion Test, mild FADIR , mild CIARA    Side Lying Exam: -ve tender at greater trochanter, non tender at abductor musculature, non  tender along the TFL, non  tender along short external rotators/piriformis. Abductor side leg raise 5/5 strength. -veOberTest    Special Tests: Normal squats lunges single-leg bridges with no major discomfort    Distal Neurovascular exam is intact (foot sensation, pulses, and motor exam)    Diagnostics:  Radiology:     No new imaging today. Assessment: Patient is a 63 y.o. female 6 months post right hip arthroscopy, acetabular plasty, labral repair. She is progressing appropriately for 6 months. Impression:  Visit Diagnoses       Codes    Status post hip surgery    -  Primary W03.693          Office Procedures:  No orders of the defined types were placed in this encounter. No orders of the defined types were placed in this encounter. Plan:  My recommendation for Ana Maldonado is to continue with her home exercise maintenance program.  I am okay with her continuing some current work restriction including no repetitive pulling pushing more than 40 pounds and to limit strenuous pulling pushing nursing 8 activity to less than 10 hours a week. I would like to see her back in 3 months for reassessment. All the patient's questions were answered while in the clinic. The patient is understanding of all instructions and agrees with the plan.     Approximately 30 minutes was spent on patient education and coordinating care. Follow up in: No follow-ups on file. Sincerely,    Robson Hamilton MD [facility]   [address] [place], [facility]  Email: [email or web address]  Office: [phone]    11/17/21  4:53 PM      The encounter with Jose Manuel Chin was carried out by myself, Dr Gertrude Ventura, who personally examined the patient and reviewed the plan. This dictation was performed with a verbal recognition program (DRAGON) and it was checked for errors. It is possible that there are still dictated errors within this office note. If so, please bring any errors to my attention for an addendum. All efforts were made to ensure that this office note is accurate.

## 2022-02-16 ENCOUNTER — OFFICE VISIT (OUTPATIENT)
Dept: ORTHOPEDIC SURGERY | Age: 57
End: 2022-02-16
Payer: COMMERCIAL

## 2022-02-16 VITALS — HEIGHT: 60 IN | WEIGHT: 180 LBS | BODY MASS INDEX: 35.34 KG/M2

## 2022-02-16 DIAGNOSIS — Z98.890 STATUS POST HIP SURGERY: Primary | ICD-10-CM

## 2022-02-16 DIAGNOSIS — M70.62 GREATER TROCHANTERIC BURSITIS OF LEFT HIP: ICD-10-CM

## 2022-02-16 PROCEDURE — G8417 CALC BMI ABV UP PARAM F/U: HCPCS | Performed by: ORTHOPAEDIC SURGERY

## 2022-02-16 PROCEDURE — G8427 DOCREV CUR MEDS BY ELIG CLIN: HCPCS | Performed by: ORTHOPAEDIC SURGERY

## 2022-02-16 PROCEDURE — G8484 FLU IMMUNIZE NO ADMIN: HCPCS | Performed by: ORTHOPAEDIC SURGERY

## 2022-02-16 PROCEDURE — 1036F TOBACCO NON-USER: CPT | Performed by: ORTHOPAEDIC SURGERY

## 2022-02-16 PROCEDURE — 3017F COLORECTAL CA SCREEN DOC REV: CPT | Performed by: ORTHOPAEDIC SURGERY

## 2022-02-16 PROCEDURE — 99214 OFFICE O/P EST MOD 30 MIN: CPT | Performed by: ORTHOPAEDIC SURGERY

## 2022-02-16 RX ORDER — NAPROXEN 500 MG/1
500 TABLET ORAL 2 TIMES DAILY WITH MEALS
Qty: 60 TABLET | Refills: 1 | Status: SHIPPED | OUTPATIENT
Start: 2022-02-16

## 2022-02-16 RX ORDER — NAPROXEN 500 MG/1
500 TABLET ORAL 2 TIMES DAILY WITH MEALS
Qty: 60 TABLET | Refills: 5 | Status: SHIPPED | OUTPATIENT
Start: 2022-02-16 | End: 2022-02-16 | Stop reason: ALTCHOICE

## 2022-02-16 RX ORDER — HYDROCODONE BITARTRATE AND ACETAMINOPHEN 5; 325 MG/1; MG/1
1 TABLET ORAL EVERY 6 HOURS PRN
Qty: 8 TABLET | Refills: 0 | Status: SHIPPED | OUTPATIENT
Start: 2022-02-16 | End: 2022-07-18 | Stop reason: SDUPTHER

## 2022-02-16 NOTE — LETTER
[address]  [address] [facility] [address]  Phone: [phone]  Fax: [phone]    Nikolay Amaya MD    February 16, 2022     Kevyn Nevarez MD  [address] [place] [id number]    Patient: Rosalio Holland   MR Number: [id number]   YOB: 1965   Date of Visit: 2/16/2022       Dear Kevyn eNvarez: Thank you for referring Hodan Helms to me for evaluation/treatment. Below are the relevant portions of my assessment and plan of care. If you have questions, please do not hesitate to call me. I look forward to following ALYSON along with you.     Sincerely,      Nikolay Amaya MD

## 2022-02-16 NOTE — LETTER
[address]  [address] [facility] [address]  Phone: [phone]  Fax: [phone]    Bryan Matias MD        February 16, 2022     Patient: Jone Scott   YOB: 1965   Date of Visit: 2/16/2022       To Whom It May Concern: It is my medical opinion that Genaro Cool may return to work immediately with no restrictions. If you have any questions or concerns, please don't hesitate to call.     Sincerely,          Bryan Matias MD

## 2022-02-16 NOTE — PROGRESS NOTES
Chief Complaint  Post-Op Check (9 MONTH POST OP RIGHT HIP SCOPE 5/28/2021)      History of Present Illness:  Angélica Samano is a pleasant 61 y.o. female who is an existing patient of mine who is 9 months post right hip arthroscopy, pinceroplasty, labral repair. Her right hip is doing terrific. She has been back to work and has in fact been promoted. However in the past 1 to 2 months has been noticing some progressive contralateral left hip pain with no inciting incident. The pain is mostly lateral.  There is no groin groin pain on the left side as she did with the right. No numbness or tingling. Responds to rest and activity modification. Sometimes bothers her to sleep on that side. No mechanical symptoms. Previous x-rays that she is just bilateral pincer FORTINO    Medical History:  Patient's medications, allergies, past medical, surgical, social and family histories were reviewed and updated as appropriate. Pain Assessment  Location of Pain:  (HIP)  Location Modifiers: Right  Severity of Pain: 1  Quality of Pain: Aching  Frequency of Pain: Rarely  Aggravating Factors:  (OVER EXERSION. BENDING, SQUATTING, LIFTING, WALKING.)  Limiting Behavior: No  Result of Injury: No  Work-Related Injury: No  Are there other pain locations you wish to document?: No  ROS: Review of systems reviewed from Patient History Form completed today and available in the patient's chart under the Media tab. Pertinent items are noted in HPI  Review of systems reviewed from Patient History Form completed today and available in the patient's chart under the Media tab. Vital Signs:  Ht 5' (1.524 m)   Wt 180 lb (81.6 kg)   BMI 35.15 kg/m²         Neuro: Alert & oriented x 3,  normal,  no focal deficits noted. Normal affect. Eyes: sclera clear  Ears: Normal external ear  Mouth:  No perioral lesions  Pulm: Respirations unlabored and regular  Pulse: Extremities well perfused. 2+ peripheral pulses. Skin: Warm.  No ulcerations. Constitutional: The physical examination finds the patient to be well-developed and well-nourished. The patient is alert and oriented x3 and was cooperative throughout the visit. Hip Examination: left    Skin/Inspection:   No skin lesions, cellulitis, or extreme edema in the lower extremities. Standing/Walking: normal gait, negative Trendelenburg sign. Supine Exam: Non tender around the ASIS, AIIS  Flexion arc 0 to 100deg, IR 45 deg, ER 60 deg full range with pain  Special Tests:  negative Deep Flexion Test, negative  FADIR ,  positive  pain with CIARA that is posterior and left lateral   Resisted Abduction 55/5   Resisted Adduction 55/5   Resisted Hip Flexion 55/5    Side Lying Exam: tender at greater trochanter, tender abductor musculature,   Abductor side leg raise 5/5, tight. OberTest    Special Tests:  Double Leg Squats (Standing): good  Lunges: good     Distal Neurovascular exam is intact (foot sensation, pulses, and motor exam)        Diagnostics:    No new imaging was obtained today        Assessment: Patient is a 61 y.o. female new onset lateral sided hip pain likely consistent with greater trochanteric pain syndrome. Her right hip is 9 months post hip arthroscopy labral repair and this is still doing terrific. Impression:  Visit Diagnoses       Codes    Status post hip surgery    -  Primary Z98.890    Greater trochanteric bursitis of left hip     M70.62          Office Procedures:  Orders Placed This Encounter   Medications    DISCONTD: naproxen (NAPROSYN) 500 MG tablet     Sig: Take 1 tablet by mouth 2 times daily (with meals)     Dispense:  60 tablet     Refill:  5    HYDROcodone-acetaminophen (NORCO) 5-325 MG per tablet     Sig: Take 1 tablet by mouth every 6 hours as needed for Pain for up to 5 days.      Dispense:  8 tablet     Refill:  0     Reduce doses taken as pain becomes manageable    naproxen (NAPROSYN) 500 MG tablet     Sig: Take 1 tablet by mouth 2 times daily (with meals)     Dispense:  60 tablet     Refill:  1     No orders of the defined types were placed in this encounter. Plan:  She is doing great. We will keep her left hip under surveillance. She is a candidate for short course of Norco prescription and repeat naproxen prescription. I recommended resuming some of her home exercises for abductor conditioning IT band flexibility. Should her left hip pain fail to resolve we can offer a cortisone injection into the bursa in my office as the next step. I would see her back in 3 months for reassessment. At that point we can get new x-rays of both hips including AP pelvis and bilateral Patterson views. All the patient's questions were answered while in the clinic. The patient is understanding of all instructions and agrees with the plan. Approximately 30 minutes was spent on patient education and coordinating care. Follow up in: Return in about 3 months (around 5/16/2022). +x-rays of both hips including AP pelvis and bilateral Patterson views. And new HOS/IHOT/VAS scales filled out      Sincerely,    Yuko Rossi  [address] [facility] [address] [place] [id number]  Email: [email or web address]  Office: [phone]    02/16/22  12:36 PM        The encounter with Ramakrishna Herr was carried out by myself, Dr Ellie Vidal, who personally examined the patient and reviewed the plan. This dictation was performed with a verbal recognition program (DRAGON) and it was checked for errors. It is possible that there are still dictated errors within this office note. If so, please bring any errors to my attention for an addendum. All efforts were made to ensure that this office note is accurate.

## 2022-03-21 ENCOUNTER — HOSPITAL ENCOUNTER (OUTPATIENT)
Dept: GENERAL RADIOLOGY | Age: 57
Discharge: HOME | End: 2022-03-21
Payer: COMMERCIAL

## 2022-03-21 ENCOUNTER — OFFICE VISIT (OUTPATIENT)
Dept: FAMILY MEDICINE CLINIC | Age: 57
End: 2022-03-21
Payer: COMMERCIAL

## 2022-03-21 ENCOUNTER — HOSPITAL ENCOUNTER (OUTPATIENT)
Age: 57
Discharge: HOME | End: 2022-03-21
Payer: COMMERCIAL

## 2022-03-21 VITALS
DIASTOLIC BLOOD PRESSURE: 80 MMHG | OXYGEN SATURATION: 96 % | SYSTOLIC BLOOD PRESSURE: 138 MMHG | WEIGHT: 179.4 LBS | HEART RATE: 76 BPM | TEMPERATURE: 96.3 F | BODY MASS INDEX: 35.04 KG/M2

## 2022-03-21 DIAGNOSIS — K57.90 DIVERTICULOSIS: ICD-10-CM

## 2022-03-21 DIAGNOSIS — R73.9 HYPERGLYCEMIA: ICD-10-CM

## 2022-03-21 DIAGNOSIS — K21.00 GASTROESOPHAGEAL REFLUX DISEASE WITH ESOPHAGITIS WITHOUT HEMORRHAGE: ICD-10-CM

## 2022-03-21 DIAGNOSIS — E78.00 HYPERCHOLESTEROLEMIA: ICD-10-CM

## 2022-03-21 DIAGNOSIS — M79.641 RIGHT HAND PAIN: ICD-10-CM

## 2022-03-21 DIAGNOSIS — F41.8 DEPRESSION WITH ANXIETY: ICD-10-CM

## 2022-03-21 DIAGNOSIS — R79.89 ELEVATED TSH: ICD-10-CM

## 2022-03-21 DIAGNOSIS — F90.0 ATTENTION DEFICIT HYPERACTIVITY DISORDER (ADHD), PREDOMINANTLY INATTENTIVE TYPE: ICD-10-CM

## 2022-03-21 DIAGNOSIS — F33.0 MAJOR DEPRESSIVE DISORDER, RECURRENT EPISODE, MILD (HCC): Primary | ICD-10-CM

## 2022-03-21 LAB
A/G RATIO: 2 (ref 1.1–2.2)
ALBUMIN SERPL-MCNC: 4.9 G/DL (ref 3.4–5)
ALP BLD-CCNC: 94 U/L (ref 40–129)
ALT SERPL-CCNC: 17 U/L (ref 10–40)
ANION GAP SERPL CALCULATED.3IONS-SCNC: 16 MMOL/L (ref 3–16)
AST SERPL-CCNC: 19 U/L (ref 15–37)
BILIRUB SERPL-MCNC: 0.5 MG/DL (ref 0–1)
BUN BLDV-MCNC: 14 MG/DL (ref 7–20)
CALCIUM SERPL-MCNC: 9.3 MG/DL (ref 8.3–10.6)
CHLORIDE BLD-SCNC: 103 MMOL/L (ref 99–110)
CHOLESTEROL, TOTAL: 210 MG/DL (ref 0–199)
CO2: 23 MMOL/L (ref 21–32)
CREAT SERPL-MCNC: 0.7 MG/DL (ref 0.6–1.1)
GFR AFRICAN AMERICAN: >60
GFR NON-AFRICAN AMERICAN: >60
GLUCOSE BLD-MCNC: 99 MG/DL (ref 70–99)
HCT VFR BLD CALC: 41.1 % (ref 36–48)
HDLC SERPL-MCNC: 65 MG/DL (ref 40–60)
HEMOGLOBIN: 13.7 G/DL (ref 12–16)
LDL CHOLESTEROL CALCULATED: 129 MG/DL
MCH RBC QN AUTO: 29.4 PG (ref 26–34)
MCHC RBC AUTO-ENTMCNC: 33.2 G/DL (ref 31–36)
MCV RBC AUTO: 88.6 FL (ref 80–100)
PDW BLD-RTO: 13.7 % (ref 12.4–15.4)
PLATELET # BLD: 268 K/UL (ref 135–450)
PMV BLD AUTO: 8.8 FL (ref 5–10.5)
POTASSIUM SERPL-SCNC: 4.4 MMOL/L (ref 3.5–5.1)
RBC # BLD: 4.64 M/UL (ref 4–5.2)
SODIUM BLD-SCNC: 142 MMOL/L (ref 136–145)
T4 FREE: 1 NG/DL (ref 0.9–1.8)
TOTAL PROTEIN: 7.4 G/DL (ref 6.4–8.2)
TRIGL SERPL-MCNC: 80 MG/DL (ref 0–150)
TSH SERPL DL<=0.05 MIU/L-ACNC: 2.63 UIU/ML (ref 0.27–4.2)
VLDLC SERPL CALC-MCNC: 16 MG/DL
WBC # BLD: 7.4 K/UL (ref 4–11)

## 2022-03-21 PROCEDURE — 99214 OFFICE O/P EST MOD 30 MIN: CPT | Performed by: FAMILY MEDICINE

## 2022-03-21 PROCEDURE — 73130 X-RAY EXAM OF HAND: CPT

## 2022-03-21 PROCEDURE — 73110 X-RAY EXAM OF WRIST: CPT

## 2022-03-21 RX ORDER — METHYLPHENIDATE HYDROCHLORIDE 36 MG/1
36 TABLET ORAL EVERY MORNING
Qty: 30 TABLET | Refills: 0 | Status: SHIPPED | OUTPATIENT
Start: 2022-03-21 | End: 2022-06-28 | Stop reason: SDUPTHER

## 2022-03-21 RX ORDER — PANTOPRAZOLE SODIUM 40 MG/1
TABLET, DELAYED RELEASE ORAL
Qty: 30 TABLET | Refills: 5 | Status: SHIPPED | OUTPATIENT
Start: 2022-03-21

## 2022-03-21 RX ORDER — METHYLPHENIDATE HYDROCHLORIDE 36 MG/1
36 TABLET ORAL EVERY MORNING
Qty: 30 TABLET | Refills: 0 | Status: SHIPPED | OUTPATIENT
Start: 2022-05-20 | End: 2022-06-28 | Stop reason: SDUPTHER

## 2022-03-21 RX ORDER — METHYLPHENIDATE HYDROCHLORIDE 36 MG/1
36 TABLET ORAL EVERY MORNING
Qty: 30 TABLET | Refills: 0 | Status: SHIPPED | OUTPATIENT
Start: 2022-04-20 | End: 2022-06-28 | Stop reason: SDUPTHER

## 2022-03-21 ASSESSMENT — PATIENT HEALTH QUESTIONNAIRE - PHQ9
SUM OF ALL RESPONSES TO PHQ9 QUESTIONS 1 & 2: 0
SUM OF ALL RESPONSES TO PHQ QUESTIONS 1-9: 1
SUM OF ALL RESPONSES TO PHQ QUESTIONS 1-9: 1
5. POOR APPETITE OR OVEREATING: 0
8. MOVING OR SPEAKING SO SLOWLY THAT OTHER PEOPLE COULD HAVE NOTICED. OR THE OPPOSITE, BEING SO FIGETY OR RESTLESS THAT YOU HAVE BEEN MOVING AROUND A LOT MORE THAN USUAL: 0
7. TROUBLE CONCENTRATING ON THINGS, SUCH AS READING THE NEWSPAPER OR WATCHING TELEVISION: 0
1. LITTLE INTEREST OR PLEASURE IN DOING THINGS: 0
10. IF YOU CHECKED OFF ANY PROBLEMS, HOW DIFFICULT HAVE THESE PROBLEMS MADE IT FOR YOU TO DO YOUR WORK, TAKE CARE OF THINGS AT HOME, OR GET ALONG WITH OTHER PEOPLE: 0
3. TROUBLE FALLING OR STAYING ASLEEP: 1
SUM OF ALL RESPONSES TO PHQ QUESTIONS 1-9: 1
9. THOUGHTS THAT YOU WOULD BE BETTER OFF DEAD, OR OF HURTING YOURSELF: 0
2. FEELING DOWN, DEPRESSED OR HOPELESS: 0
6. FEELING BAD ABOUT YOURSELF - OR THAT YOU ARE A FAILURE OR HAVE LET YOURSELF OR YOUR FAMILY DOWN: 0
SUM OF ALL RESPONSES TO PHQ QUESTIONS 1-9: 1
4. FEELING TIRED OR HAVING LITTLE ENERGY: 0

## 2022-03-21 NOTE — PROGRESS NOTES
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 Pt does have some increase responsibility. They still are short-staffed. Pt feels that mood is doing well and anxiety/depression continues to do well. Pt has noted that she can leave things and forget where she put them. No true memory concerns, remembering peoples names well    Here for follow up of ADD. Pt states that they are doing very well with current therapy and feels that control of symptoms are adequate at this time. No breakthru symptoms and no need/indication for adjustment in therapy. Taking meds as prescribed and denies any illicit medication usage of misuse of their stimulant thearpy. Mood is stable and denies any issues of depression or anxiety associated with treatment of ADD. Pt with some discomfort/lump to R wrist area. Sx are mild but feels that there is something there. range of motion seems ok but with discomfort. If she moves it will cause discomfort. Sx for several months with some irritation. Strength is decreased, can't open a jar. Pt trying motrin/aleve without help. Pt does feel some nerve pain with radiation up arm      Except as noted above in the history of present illness, the review of systems is  negative for headache, vision changes, chest pain, shortness of breath, abdominal pain, urinary sx, bowel changes. Past medical, surgical, and social history reviewed and updated  Medications and allergies reviewed and updated        O: /80   Pulse 76   Temp 96.3 °F (35.7 °C)   Wt 179 lb 6.4 oz (81.4 kg)   SpO2 96%   BMI 35.04 kg/m²   GEN: No acute distress, cooperative, well nourished, alert. HEENT: PEERLA, EOMI , normocephalic/atraumatic, nares and oropharynx clear. Mucous membranes normal, Tympanic membranes clear bilaterally.   Neck: soft, supple, no thyromegaly, mass, no Lymphadenopathy  CV: Regular rate and rhythm, no murmur, rubs, gallops. No edema. Resp: Clear to auscultation bilaterally good air entry bilaterally  No crackles, wheeze. Breathing comfortably. Psych: mood stable, No suicidal thoughts or ideation   Abd: soft, nontender. normoactive bowels sounds. No hepatosplenomegaly  No costovertebral angle tenderness, mass        Current Outpatient Medications   Medication Sig Dispense Refill    pantoprazole (PROTONIX) 40 MG tablet TAKE 1 TABLET BY MOUTH EVERY MORNING BEFORE BREAKFAST 30 tablet 5    [START ON 5/20/2022] methylphenidate (CONCERTA) 36 MG extended release tablet Take 1 tablet by mouth every morning for 30 days. 30 tablet 0    [START ON 4/20/2022] methylphenidate (CONCERTA) 36 MG extended release tablet Take 1 tablet by mouth every morning for 30 days. 30 tablet 0    methylphenidate (CONCERTA) 36 MG extended release tablet Take 1 tablet by mouth every morning for 30 days. 30 tablet 0    naproxen (NAPROSYN) 500 MG tablet Take 1 tablet by mouth 2 times daily (with meals) 60 tablet 1    albuterol sulfate HFA (PROVENTIL HFA) 108 (90 Base) MCG/ACT inhaler Inhale 2 puffs into the lungs every 6 hours as needed for Wheezing 1 each 5    ciprofloxacin (CIPRO) 500 MG tablet Take 1 tablet by mouth daily as needed (sexual activity) 30 tablet 2    desvenlafaxine succinate (PRISTIQ) 50 MG TB24 extended release tablet Take 1 tablet by mouth daily 30 tablet 5    hydrOXYzine (ATARAX) 25 MG tablet Take 1 tablet by mouth every 8 hours as needed for Anxiety 30 tablet 5     No current facility-administered medications for this visit. ASSESSMENT / PLAN:    1. Major depressive disorder, recurrent episode, mild (HCC)  Stable w/o flare of sx  Cont supportive therapy     2.  Attention deficit hyperactivity disorder (ADHD), predominantly inattentive type  Stable with concerta  refills given as below  Pt aware of need for every 3 month medication

## 2022-03-22 ENCOUNTER — TELEPHONE (OUTPATIENT)
Dept: FAMILY MEDICINE CLINIC | Age: 57
End: 2022-03-22

## 2022-03-22 LAB
ESTIMATED AVERAGE GLUCOSE: 119.8 MG/DL
HBA1C MFR BLD: 5.8 %

## 2022-03-22 NOTE — TELEPHONE ENCOUNTER
----- Message from Lindsay Hernandez sent at 3/22/2022  3:05 PM EDT -----  Subject: Message to Provider    QUESTIONS  Information for Provider? PT is trying to get her xray results. Please   contact PT back asap. ALso PT said you can leave results on her voicemail.     ---------------------------------------------------------------------------  --------------  CALL BACK INFO  What is the best way for the office to contact you? OK to leave message on   voicemail  Preferred Call Back Phone Number? [phone]  ---------------------------------------------------------------------------  --------------  SCRIPT ANSWERS  Relationship to Patient?  Self

## 2022-03-22 NOTE — TELEPHONE ENCOUNTER
Information for Provider? PT is trying to get her xray results. Please   contact PT back asap. ALso PT said you can leave results on her voicemail. Per Mychart message:    Good afternoon  Xrays show some osteoarthritis but nothing of concern  Want to try some physical therapy or get over to see ortho? Caitlin Sifuentes MD        Detailed message left on pt voicemail.

## 2022-04-19 ENCOUNTER — OFFICE VISIT (OUTPATIENT)
Dept: FAMILY MEDICINE CLINIC | Age: 57
End: 2022-04-19
Payer: COMMERCIAL

## 2022-04-19 VITALS
OXYGEN SATURATION: 97 % | WEIGHT: 179.2 LBS | TEMPERATURE: 97 F | DIASTOLIC BLOOD PRESSURE: 86 MMHG | RESPIRATION RATE: 18 BRPM | SYSTOLIC BLOOD PRESSURE: 116 MMHG | HEART RATE: 84 BPM | BODY MASS INDEX: 35 KG/M2

## 2022-04-19 DIAGNOSIS — R09.89 CHEST CONGESTION: Primary | ICD-10-CM

## 2022-04-19 DIAGNOSIS — R05.9 COUGH: ICD-10-CM

## 2022-04-19 DIAGNOSIS — B96.89 ACUTE BACTERIAL SINUSITIS: ICD-10-CM

## 2022-04-19 DIAGNOSIS — G89.29 CHRONIC FOOT PAIN, RIGHT: ICD-10-CM

## 2022-04-19 DIAGNOSIS — M79.671 CHRONIC FOOT PAIN, RIGHT: ICD-10-CM

## 2022-04-19 DIAGNOSIS — J01.90 ACUTE BACTERIAL SINUSITIS: ICD-10-CM

## 2022-04-19 DIAGNOSIS — J06.9 ACUTE URI: ICD-10-CM

## 2022-04-19 PROCEDURE — 99214 OFFICE O/P EST MOD 30 MIN: CPT | Performed by: FAMILY MEDICINE

## 2022-04-19 RX ORDER — AMOXICILLIN AND CLAVULANATE POTASSIUM 875; 125 MG/1; MG/1
1 TABLET, FILM COATED ORAL 2 TIMES DAILY
Qty: 20 TABLET | Refills: 0 | Status: SHIPPED | OUTPATIENT
Start: 2022-04-19 | End: 2022-04-29

## 2022-04-19 RX ORDER — PROMETHAZINE HYDROCHLORIDE AND CODEINE PHOSPHATE 6.25; 1 MG/5ML; MG/5ML
5 SYRUP ORAL EVERY 4 HOURS PRN
Qty: 120 ML | Refills: 0 | Status: SHIPPED | OUTPATIENT
Start: 2022-04-19 | End: 2022-04-24

## 2022-04-19 NOTE — PROGRESS NOTES
Here for eval of URI sx, present for about 10d. Pt states sx started with sinus congestion, sinus pressure. Pt was sick for 2-3d and went up to [facility] clinic and they dx with acute URI sx, was given tessalon perles and an inhaler. Pt was told that sx were viral.  At this time, sx have continued to be present and now with chest congestion. Cough is productive of phlegm, no hemoptysis. Pt has not had any fever, chills. No shortness of breath and wheezing. 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 No true shortness of breath. Pt did try the albuterol inhaler that she got and it is a little bit helpful. At this time, using mucinex, tessalon perles. Pt has had minimal exposure at work. Pt was not tested for COVID. C/o chronic R foot pain with known hx of heel spurring. Pt denies any recent trauma or injury      Except as noted above in the history of present illness, the review of systems is  negative for headache, vision changes, chest pain, shortness of breath, abdominal pain, urinary sx, bowel changes. Past medical, surgical, and social history reviewed and updated  Medications and allergies reviewed and updated        O: /86   Pulse 84   Temp 97 °F (36.1 °C) (Temporal)   Resp 18   Wt 179 lb 3.2 oz (81.3 kg)   SpO2 97%   BMI 35.00 kg/m²   GEN: No acute distress, cooperative, well nourished, alert. HEENT: PEERLA, EOMI , normocephalic/atraumatic, nares and oropharynx clear. Mucous membranes normal, Tympanic membranes clear bilaterally. Neck: soft, supple, no thyromegaly, mass, no Lymphadenopathy  CV: Regular rate and rhythm, no murmur, rubs, gallops. No edema. Resp: Clear to auscultation bilaterally good air entry bilaterally  No crackles, wheeze. Breathing comfortably. Psych: mood stable, No suicidal thoughts or ideation         ASSESSMENT / PLAN:    1.  Chest congestion  Persistent sx d/t acute sinusitis  tx with augmentin 875mg BID x 10d  Cont over the counter/symptomatic treatment. Follow up for persistent symptoms in 7 to 10 days or sooner for worsening symptomatology     2. Acute URI  As above    3. Acute bacterial sinusitis  augmentin 875mg BID x 10d  Cont over the counter/symptomatic treatment. Follow up for persistent symptoms in 7 to 10 days or sooner for worsening symptomatology     4. Cough  D/t above  Short course phenergan w/ codeine   - promethazine-codeine (PHENERGAN WITH CODEINE) 6.25-10 MG/5ML syrup; Take 5 mLs by mouth every 4 hours as needed for Cough for up to 5 days. Dispense: 120 mL; Refill: 0    5. Chronic foot pain, right  Check xray foot  Management pending results. - XR FOOT RIGHT (2 VIEWS); Future           Follow-up appointment:   Pending xray  Call or return to clinic prn if these symptoms worsen or fail to improve as anticipated. Discussed use, benefit, and side effects of all prescribed medications. Barriers to medication compliance addressed. All patient questions answered. Pt voiced understanding. When applicable, [facility]. The patient has signed appropriate paperworks/consents.

## 2022-05-18 ENCOUNTER — OFFICE VISIT (OUTPATIENT)
Dept: ORTHOPEDIC SURGERY | Age: 57
End: 2022-05-18
Payer: COMMERCIAL

## 2022-05-18 VITALS — WEIGHT: 175 LBS | RESPIRATION RATE: 12 BRPM | HEIGHT: 61 IN | BODY MASS INDEX: 33.04 KG/M2

## 2022-05-18 DIAGNOSIS — M25.559 HIP PAIN: ICD-10-CM

## 2022-05-18 DIAGNOSIS — Z98.890 STATUS POST HIP SURGERY: Primary | ICD-10-CM

## 2022-05-18 PROCEDURE — 99214 OFFICE O/P EST MOD 30 MIN: CPT | Performed by: ORTHOPAEDIC SURGERY

## 2022-05-31 ENCOUNTER — OFFICE VISIT (OUTPATIENT)
Dept: FAMILY MEDICINE CLINIC | Age: 57
End: 2022-05-31
Payer: COMMERCIAL

## 2022-05-31 VITALS
BODY MASS INDEX: 33.63 KG/M2 | TEMPERATURE: 97.4 F | HEART RATE: 99 BPM | RESPIRATION RATE: 14 BRPM | WEIGHT: 178 LBS | DIASTOLIC BLOOD PRESSURE: 82 MMHG | SYSTOLIC BLOOD PRESSURE: 130 MMHG | OXYGEN SATURATION: 95 %

## 2022-05-31 DIAGNOSIS — R20.0 NUMBNESS OF TOES: ICD-10-CM

## 2022-05-31 DIAGNOSIS — R20.0 NUMBNESS OF FINGERS OF BOTH HANDS: Primary | ICD-10-CM

## 2022-05-31 DIAGNOSIS — R20.0 NUMBNESS OF FINGERS OF BOTH HANDS: ICD-10-CM

## 2022-05-31 LAB
C-REACTIVE PROTEIN: 13.2 MG/L (ref 0–5.1)
PROTEIN PROTEIN: 0.03 G/DL
PROTEIN PROTEIN: 29 MG/DL
VITAMIN B-12: 417 PG/ML (ref 211–911)

## 2022-05-31 PROCEDURE — 99214 OFFICE O/P EST MOD 30 MIN: CPT | Performed by: FAMILY MEDICINE

## 2022-05-31 RX ORDER — GABAPENTIN 100 MG/1
100 CAPSULE ORAL 3 TIMES DAILY
Qty: 90 CAPSULE | Refills: 2 | Status: SHIPPED | OUTPATIENT
Start: 2022-05-31 | End: 2022-09-14

## 2022-05-31 NOTE — PROGRESS NOTES
Subjective:      Patient ID: Tracy Ramírez 61 y.o. female. is here for evaluation for tingling hands and toes      HPI    Several weeks tingling in fingers and toes  Comes and goes. Also has throbbing pain in the hands and feet - not at the same time. 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 Tingling occurs a few times a day; can be hands or feet. Rarely both. Lasts up to a few minutes. Can come on with activity or rest.  The hands sometimes tingle but numbness bothers her the most.   The pain in the hands is sudden sharp pain for a second in the left wrist.  Right hand aches - this is chronic from OA. The left wrist pain is new in the past 2 weeks.  feels a bit weak - trouble opening jars. No neck pain. On and off aching between the shoulder blades. RX: naproxen helps OA but not the tingling. Lab Results   Component Value Date     03/21/2022    K 4.4 03/21/2022    K 3.8 12/06/2019     03/21/2022    CO2 23 03/21/2022    BUN 14 03/21/2022    CREATININE 0.7 03/21/2022    GLUCOSE 99 03/21/2022    CALCIUM 9.3 03/21/2022       Lab Results   Component Value Date    WBC 7.4 03/21/2022    HGB 13.7 03/21/2022    HCT 41.1 03/21/2022    MCV 88.6 03/21/2022     03/21/2022     Lab Results   Component Value Date    TSH 2.63 03/21/2022      Lab Results   Component Value Date    BJRMJWLX17 524 09/08/2020      Lab Results   Component Value Date    LABA1C 5.8 03/21/2022     Lab Results   Component Value Date    .8 03/21/2022        Outpatient Medications Marked as Taking for the 5/31/22 encounter (Office Visit) with Serg Whitman MD   Medication Sig Dispense Refill    pantoprazole (PROTONIX) 40 MG tablet TAKE 1 TABLET BY MOUTH EVERY MORNING BEFORE BREAKFAST 30 tablet 5    methylphenidate (CONCERTA) 36 MG extended release tablet Take 1 tablet by mouth every morning for 30 days.  30 tablet 0    methylphenidate (CONCERTA) 36 MG extended release tablet Take 1 tablet by mouth every morning for 30 days. 30 tablet 0    methylphenidate (CONCERTA) 36 MG extended release tablet Take 1 tablet by mouth every morning for 30 days. 30 tablet 0    naproxen (NAPROSYN) 500 MG tablet Take 1 tablet by mouth 2 times daily (with meals) 60 tablet 1    albuterol sulfate HFA (PROVENTIL HFA) 108 (90 Base) MCG/ACT inhaler Inhale 2 puffs into the lungs every 6 hours as needed for Wheezing 1 each 5    ciprofloxacin (CIPRO) 500 MG tablet Take 1 tablet by mouth daily as needed (sexual activity) 30 tablet 2    desvenlafaxine succinate (PRISTIQ) 50 MG TB24 extended release tablet Take 1 tablet by mouth daily 30 tablet 5    hydrOXYzine (ATARAX) 25 MG tablet Take 1 tablet by mouth every 8 hours as needed for Anxiety 30 tablet 5        Allergies   Allergen Reactions    Fioricet [Butalbital-Apap-Caffeine] Other (See Comments)     Dizziness.        Patient Active Problem List   Diagnosis    Major depressive disorder, recurrent episode, mild (HCC)    Other specified cardiac dysrhythmias    Migraine    Palpitations    Lumbago    Irritable bowel syndrome with constipation    Acquired cyst of kidney    Tension type headache    Pain in thoracic spine    Reflux esophagitis    DDD (degenerative disc disease), lumbar    ADHD (attention deficit hyperactivity disorder)    Cyst of pancreas    Hiatal hernia    Diverticulosis    DDD (degenerative disc disease), cervical    Depression with anxiety    Hyperglycemia       Past Medical History:   Diagnosis Date    Acid reflux     Acquired cyst of kidney     ADHD (attention deficit hyperactivity disorder) 10/1/2013    DDD (degenerative disc disease), cervical 1/26/2021    DDD (degenerative disc disease), lumbar 5/20/2013    Depression with anxiety 3/10/2021    Depressive disorder, not elsewhere classified 12/13/2010    Hiatal hernia 3/7/2016    History of loop recorder placed 2 years ago    Hyperglycemia 2021    Irritable bowel syndrome 2010    Migraine, unspecified, without mention of intractable migraine without mention of status migrainosus 2010    Palpitations 2010    Pancreatitis     Pneumonia childhood    Reflux esophagitis 2010       Past Surgical History:   Procedure Laterality Date    ABLATION OF DYSRHYTHMIC FOCUS      APPENDECTOMY       SECTION      COLONOSCOPY  2018    COLONOSCOPY WITH BIOPSY performed by Rip Ibarra MD at [facility] Right 2021    RIGHT HIP ARTHROSCOPY, SYNOVECTOMY, ACETABULOPLASTY, LABRAL REPAIR, PSOAS LENGTHENING performed by Jessica Frank MD at [facility]      laparoscopic    OVARY REMOVAL      bilateral    KS COLONOSCOPY FLX DX W/COLLJ SPEC WHEN PFRMD N/A 2018    COLONOSCOPY WITH MAC performed by Rip Ibarra MD at [facility] reviewed. No pertinent family history. Social History     Tobacco Use    Smoking status: Never Smoker    Smokeless tobacco: Never Used   Vaping Use    Vaping Use: Unknown   Substance Use Topics    Alcohol use: Yes     Alcohol/week: 1.0 standard drink     Types: 1 Glasses of wine per week     Comment: once a week    Drug use: No            Review of Systems  Review of Systems    Objective:   Physical Exam  Vitals:    22 1208   BP: 130/82   Pulse: 99   Resp: 14   Temp: 97.4 °F (36.3 °C)   TempSrc: Temporal   SpO2: 95%   Weight: 178 lb (80.7 kg)       Physical Exam  NAD  Skin is warm and dry. No rash. The neck is supple and free of adenopathy or masses, the thyroid is normal without enlargement or nodules. Chest is clear, no wheezing or rales. Normal symmetric air entry throughout both lung fields.    Heart regular with normal rate, no murmer or gallop  Exquisitely tender both wrists; no swelling, effusion or deformity   Normal sensation to monofilament fingers and toes. Motor 5/5 shoulders, elbow, , abduction fingers and thumb and abduction fingers bilaterally. DTR 2/4 biceps, triceps, brachioradials. Motor 5/5 hip flexors, flexion and extension knees, dorsiflexion and plantar flexion feet. DTR 2/4 patella and Achilles tendons. Gait is normal.     Assessment:       Diagnosis Orders   1. Numbness of fingers of both hands  Cyclic Citrul Peptide Antibody, IgG    YAYA Screen with Reflex    C-Reactive Protein    Vitamin B12    CBC with Auto Differential    Electrophoresis Protein, Serum    METHYLMALONIC ACID, SERUM    Immunofixation urine random profile    gabapentin (NEURONTIN) 100 MG capsule   2. Numbness of toes  Cyclic Citrul Peptide Antibody, IgG    YAYA Screen with Reflex    C-Reactive Protein    Vitamin B12    CBC with Auto Differential    Electrophoresis Protein, Serum    METHYLMALONIC ACID, SERUM    Immunofixation urine random profile    gabapentin (NEURONTIN) 100 MG capsule          Plan:      Rule out toxic, nutritional, malignant. Consider cervical stenosis but not hyperreflexic.   Consider EMG and neurology referral.

## 2022-06-01 ENCOUNTER — TELEPHONE (OUTPATIENT)
Dept: FAMILY MEDICINE CLINIC | Age: 57
End: 2022-06-01

## 2022-06-01 LAB
ALBUMIN SERPL-MCNC: 3.6 G/DL (ref 3.1–4.9)
ALPHA-1-GLOBULIN: 0.2 G/DL (ref 0.2–0.4)
ALPHA-2-GLOBULIN: 0.8 G/DL (ref 0.4–1.1)
ANTI-NUCLEAR ANTIBODY (ANA): NEGATIVE
BASOPHILS ABSOLUTE: 0.1 K/UL (ref 0–0.2)
BASOPHILS RELATIVE PERCENT: 1.1 %
BETA GLOBULIN: 1.1 G/DL (ref 0.9–1.6)
CYCLIC CITRULLINATED PEPTIDE ANTIBODY IGG: <0.5 U/ML (ref 0–2.9)
EOSINOPHILS ABSOLUTE: 0.1 K/UL (ref 0–0.6)
EOSINOPHILS RELATIVE PERCENT: 1.6 %
GAMMA GLOBULIN: 1.1 G/DL (ref 0.6–1.8)
HCT VFR BLD CALC: 39.5 % (ref 36–48)
HEMOGLOBIN: 13.4 G/DL (ref 12–16)
LYMPHOCYTES ABSOLUTE: 2.5 K/UL (ref 1–5.1)
LYMPHOCYTES RELATIVE PERCENT: 27.7 %
MCH RBC QN AUTO: 29.9 PG (ref 26–34)
MCHC RBC AUTO-ENTMCNC: 33.8 G/DL (ref 31–36)
MCV RBC AUTO: 88.3 FL (ref 80–100)
MONOCYTES ABSOLUTE: 0.6 K/UL (ref 0–1.3)
MONOCYTES RELATIVE PERCENT: 6.6 %
NEUTROPHILS ABSOLUTE: 5.8 K/UL (ref 1.7–7.7)
NEUTROPHILS RELATIVE PERCENT: 63 %
PDW BLD-RTO: 13.6 % (ref 12.4–15.4)
PLATELET # BLD: 237 K/UL (ref 135–450)
PLATELET SLIDE REVIEW: ADEQUATE
PMV BLD AUTO: 9.4 FL (ref 5–10.5)
RBC # BLD: 4.48 M/UL (ref 4–5.2)
SLIDE REVIEW: NORMAL
SPE/IFE INTERPRETATION: NORMAL
TOTAL PROTEIN: 6.8 G/DL (ref 6.4–8.2)
WBC # BLD: 9.1 K/UL (ref 4–11)

## 2022-06-01 NOTE — LETTER
[address]  Phone: [phone]  Fax: [phone]    Ben Carter MD      June 2, 2022     Patient: Norma Li   YOB: 1965   Date of Visit: 05/31/2022       To Whom It May Concern: It is my medical opinion that Gerrie Schaumann may return to work with the following restrictions not to exceed lifting/carrying more than 15 lbs. If you have any questions or concerns, please don't hesitate to call.     Sincerely,        Ben Carter MD

## 2022-06-01 NOTE — TELEPHONE ENCOUNTER
E[facility]  numbness and tingling in her hands    Pt stated that she was seen yesterday by  about the numbness and tingling in her hands. She completed lab work and is waiting for the results. 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 She states when she needs a restriction note stating that she can not lift or push over 15 lbs until this issue gets solved. okay to lvm per pt. Okay to write note?     Thank you

## 2022-06-02 ENCOUNTER — TELEPHONE (OUTPATIENT)
Dept: FAMILY MEDICINE CLINIC | Age: 57
End: 2022-06-02

## 2022-06-02 NOTE — TELEPHONE ENCOUNTER
----- Message from [facility] sent at 6/2/2022 11:54 AM EDT -----  Subject: Message to Provider    QUESTIONS  Information for Provider? urgent - patient calling in regards to test   results and also about paperwork for work. alternate phone # to call   [phone].  ---------------------------------------------------------------------------  --------------  Raimundo [employer] INFO  What is the best way for the office to contact you? OK to leave message on   voicemail  Preferred Call Back Phone Number? [phone]  ---------------------------------------------------------------------------  --------------  SCRIPT ANSWERS  Relationship to Patient?  Self

## 2022-06-02 NOTE — TELEPHONE ENCOUNTER
Letter printed, will have Dr. Nikolay Espinoza sign tomorrow when she is in office. Pt advised she can  tomorrow.

## 2022-06-02 NOTE — TELEPHONE ENCOUNTER
I will edit letter however how long is patient under restrictions? Patient saw Dr. Randell Pineda on 05/31/2022 for tingling in her hands and feet. She got a letter for work; but she needs a new letter with more specifics. It would need to say that she cannot push or pull patients; or roll patients back and forth; or lift patients. It also needs for a certain timeline how long she will need to do this for. Her phone is [phone]     Thank you!

## 2022-06-02 NOTE — LETTER
[address],[facility]  Phone: [phone]  Fax: [phone]    Maribel Lin MD        June 2, 2022     Patient: Michelle Lomeli   YOB: 1965   Date of Visit: 05/31/2022       To Whom It May Concern: It is my medical opinion that Cheng Mueller may return to work with the following restrictions until 06/30/2022, No lifting, carrying, pulling or rolling patients. Natty Flaherty is able to push patients in wheelchairs unless otherwise noted by our office. If you have any questions or concerns, please don't hesitate to call.     Sincerely,        Maribel Lin MD

## 2022-06-02 NOTE — TELEPHONE ENCOUNTER
QUESTIONS  Information for Provider? urgent - patient calling in regards to test   results and also about paperwork for work. alternate phone # to call   [phone]. Pt was advised letter is printed and needs to be signed by pcp. Pt called regarding results.

## 2022-06-03 DIAGNOSIS — G62.9 NEUROPATHY: Primary | ICD-10-CM

## 2022-06-03 LAB — METHYLMALONIC ACID: 0.13 UMOL/L (ref 0–0.4)

## 2022-06-06 LAB — URINE ELECTROPHORESIS INTERP: NORMAL

## 2022-06-08 ENCOUNTER — TELEPHONE (OUTPATIENT)
Dept: FAMILY MEDICINE CLINIC | Age: 57
End: 2022-06-08

## 2022-06-08 NOTE — TELEPHONE ENCOUNTER
----- Message from Bernie Ho sent at 6/8/2022  8:08 AM EDT -----  Subject: Message to Provider    QUESTIONS  Information for Provider? Patient's boyfriend will be in the office today   for a shot and she would like to give permission for him to also    the restriction form for her that has been signed by Dr. Raman Ang. ---------------------------------------------------------------------------  --------------  Jen DANIEL  What is the best way for the office to contact you? OK to leave message on   voicemail  Preferred Call Back Phone Number? [phone]  ---------------------------------------------------------------------------  --------------  SCRIPT ANSWERS  Relationship to Patient?  Self

## 2022-06-28 ENCOUNTER — OFFICE VISIT (OUTPATIENT)
Dept: FAMILY MEDICINE CLINIC | Age: 57
End: 2022-06-28
Payer: COMMERCIAL

## 2022-06-28 VITALS
OXYGEN SATURATION: 96 % | DIASTOLIC BLOOD PRESSURE: 86 MMHG | HEART RATE: 93 BPM | SYSTOLIC BLOOD PRESSURE: 130 MMHG | TEMPERATURE: 97 F | BODY MASS INDEX: 33.71 KG/M2 | RESPIRATION RATE: 14 BRPM | WEIGHT: 178.4 LBS

## 2022-06-28 DIAGNOSIS — R20.0 NUMBNESS OF FINGERS OF BOTH HANDS: ICD-10-CM

## 2022-06-28 DIAGNOSIS — F41.8 DEPRESSION WITH ANXIETY: ICD-10-CM

## 2022-06-28 DIAGNOSIS — F90.0 ATTENTION DEFICIT HYPERACTIVITY DISORDER (ADHD), PREDOMINANTLY INATTENTIVE TYPE: ICD-10-CM

## 2022-06-28 DIAGNOSIS — M50.30 DDD (DEGENERATIVE DISC DISEASE), CERVICAL: ICD-10-CM

## 2022-06-28 DIAGNOSIS — R73.9 HYPERGLYCEMIA: ICD-10-CM

## 2022-06-28 DIAGNOSIS — G62.9 NEUROPATHY: ICD-10-CM

## 2022-06-28 DIAGNOSIS — G89.29 CHRONIC FOOT PAIN, RIGHT: Primary | ICD-10-CM

## 2022-06-28 DIAGNOSIS — M79.671 CHRONIC FOOT PAIN, RIGHT: Primary | ICD-10-CM

## 2022-06-28 PROCEDURE — 99214 OFFICE O/P EST MOD 30 MIN: CPT | Performed by: FAMILY MEDICINE

## 2022-06-28 RX ORDER — METHYLPHENIDATE HYDROCHLORIDE 36 MG/1
36 TABLET ORAL EVERY MORNING
Qty: 30 TABLET | Refills: 0 | Status: SHIPPED | OUTPATIENT
Start: 2022-06-28 | End: 2022-09-14

## 2022-06-28 RX ORDER — METHYLPHENIDATE HYDROCHLORIDE 36 MG/1
36 TABLET ORAL EVERY MORNING
Qty: 30 TABLET | Refills: 0 | Status: SHIPPED | OUTPATIENT
Start: 2022-08-27 | End: 2022-10-17

## 2022-06-28 RX ORDER — METHYLPHENIDATE HYDROCHLORIDE 36 MG/1
36 TABLET ORAL EVERY MORNING
Qty: 30 TABLET | Refills: 0 | Status: SHIPPED | OUTPATIENT
Start: 2022-07-28 | End: 2022-09-14

## 2022-06-28 NOTE — LETTER
[facility] [address]  [place]  Phone: [phone]  Fax: [phone]     Dank Dowling MD           June 2, 2022      Patient: Janie Mullins   YOB: 1965   Date of Visit: 05/31/2022         To Whom It May Concern:     It is my medical opinion that Milda Buerger may return to work with the following restrictions until 7/15/2022, No lifting, carrying, pulling or rolling patients.  Deon Riser is able to push patients in wheelchairs unless otherwise noted by our office.      If you have any questions or concerns, please don't hesitate to call.     Sincerely,           Dank Dowling MD

## 2022-06-28 NOTE — PROGRESS NOTES
Here for f/u and recheck of ADD and some issues of tingling to hands bilaterally, R knee pain    Pt with some persistent issues of numbness/tingling in both hands. Sx are R >> L. Strength seems to be decreased. Pt did see dr. Madalyn Grullon a few weeks ago and did have some bloodwork that was normal and urine that was fine and is now set up for EMG/NCV, will be done tomorrow at [facility] with dr. Luan Morales. Pt is concerned that sx have increased. Pt was started on gabapentin at 100mg TID but has noted some mild improvement but makes her sleepy. Pt with breakthru sx and she does not feel sig improvement    Pt was put on light duty at work until she is able to figure out her symptoms as she is working with patients. She does have continued pain and is limited by her pain and sx in terms of work      Except as noted above in the history of present illness, the review of systems is  negative for headache, vision changes, chest pain, shortness of breath, abdominal pain, urinary sx, bowel changes. Past medical, surgical, and social history reviewed and updated  Medications and allergies reviewed and updated        O: /86   Pulse 93   Temp 97 °F (36.1 °C) (Temporal)   Resp 14   Wt 178 lb 6.4 oz (80.9 kg)   SpO2 96%   BMI 33.71 kg/m²   GEN: No acute distress, cooperative, well nourished, alert. HEENT: PEERLA, EOMI , normocephalic/atraumatic, nares and oropharynx clear. Mucous membranes normal, Tympanic membranes clear bilaterally. Neck: soft, supple, no thyromegaly, mass, no Lymphadenopathy  CV: Regular rate and rhythm, no murmur, rubs, gallops. No edema. Resp: Clear to auscultation bilaterally good air entry bilaterally  No crackles, wheeze. Breathing comfortably. Psych: mood stable, No suicidal thoughts or ideation   Musc: full range of motion bilateral upper extremities, bilateral lower extremities. Strength 4/5 bilateral upper extremities,  Neurovascularly intact.   Neg tinels/phalens        Current Outpatient Medications   Medication Sig Dispense Refill    [START ON 2022] methylphenidate (CONCERTA) 36 MG extended release tablet Take 1 tablet by mouth every morning for 30 days. 30 tablet 0    [START ON 2022] methylphenidate (CONCERTA) 36 MG extended release tablet Take 1 tablet by mouth every morning for 30 days. 30 tablet 0    methylphenidate (CONCERTA) 36 MG extended release tablet Take 1 tablet by mouth every morning for 30 days. 30 tablet 0    gabapentin (NEURONTIN) 100 MG capsule Take 1 capsule by mouth 3 times daily for 90 days. 90 capsule 2    pantoprazole (PROTONIX) 40 MG tablet TAKE 1 TABLET BY MOUTH EVERY MORNING BEFORE BREAKFAST 30 tablet 5    naproxen (NAPROSYN) 500 MG tablet Take 1 tablet by mouth 2 times daily (with meals) 60 tablet 1    albuterol sulfate HFA (PROVENTIL HFA) 108 (90 Base) MCG/ACT inhaler Inhale 2 puffs into the lungs every 6 hours as needed for Wheezing 1 each 5    ciprofloxacin (CIPRO) 500 MG tablet Take 1 tablet by mouth daily as needed (sexual activity) 30 tablet 2    desvenlafaxine succinate (PRISTIQ) 50 MG TB24 extended release tablet Take 1 tablet by mouth daily 30 tablet 5    hydrOXYzine (ATARAX) 25 MG tablet Take 1 tablet by mouth every 8 hours as needed for Anxiety 30 tablet 5     No current facility-administered medications for this visit. Orders Only on 2022   Component Date Value Ref Range Status    CRP 2022 13.2* 0.0 - 5.1 mg/L Final    Comment: WR-CRP Reference range:  30D-199Y    <5.1  <30D        Not established    CRP is used in the detection and evaluation of infection,  tissue injury, inflammatory disorders, and associated  disease. Increases in CRP values are non-specific and  should not be interpreted without a complete clinical  evaluation.    reference ranges have not been  established and values should be interpreted within clinical  context and with serial measurements, if clinically  appropriate.       CC Peptide,IgG Ab 05/31/2022 <0.5  0.0 - 2.9 U/mL Final    Vitamin B-12 05/31/2022 417  211 - 911 pg/mL Final    Total Protein 05/31/2022 6.8  6.4 - 8.2 g/dL Final    Albumin 05/31/2022 3.6  3.1 - 4.9 g/dL Final    Alpha-1-Globulin 05/31/2022 0.2  0.2 - 0.4 g/dL Final    Alpha-2-Globulin 05/31/2022 0.8  0.4 - 1.1 g/dL Final    Beta Globulin 05/31/2022 1.1  0.9 - 1.6 g/dL Final    Gamma Globulin 05/31/2022 1.1  0.6 - 1.8 g/dL Final    WBC 05/31/2022 9.1  4.0 - 11.0 K/uL Final    RBC 05/31/2022 4.48  4.00 - 5.20 M/uL Final    Hemoglobin 05/31/2022 13.4  12.0 - 16.0 g/dL Final    Hematocrit 05/31/2022 39.5  36.0 - 48.0 % Final    MCV 05/31/2022 88.3  80.0 - 100.0 fL Final    MCH 05/31/2022 29.9  26.0 - 34.0 pg Final    MCHC 05/31/2022 33.8  31.0 - 36.0 g/dL Final    RDW 05/31/2022 13.6  12.4 - 15.4 % Final    Platelets 25/71/2661 237  135 - 450 K/uL Final    MPV 05/31/2022 9.4  5.0 - 10.5 fL Final    PLATELET SLIDE REVIEW 05/31/2022 Adequate   Final    SLIDE REVIEW 05/31/2022 see below   Final    Slide review agrees with reported results    Neutrophils % 05/31/2022 63.0  % Final    Lymphocytes % 05/31/2022 27.7  % Final    Monocytes % 05/31/2022 6.6  % Final    Eosinophils % 05/31/2022 1.6  % Final    Basophils % 05/31/2022 1.1  % Final    Neutrophils Absolute 05/31/2022 5.8  1.7 - 7.7 K/uL Final    Lymphocytes Absolute 05/31/2022 2.5  1.0 - 5.1 K/uL Final    Monocytes Absolute 05/31/2022 0.6  0.0 - 1.3 K/uL Final    Eosinophils Absolute 05/31/2022 0.1  0.0 - 0.6 K/uL Final    Basophils Absolute 05/31/2022 0.1  0.0 - 0.2 K/uL Final    Protein, Ur 05/31/2022 29.00* <12 mg/dL Final    Protein, Ur 05/31/2022 0.029  <0.012 g/dL Final    Methylmalonic Acid 05/31/2022 0.13  0.00 - 0.40 umol/L Final    Comment: INTERPRETIVE INFORMATION: MMA Serum/Plasma,                            Vitamin B12 Status  This test was developed and its performance characteristics determined  by  [facility]. It has not been cleared or approved by the [employer]  and  Drug Administration. This test was performed in a CLIA certified  laboratory  and is intended for clinical purposes. Performed By: [facility] [address]  [place], [address] [facility]  : Beatriz Marina. Gloria Johnson MD      YAYA 05/31/2022 Negative  Negative Final    Comment: If POSITIVE, specimen will be sent to reference laboratory  for further testing. YAYA specimens are screened using multiplex bead immunoassay  methodology. All results reported as positive are further tested  by indirect fluorescent assay (IFA) using Hep-2 substrate with  an IgG-specific conjugate. The YAYA screen is designed to detect  antibodies against dsDNA, SS-A (Ro), SS-B (La), Garcia (Sm), SmRNP,  RNP, Scl-70, Rossy-1, Centromere, Chromatin, and Ribosomal P.      SPE/JUANA Interpretation 05/31/2022 REVIEWED   Final    Comment: The serum protein electrophoresis is unremarkable. No monoclonal band  is seen on serum protein electrophoresis. According to the International Myeloma Working Group recommended  guideline,  SPEP alone may fail to detect monoclonal band in 12% of  patients. The addition of of serum light chains (sFL) and immunofixation  increases detection to >99%. CPT: 93285    Electronically signed out by  Jordan Pendleton MD PhD     Leland Allred 05/31/2022 REVIEWED   Final    Comment: Albumin is the predominant protein on the urine protein  electrophoresis consistent with glomerular proteinuria. No monoclonal free light chain (Bence Dhaliwal protein) is  identified on immunofixation. CPT Code: C3803471, 11709    Electronically signed out by  Jordan Pendleton MD PhD            ASSESSMENT / PLAN:    1.  Attention deficit hyperactivity disorder (ADHD), predominantly inattentive type  Stable w/ concerta  refills given for 3 months  Pt aware of need for every 3 month medication followup appointments, and that medication refills for benzodiazepines, narcotics and/or stimulants will only be given at appointment. - methylphenidate (CONCERTA) 36 MG extended release tablet; Take 1 tablet by mouth every morning for 30 days. Dispense: 30 tablet; Refill: 0  - methylphenidate (CONCERTA) 36 MG extended release tablet; Take 1 tablet by mouth every morning for 30 days. Dispense: 30 tablet; Refill: 0  - methylphenidate (CONCERTA) 36 MG extended release tablet; Take 1 tablet by mouth every morning for 30 days. Dispense: 30 tablet; Refill: 0    2. Chronic foot pain, right  Check xray R foot  Management pending results. - XR FOOT RIGHT (MIN 3 VIEWS); Future    3. Numbness of fingers of both hands  Persistent sx  bloodwork normal as above  Has appt tomorrow for EMG, Management pending results. Suspect could be related to cervical degenerative disc disease     4. Neuropathy  As above  On gabapentin 100mg TID, tolerating with mild fatigue sx  Await EMG    5. Hyperglycemia  stable    6. Depression with anxiety  Mood stable with pristiq  Consider change to cymbalta if neuropathy pain sx continue    7. DDD (degenerative disc disease), cervical  Await EMG  Management pending results. May need F/u MRI           Follow-up appointment:   Pending EMG tomorrow    Discussed use, benefit, and side effects of all prescribed medications. Barriers to medication compliance addressed. All patient questions answered. Pt voiced understanding. When applicable, patient's outside records were reviewed through [facility]. The patient has signed appropriate paperworks/consents.

## 2022-06-29 ENCOUNTER — TELEPHONE (OUTPATIENT)
Dept: FAMILY MEDICINE CLINIC | Age: 57
End: 2022-06-29

## 2022-06-29 ENCOUNTER — PROCEDURE VISIT (OUTPATIENT)
Dept: NEUROLOGY | Age: 57
End: 2022-06-29
Payer: COMMERCIAL

## 2022-06-29 DIAGNOSIS — R20.2 BILATERAL NUMBNESS AND TINGLING OF ARMS AND LEGS: Primary | ICD-10-CM

## 2022-06-29 DIAGNOSIS — R20.0 BILATERAL NUMBNESS AND TINGLING OF ARMS AND LEGS: Primary | ICD-10-CM

## 2022-06-29 PROCEDURE — 95912 NRV CNDJ TEST 11-12 STUDIES: CPT | Performed by: PSYCHIATRY & NEUROLOGY

## 2022-06-29 PROCEDURE — 95886 MUSC TEST DONE W/N TEST COMP: CPT | Performed by: PSYCHIATRY & NEUROLOGY

## 2022-06-29 NOTE — PROGRESS NOTES
Juan A Thorpe M.D. [facility]) Physicians/[facility]  Board Certified in [address] [facility] [address] [place] [address], [address] [place] [address]    EMG / NERVE CONDUCTION STUDY      PATIENT:  Kody Denise       DATE OF EM22     YOB: 1965       REASON FOR EMG:   Tingling and numbness of both arms and both legs. Right side is worse than the left side. Please do EMG nerve conduction studies of both upper and lower extremities. REFERRING PHYSICIAN:  Vanesa Rooney MD  [address]     SUMMARY:   Bilateral median motor nerve studies were normal.  The slightly low amplitude of the right median motor nerve study is a nonspecific finding. The right median sensory nerve study was normal.  The left median sensory nerve study had a slightly prolonged distal latency. Bilateral ulnar motor and sensory nerve studies were normal.  The right peroneal and posterior tibial motor nerve studies were normal.  The right sural sensory nerve study was normal.  Needle EMG of several muscles in both upper extremities as well as right lower extremity was normal.      CLINICAL DIAGNOSIS:  Unspecified neuropathy        EMG RESULTS:     This patient has a mild left median nerve lesion at the wrist.  (Carpal tunnel syndrome). The right side is within normal limits. It should be noted that the patient is more symptomatic on the right side. There is no electrophysiological evidence for peripheral neuropathy in the lower extremity.        ---------------------------------------------  Juan A Thorpe M.D.   Electromyographer / Neurologist

## 2022-06-29 NOTE — PATIENT INSTRUCTIONS
Verbal consent was obtained from patient and/or patient's advocate for in office procedure with Dr. Alana Campos (EMG or EEG).

## 2022-06-29 NOTE — TELEPHONE ENCOUNTER
Message sent to pt on [employer]. Saw that you saw her yesterday. EMG normal except mild left CTS. Neurology referral?  Would you like me to handle this or do you want to take it from here.

## 2022-07-08 ENCOUNTER — HOSPITAL ENCOUNTER (OUTPATIENT)
Age: 57
Discharge: HOME | End: 2022-07-08
Payer: COMMERCIAL

## 2022-07-08 ENCOUNTER — HOSPITAL ENCOUNTER (OUTPATIENT)
Dept: GENERAL RADIOLOGY | Age: 57
Discharge: HOME | End: 2022-07-08
Payer: COMMERCIAL

## 2022-07-08 DIAGNOSIS — M79.671 CHRONIC FOOT PAIN, RIGHT: ICD-10-CM

## 2022-07-08 DIAGNOSIS — G89.29 CHRONIC FOOT PAIN, RIGHT: ICD-10-CM

## 2022-07-08 PROCEDURE — 73630 X-RAY EXAM OF FOOT: CPT

## 2022-07-18 ENCOUNTER — OFFICE VISIT (OUTPATIENT)
Dept: FAMILY MEDICINE CLINIC | Age: 57
End: 2022-07-18
Payer: COMMERCIAL

## 2022-07-18 VITALS
BODY MASS INDEX: 33.56 KG/M2 | HEART RATE: 90 BPM | DIASTOLIC BLOOD PRESSURE: 78 MMHG | WEIGHT: 177.6 LBS | RESPIRATION RATE: 12 BRPM | TEMPERATURE: 97.9 F | OXYGEN SATURATION: 98 % | SYSTOLIC BLOOD PRESSURE: 118 MMHG

## 2022-07-18 DIAGNOSIS — R20.0 NUMBNESS OF FINGERS OF BOTH HANDS: ICD-10-CM

## 2022-07-18 DIAGNOSIS — R35.0 FREQUENT URINATION: Primary | ICD-10-CM

## 2022-07-18 DIAGNOSIS — Z12.31 ENCOUNTER FOR SCREENING MAMMOGRAM FOR MALIGNANT NEOPLASM OF BREAST: ICD-10-CM

## 2022-07-18 DIAGNOSIS — R10.9 RIGHT FLANK PAIN: ICD-10-CM

## 2022-07-18 DIAGNOSIS — R31.29 MICROSCOPIC HEMATURIA: ICD-10-CM

## 2022-07-18 DIAGNOSIS — G62.9 NEUROPATHY: ICD-10-CM

## 2022-07-18 DIAGNOSIS — M70.62 GREATER TROCHANTERIC BURSITIS OF LEFT HIP: ICD-10-CM

## 2022-07-18 DIAGNOSIS — Z98.890 STATUS POST HIP SURGERY: ICD-10-CM

## 2022-07-18 LAB
BILIRUBIN, POC: NEGATIVE
BLOOD URINE, POC: ABNORMAL
CLARITY, POC: CLEAR
COLOR, POC: YELLOW
GLUCOSE URINE, POC: NEGATIVE
KETONES, POC: NEGATIVE
LEUKOCYTE EST, POC: NEGATIVE
NITRITE, POC: NEGATIVE
PH, POC: 5.5
PROTEIN, POC: NEGATIVE
SPECIFIC GRAVITY, POC: 1.03
UROBILINOGEN, POC: 0.2

## 2022-07-18 PROCEDURE — 99214 OFFICE O/P EST MOD 30 MIN: CPT | Performed by: FAMILY MEDICINE

## 2022-07-18 PROCEDURE — 81002 URINALYSIS NONAUTO W/O SCOPE: CPT | Performed by: FAMILY MEDICINE

## 2022-07-18 RX ORDER — HYDROCODONE BITARTRATE AND ACETAMINOPHEN 5; 325 MG/1; MG/1
1 TABLET ORAL EVERY 6 HOURS PRN
Qty: 20 TABLET | Refills: 0 | Status: SHIPPED | OUTPATIENT
Start: 2022-07-18 | End: 2022-07-23

## 2022-07-18 ASSESSMENT — PATIENT HEALTH QUESTIONNAIRE - PHQ9
SUM OF ALL RESPONSES TO PHQ QUESTIONS 1-9: 0
10. IF YOU CHECKED OFF ANY PROBLEMS, HOW DIFFICULT HAVE THESE PROBLEMS MADE IT FOR YOU TO DO YOUR WORK, TAKE CARE OF THINGS AT HOME, OR GET ALONG WITH OTHER PEOPLE: 0
3. TROUBLE FALLING OR STAYING ASLEEP: 0
8. MOVING OR SPEAKING SO SLOWLY THAT OTHER PEOPLE COULD HAVE NOTICED. OR THE OPPOSITE, BEING SO FIGETY OR RESTLESS THAT YOU HAVE BEEN MOVING AROUND A LOT MORE THAN USUAL: 0
2. FEELING DOWN, DEPRESSED OR HOPELESS: 0
4. FEELING TIRED OR HAVING LITTLE ENERGY: 0
6. FEELING BAD ABOUT YOURSELF - OR THAT YOU ARE A FAILURE OR HAVE LET YOURSELF OR YOUR FAMILY DOWN: 0
1. LITTLE INTEREST OR PLEASURE IN DOING THINGS: 0
SUM OF ALL RESPONSES TO PHQ QUESTIONS 1-9: 0
9. THOUGHTS THAT YOU WOULD BE BETTER OFF DEAD, OR OF HURTING YOURSELF: 0
7. TROUBLE CONCENTRATING ON THINGS, SUCH AS READING THE NEWSPAPER OR WATCHING TELEVISION: 0
SUM OF ALL RESPONSES TO PHQ QUESTIONS 1-9: 0
SUM OF ALL RESPONSES TO PHQ9 QUESTIONS 1 & 2: 0
SUM OF ALL RESPONSES TO PHQ QUESTIONS 1-9: 0
5. POOR APPETITE OR OVEREATING: 0

## 2022-07-18 NOTE — PROGRESS NOTES
Here for eval of some urinary frequency. Pt states that she was going to see the neurologist today for eval of numbness/tingling, but that was cancelled as the doctor developed COVID. She will see someone else in a few weeks. Pt did have EMG. Pts EMG showed mild carpal tunnel syndrome on L side, but otherwise was normal.  Pt wonders if sx are related to fibromyalgia. Pt does struggle a bit to work, but states that she is pushing through. Pt is able to function, do her job. 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 Sx are on R flank, and is concerned d/t kidney cysts and history of nephrolithiasis. No dysuria, no frequency      Except as noted above in the history of present illness, the review of systems is  negative for headache, vision changes, chest pain, shortness of breath, abdominal pain, urinary sx, bowel changes. Past medical, surgical, and social history reviewed and updated  Medications and allergies reviewed and updated      O: /78   Pulse 90   Temp 97.9 °F (36.6 °C) (Temporal)   Resp 12   Wt 177 lb 9.6 oz (80.6 kg)   SpO2 98%   BMI 33.56 kg/m²   GEN: No acute distress, cooperative, well nourished, alert. HEENT: PEERLA, EOMI , normocephalic/atraumatic, nares and oropharynx clear. Mucous membranes normal, Tympanic membranes clear bilaterally. Neck: soft, supple, no thyromegaly, mass, no Lymphadenopathy  CV: Regular rate and rhythm, no murmur, rubs, gallops. No edema. Resp: Clear to auscultation bilaterally good air entry bilaterally  No crackles, wheeze. Breathing comfortably. Psych: mood stable, No suicidal thoughts or ideation   Abd: soft, mild generalized tender to palpation. normoactive bowels sounds.  No hepatosplenomegaly no costovertebral angle tenderness       Current Outpatient Medications   Medication Sig Dispense Refill    HYDROcodone-acetaminophen (NORCO) 5-325 MG per tablet Take 1 tablet by mouth every 6 hours as needed for Pain for up to 5 days. 20 tablet 0    [START ON 8/27/2022] methylphenidate (CONCERTA) 36 MG extended release tablet Take 1 tablet by mouth every morning for 30 days. 30 tablet 0    [START ON 7/28/2022] methylphenidate (CONCERTA) 36 MG extended release tablet Take 1 tablet by mouth every morning for 30 days. 30 tablet 0    methylphenidate (CONCERTA) 36 MG extended release tablet Take 1 tablet by mouth every morning for 30 days. 30 tablet 0    pantoprazole (PROTONIX) 40 MG tablet TAKE 1 TABLET BY MOUTH EVERY MORNING BEFORE BREAKFAST 30 tablet 5    naproxen (NAPROSYN) 500 MG tablet Take 1 tablet by mouth 2 times daily (with meals) 60 tablet 1    albuterol sulfate HFA (PROVENTIL HFA) 108 (90 Base) MCG/ACT inhaler Inhale 2 puffs into the lungs every 6 hours as needed for Wheezing 1 each 5    desvenlafaxine succinate (PRISTIQ) 50 MG TB24 extended release tablet Take 1 tablet by mouth daily 30 tablet 5    hydrOXYzine (ATARAX) 25 MG tablet Take 1 tablet by mouth every 8 hours as needed for Anxiety 30 tablet 5    gabapentin (NEURONTIN) 100 MG capsule Take 1 capsule by mouth 3 times daily for 90 days. (Patient not taking: Reported on 7/18/2022) 90 capsule 2    ciprofloxacin (CIPRO) 500 MG tablet Take 1 tablet by mouth daily as needed (sexual activity) (Patient not taking: Reported on 7/18/2022) 30 tablet 2     No current facility-administered medications for this visit. Results for POC orders placed in visit on 07/18/22   POCT Urinalysis no Micro   Result Value Ref Range    Color, UA yellow     Clarity, UA clear     Glucose, UA POC negative     Bilirubin, UA negative     Ketones, UA negative     Spec Grav, UA 1.030     Blood, UA POC Trace     pH, UA 5.5     Protein, UA POC negative     Urobilinogen, UA 0.2     Leukocytes, UA negative     Nitrite, UA negative         ASSESSMENT / PLAN:    1.  Frequent urination  UA neg  Exam nonfocal  Check CT abd/pelvis as below  - POCT Urinalysis no Micro  - CT ABDOMEN PELVIS W IV CONTRAST; Future    2. Microscopic hematuria  As above  Check CT  - CT ABDOMEN PELVIS W IV CONTRAST; Future    3. Right flank pain  Check CT to r/o stone, cyst  Management pending results. - CT ABDOMEN PELVIS W IV CONTRAST; Future    4. Numbness of fingers of both hands  EMG negative/nonfocal  Reviewed normal EMG    5. Neuropathy  As above    6. Status post hip surgery  - HYDROcodone-acetaminophen (NORCO) 5-325 MG per tablet; Take 1 tablet by mouth every 6 hours as needed for Pain for up to 5 days. Dispense: 20 tablet; Refill: 0    7. Greater trochanteric bursitis of left hip  - HYDROcodone-acetaminophen (NORCO) 5-325 MG per tablet; Take 1 tablet by mouth every 6 hours as needed for Pain for up to 5 days. Dispense: 20 tablet; Refill: 0    8. Encounter for screening mammogram for malignant neoplasm of breast  Due mammo  Management pending results. Follow-up appointment:   Pending CT results      Discussed use, benefit, and side effects of all prescribed medications. Barriers to medication compliance addressed. All patient questions answered. Pt voiced understanding. When applicable, patient's outside records were [facility]. The patient has signed appropriate paperworks/consents.

## 2022-07-22 ENCOUNTER — TELEPHONE (OUTPATIENT)
Dept: FAMILY MEDICINE CLINIC | Age: 57
End: 2022-07-22

## 2022-07-22 DIAGNOSIS — G62.9 NEUROPATHY: ICD-10-CM

## 2022-07-22 DIAGNOSIS — R20.0 NUMBNESS OF FINGERS OF BOTH HANDS: Primary | ICD-10-CM

## 2022-07-22 NOTE — TELEPHONE ENCOUNTER
Pt was requesting referral to Neurology other than [facility] because they keep canceling appointments.

## 2022-07-22 NOTE — TELEPHONE ENCOUNTER
----- Message from Julieth Ly sent at 7/22/2022  9:04 AM EDT -----  Subject: Referral Request    Reason for referral request? Pt is calling in needing a referral for a   Urologists. Please advise. Provider patient wants to be referred to(if known):     Provider Phone Number(if known): Additional Information for Provider? Pt needing a new referral for a new   Urologist. States the urologist PCP referred has cancelled on her twice. Pt states she is working and would like info left on [employer].  Please   advise.   ---------------------------------------------------------------------------  --------------  Aranza Sorto [facility]    [phone]; OK to leave message on [employer]  ---------------------------------------------------------------------------  --------------

## 2022-09-12 ENCOUNTER — TELEPHONE (OUTPATIENT)
Dept: FAMILY MEDICINE CLINIC | Age: 57
End: 2022-09-12

## 2022-09-12 NOTE — TELEPHONE ENCOUNTER
----- Message from Lopez Pardo sent at 9/9/2022 11:04 AM EDT -----  Subject: Message to Provider    QUESTIONS  Information for Provider? Pt is requesting if any appt if the 14th is open   please contact the pt.   ---------------------------------------------------------------------------  --------------  Yosi Ordonezole INFO  [phone]; OK to leave message on voicemail  ---------------------------------------------------------------------------  --------------  SCRIPT ANSWERS  Relationship to Patient?  Self

## 2022-09-14 ENCOUNTER — TELEPHONE (OUTPATIENT)
Dept: ORTHOPEDIC SURGERY | Age: 57
End: 2022-09-14

## 2022-09-14 ENCOUNTER — OFFICE VISIT (OUTPATIENT)
Dept: ORTHOPEDIC SURGERY | Age: 57
End: 2022-09-14
Payer: COMMERCIAL

## 2022-09-14 VITALS — WEIGHT: 177 LBS | HEIGHT: 61 IN | BODY MASS INDEX: 33.42 KG/M2 | RESPIRATION RATE: 12 BRPM

## 2022-09-14 DIAGNOSIS — Z98.890 STATUS POST HIP SURGERY: Primary | ICD-10-CM

## 2022-09-14 DIAGNOSIS — S73.191A TEAR OF RIGHT ACETABULAR LABRUM, INITIAL ENCOUNTER: ICD-10-CM

## 2022-09-14 DIAGNOSIS — M25.559 HIP PAIN: ICD-10-CM

## 2022-09-14 PROCEDURE — 99214 OFFICE O/P EST MOD 30 MIN: CPT | Performed by: ORTHOPAEDIC SURGERY

## 2022-09-14 NOTE — TELEPHONE ENCOUNTER
S/w David Ireland  regarding MRI Arthrogram Right Hip approval and authorization being valid until 09/14/2023. Patient was instructed that their MRI needs to be scheduled at [facility]. The patient was instructed to contact the facility to schedule  at [phone]. A follow up appointment will need to be scheduled to review the results and treatment plan. The patient has elected to contact the office at a later time to schedule a follow up appointment.

## 2022-09-15 NOTE — PROGRESS NOTES
Chief Complaint  Hip Pain (F/u right hip. Post-op right hip 16 months. New x-rays bilateral hips today )      History of Present Illness:  Cecilia Rebolledo is a pleasant 61 y.o. female is 1.5  year post right hip arthroscopy pincer decompression and labral repair. She has been doing great. However in the past 2 months  has been having worsening lateral sided hip pain and recurrent weather related changes/stiffness and groin pain, with no inciting incident. She believes its been due to heavy lifting at work. She works as an [employer]. No numbness or tingling. No bowel or bladder symptoms. Medical History:  Patient's medications, allergies, past medical, surgical, social and family histories were reviewed and updated as appropriate. Pain Assessment  Location of Pain: Hip  Location Modifiers: Right  Severity of Pain: 5  Quality of Pain: Throbbing  Duration of Pain: Persistent  Frequency of Pain: Constant  Aggravating Factors: Bending, Other (Comment) (Sitting)  Limiting Behavior: Some  Relieving Factors: Rest  Result of Injury: No  Work-Related Injury: No  Are there other pain locations you wish to document?: No  ROS: Review of systems reviewed from Patient History Form completed today and available in the patient's chart under the Media tab. Pertinent items are noted in HPI  Review of systems reviewed from Patient History Form completed today and available in the patient's chart under the Media tab. Vital Signs:  Resp 12   Ht 5' 1\" (1.549 m)   Wt 177 lb (80.3 kg)   BMI 33.44 kg/m²         Neuro: Alert & oriented x 3,  normal,  no focal deficits noted. Normal affect. Eyes: sclera clear  Ears: Normal external ear  Mouth:  No perioral lesions  Pulm: Respirations unlabored and regular  Pulse: Extremities well perfused. 2+ peripheral pulses. Skin: Warm. No ulcerations. Constitutional: The physical examination finds the patient to be well-developed and well-nourished.   The patient is alert and oriented x3 and was cooperative throughout the visit. Hip Examination: bilateral    Skin/Inspection: No skin lesions, cellulitis, or extreme edema in the lower extremities. Standing/Walking: normal gait, negative Trendelenburg sign. Supine/Side Lying Exam: Non tender around the major bony prominences  full range of motion  FADIR positive  CIARA Negative  Resisted Abduction 5/5   Resisted Adduction 5/5   Resisted  Flexion 5/5  severe tender at greater trochanters bilaterally    Distal Neurovascular exam is intact (foot sensation, pulses, and motor exam)       Diagnostics:  New AP pelvis/keyes views of the right hip show. Decompressed pincer, minimal joint space narrowing, tonnis 1, no cysts. No acute findings. Assessment: Patient is a 61 y.o. female who is 1.5 year post right hip arthroscopy for pincer FORTINO and labral repair. She has made terrific functional gains but is now having    signs and symptoms of bilateral greater trochanteric pain syndrome for the past 2 months . And recurrent FORTINO symptoms. She might be having progressive chondramalacia to her hip, otherwise not visible on xrays. Impression:  Visit Diagnoses         Codes    Status post hip surgery    -  Primary Z98.890    Tear of right acetabular labrum, initial encounter     S73.191A    Hip pain     M25.559            Office Procedures:  No orders of the defined types were placed in this encounter.     Orders Placed This Encounter   Procedures    XR HIP 2-3 VW W PELVIS RIGHT     ROOM 15     Standing Status:   Future     Number of Occurrences:   1     Standing Expiration Date:   9/14/2022    XR HIP 2-3 VW W PELVIS LEFT     ROOM 15     Standing Status:   Future     Number of Occurrences:   1     Standing Expiration Date:   9/14/2022    MRI HIP RIGHT W CONTRAST     Standing Status:   Future     Standing Expiration Date:   9/14/2023     Scheduling Instructions:      [address] [facility]      [address] [place] [address]            MRI ARTHROGRAM RIGHT HIP W/ CONTRAST       R/O LABRAL TEAR            AUTH #:       TIME AND DATE TBD       PLEASE CALL PATIENT ONCE APPROVED TO SCHEDULE             PLEASE NOTE: IMAGING RESULTS ARE NOT GIVEN OVER THE PHONE. AN IN-OFFICE APPOINTMENT IS REQUIRED UNLESS OTHER ARRANGEMENTS ARE PREVIOUSLY MADE. PLEASE SCHEDULE THIS PRIOR TO LEAVING THE OFFICE TODAY. Order Specific Question:   STAT Creatinine as needed:     Answer:   No     Order Specific Question:   What is the sedation requirement? Answer:   None    IR INJ ARTHROGRAM HIP RIGHT     Standing Status:   Future     Standing Expiration Date:   9/14/2023     Scheduling Instructions:      [address] [facility]      [address]      [place], [place] [address]            MRI ARTHROGRAM RIGHT HIP W/ CONTRAST       R/O LABRAL TEAR            AUTH #:       TIME AND DATE TBD       PLEASE CALL PATIENT ONCE APPROVED TO SCHEDULE             PLEASE NOTE: IMAGING RESULTS ARE NOT GIVEN OVER THE PHONE. AN IN-OFFICE APPOINTMENT IS REQUIRED UNLESS OTHER ARRANGEMENTS ARE PREVIOUSLY MADE. PLEASE SCHEDULE THIS PRIOR TO LEAVING THE OFFICE TODAY. Plan:  Pertinent imaging was reviewed. The etiology, natural history, and treatment options for the disorder were discussed. The roles of activity modification, antiinflammatory medicine, injections, bracing, physical therapy, and surgical interventions were all described to the patient and questions were answered. We believe Amanda Tate is a candidate for further evaluation with advanced imaging of the right Hip. My recommendation is an MRI of the involved joint(s). This diagnostic imaging modality is now out of medical necessity given the patient's persistent pain and dysfunction, and failure of symptoms to resolve with rest, activity modification, NSAIDS, ROM, Exercises, Strenghtening, and Flexibility.     We will organize the MRI arthrogram of the right hip and then we will see the patient back in my office to review the findings and outline the best treatment strategy. All the patient's questions were answered while in the clinic. The patient is understanding of all instructions and agrees with the plan. Approximately 30 minutes was spent on patient education and coordinating care. Follow up in: Return for TR MRI ARTHROGRAM RIGHT HIP. At that time we will get repeat radiographs and new outcome scores. Should her symptoms from the greater trochanteric pain syndrome fail to improve we can offer an office guided cortisone injection      Sincerely,    Serenity Vang MD [address] [place] [address]  Email: [email or web address]  Office: [phone]    09/15/22  12:34 PM        The encounter with Lorrie People was carried out by myself, Dr Bolling Cranker, who personally examined the patient and reviewed the plan. This dictation was performed with a verbal recognition program (DRAGON) and it was checked for errors. It is possible that there are still dictated errors within this office note. If so, please bring any errors to my attention for an addendum. All efforts were made to ensure that this office note is accurate.

## 2022-09-19 ENCOUNTER — OFFICE VISIT (OUTPATIENT)
Dept: FAMILY MEDICINE CLINIC | Age: 57
End: 2022-09-19
Payer: COMMERCIAL

## 2022-09-19 VITALS
BODY MASS INDEX: 34.62 KG/M2 | WEIGHT: 183.2 LBS | DIASTOLIC BLOOD PRESSURE: 72 MMHG | RESPIRATION RATE: 12 BRPM | HEART RATE: 90 BPM | TEMPERATURE: 97.9 F | SYSTOLIC BLOOD PRESSURE: 122 MMHG | OXYGEN SATURATION: 95 %

## 2022-09-19 DIAGNOSIS — M79.18 MYOFASCIAL PAIN: ICD-10-CM

## 2022-09-19 DIAGNOSIS — R73.9 HYPERGLYCEMIA: ICD-10-CM

## 2022-09-19 DIAGNOSIS — R20.0 NUMBNESS OF FINGERS OF BOTH HANDS: Primary | ICD-10-CM

## 2022-09-19 DIAGNOSIS — M25.551 RIGHT HIP PAIN: ICD-10-CM

## 2022-09-19 DIAGNOSIS — F90.0 ATTENTION DEFICIT HYPERACTIVITY DISORDER (ADHD), PREDOMINANTLY INATTENTIVE TYPE: ICD-10-CM

## 2022-09-19 DIAGNOSIS — F33.0 MAJOR DEPRESSIVE DISORDER, RECURRENT EPISODE, MILD (HCC): ICD-10-CM

## 2022-09-19 DIAGNOSIS — R06.83 SNORING: ICD-10-CM

## 2022-09-19 DIAGNOSIS — G62.9 NEUROPATHY: ICD-10-CM

## 2022-09-19 PROCEDURE — 99214 OFFICE O/P EST MOD 30 MIN: CPT | Performed by: FAMILY MEDICINE

## 2022-09-19 PROCEDURE — 90471 IMMUNIZATION ADMIN: CPT | Performed by: FAMILY MEDICINE

## 2022-09-19 PROCEDURE — 90674 CCIIV4 VAC NO PRSV 0.5 ML IM: CPT | Performed by: FAMILY MEDICINE

## 2022-09-19 RX ORDER — METHYLPHENIDATE HYDROCHLORIDE 36 MG/1
36 TABLET ORAL EVERY MORNING
Qty: 30 TABLET | Refills: 0 | Status: SHIPPED | OUTPATIENT
Start: 2022-09-19 | End: 2022-10-17

## 2022-09-19 RX ORDER — METHYLPHENIDATE HYDROCHLORIDE 36 MG/1
36 TABLET ORAL EVERY MORNING
Qty: 30 TABLET | Refills: 0 | Status: CANCELLED | OUTPATIENT
Start: 2022-11-18 | End: 2022-12-18

## 2022-09-19 RX ORDER — GABAPENTIN 300 MG/1
300 CAPSULE ORAL 3 TIMES DAILY
Qty: 90 CAPSULE | Refills: 5 | Status: SHIPPED | OUTPATIENT
Start: 2022-09-19 | End: 2023-03-18

## 2022-09-19 RX ORDER — METHYLPHENIDATE HYDROCHLORIDE 36 MG/1
36 TABLET ORAL EVERY MORNING
Qty: 30 TABLET | Refills: 0 | Status: CANCELLED | OUTPATIENT
Start: 2022-10-19 | End: 2022-11-18

## 2022-09-19 SDOH — ECONOMIC STABILITY: FOOD INSECURITY: WITHIN THE PAST 12 MONTHS, YOU WORRIED THAT YOUR FOOD WOULD RUN OUT BEFORE YOU GOT MONEY TO BUY MORE.: NEVER TRUE

## 2022-09-19 SDOH — ECONOMIC STABILITY: FOOD INSECURITY: WITHIN THE PAST 12 MONTHS, THE FOOD YOU BOUGHT JUST DIDN'T LAST AND YOU DIDN'T HAVE MONEY TO GET MORE.: NEVER TRUE

## 2022-09-19 ASSESSMENT — PATIENT HEALTH QUESTIONNAIRE - PHQ9
7. TROUBLE CONCENTRATING ON THINGS, SUCH AS READING THE NEWSPAPER OR WATCHING TELEVISION: 0
2. FEELING DOWN, DEPRESSED OR HOPELESS: 0
SUM OF ALL RESPONSES TO PHQ9 QUESTIONS 1 & 2: 0
1. LITTLE INTEREST OR PLEASURE IN DOING THINGS: 0
10. IF YOU CHECKED OFF ANY PROBLEMS, HOW DIFFICULT HAVE THESE PROBLEMS MADE IT FOR YOU TO DO YOUR WORK, TAKE CARE OF THINGS AT HOME, OR GET ALONG WITH OTHER PEOPLE: 0
SUM OF ALL RESPONSES TO PHQ QUESTIONS 1-9: 0
9. THOUGHTS THAT YOU WOULD BE BETTER OFF DEAD, OR OF HURTING YOURSELF: 0
4. FEELING TIRED OR HAVING LITTLE ENERGY: 0
SUM OF ALL RESPONSES TO PHQ QUESTIONS 1-9: 0
3. TROUBLE FALLING OR STAYING ASLEEP: 0
6. FEELING BAD ABOUT YOURSELF - OR THAT YOU ARE A FAILURE OR HAVE LET YOURSELF OR YOUR FAMILY DOWN: 0
SUM OF ALL RESPONSES TO PHQ QUESTIONS 1-9: 0
SUM OF ALL RESPONSES TO PHQ QUESTIONS 1-9: 0
8. MOVING OR SPEAKING SO SLOWLY THAT OTHER PEOPLE COULD HAVE NOTICED. OR THE OPPOSITE, BEING SO FIGETY OR RESTLESS THAT YOU HAVE BEEN MOVING AROUND A LOT MORE THAN USUAL: 0
5. POOR APPETITE OR OVEREATING: 0

## 2022-09-19 ASSESSMENT — SOCIAL DETERMINANTS OF HEALTH (SDOH): HOW HARD IS IT FOR YOU TO PAY FOR THE VERY BASICS LIKE FOOD, HOUSING, MEDICAL CARE, AND HEATING?: NOT HARD AT ALL

## 2022-09-19 NOTE — PROGRESS NOTES
Here for f/u and recheck ADD, mood,     Pt did see dr. Alexey Sanchez at neurology for eval of numbness/tingling of bilateral upper extremities ,bilateral lower extremities. Pt did have EMG showing mild carpal tunnel syndrome but no other concerns/findings. Pt did see neurology but felt that they did not give her a full evaluation. They said that there was 'nothing wrong with her'. He did some bloodwork and was told that her vit D was a little bit low but otherwise was normal.  Pt felt disrespected and dismissed. Pt also work with dr. Arianna Chao for issues of chronic right hip pain. Pt had surgery about 2 yrs ago and things have been doing ok but with some mild breakthru sx and pain / weakness. They did recommend a follow up MRI with cortisone injection to see if it provides relief     Pt feels tired a lot, does snore and  feels that it is intermittent. Pt feels she is not well rested and is tired during the day      Except as noted above in the history of present illness, the review of systems is  negative for headache, vision changes, chest pain, shortness of breath, abdominal pain, urinary sx, bowel changes. Past medical, surgical, and social history reviewed and updated  Medications and allergies reviewed and updated      O: /72   Pulse 90   Temp 97.9 °F (36.6 °C) (Temporal)   Resp 12   Wt 183 lb 3.2 oz (83.1 kg)   SpO2 95%   BMI 34.62 kg/m²   GEN: No acute distress, cooperative, well nourished, alert. HEENT: PEERLA, EOMI , normocephalic/atraumatic, nares and oropharynx clear. Mucous membranes normal, Tympanic membranes clear bilaterally. Neck: soft, supple, no thyromegaly, mass, no Lymphadenopathy  CV: Regular rate and rhythm, no murmur, rubs, gallops. No edema. Resp: Clear to auscultation bilaterally good air entry bilaterally  No crackles, wheeze. Breathing comfortably. Psych: mood stable, No suicidal thoughts or ideation       ASSESSMENT / PLAN:    1.  Attention deficit hyperactivity disorder (ADHD), predominantly inattentive type  Stable with concerta  refills given for 1 month as we will be seeing her back in 1 month for reeval  - methylphenidate (CONCERTA) 36 MG extended release tablet; Take 1 tablet by mouth every morning for 30 days. Dispense: 30 tablet; Refill: 0    2. Numbness of fingers of both hands  Chronic  EMG nonfocal  Neurology eval nonfocal  bloodwork normal  ? If some degree of myofascial pain  Trial gabapentin 300mg TID taper as discussed  Discussed use, benefit, and side effects of prescribed medications. Barriers to medication compliance addressed. All patient questions answered. Pt voiced understanding. F/u 1 month    3. Neuropathy  As above    4. Right hip pain  F/u with ortho, getting MRI/injection    5. Major depressive disorder, recurrent episode, mild (HCC)  stable    6. Hyperglycemia  stable    7. Myofascial pain  As above  Monitor with start gabapentin    8. Snoring  Concern for obstructive sleep apnea  Pt deferring eval  Aware risk of untreated obstructive sleep apnea            Follow-up appointment: 1 month/prn    Discussed use, benefit, and side effects of all prescribed medications. Barriers to medication compliance addressed. All patient questions answered. Pt voiced understanding. When applicable, patient's outside records were reviewed through [facility]. The patient has signed appropriate paperworks/consents.

## 2022-09-27 ENCOUNTER — TELEPHONE (OUTPATIENT)
Dept: ORTHOPEDIC SURGERY | Age: 57
End: 2022-09-27

## 2022-09-27 DIAGNOSIS — M25.559 HIP PAIN: Primary | ICD-10-CM

## 2022-09-27 NOTE — TELEPHONE ENCOUNTER
Patient asking for medication to undergo MRI. 1 dose 5 mg valium loaded and pended. Please advise.      Mri schedule for 9/30/2022

## 2022-09-27 NOTE — TELEPHONE ENCOUNTER
General Question     Subject: PATIENT HAS A QUESTION SHE NEEDS TO KNOW IF SHE CAN GET SOME KIND OF VALIUM FOR HER MRI SHE IS HAVING ON 09/30/22 SHE STATED SHE DON'T LIKE NEEDLES AND SHE WOULD FEEL BETTER IF SHE HAD SOME KIND OF MEDICATION FOR HER ANXIETY. HER PHARMACY IS ON FILE. PLEASE ADVISE.   Patient Kezia Koch  Contact Number: [phone]

## 2022-09-28 RX ORDER — DIAZEPAM 5 MG/1
5 TABLET ORAL EVERY 8 HOURS PRN
Qty: 1 TABLET | Refills: 0 | Status: SHIPPED | OUTPATIENT
Start: 2022-09-28 | End: 2022-10-01

## 2022-09-29 ENCOUNTER — OFFICE VISIT (OUTPATIENT)
Dept: FAMILY MEDICINE CLINIC | Age: 57
End: 2022-09-29
Payer: COMMERCIAL

## 2022-09-29 VITALS
SYSTOLIC BLOOD PRESSURE: 116 MMHG | OXYGEN SATURATION: 97 % | DIASTOLIC BLOOD PRESSURE: 78 MMHG | HEART RATE: 97 BPM | WEIGHT: 178.4 LBS | TEMPERATURE: 97.5 F | BODY MASS INDEX: 33.71 KG/M2

## 2022-09-29 DIAGNOSIS — M25.551 RIGHT HIP PAIN: ICD-10-CM

## 2022-09-29 DIAGNOSIS — J06.9 ACUTE URI: Primary | ICD-10-CM

## 2022-09-29 DIAGNOSIS — R49.0 HOARSENESS: ICD-10-CM

## 2022-09-29 DIAGNOSIS — Z12.31 ENCOUNTER FOR SCREENING MAMMOGRAM FOR MALIGNANT NEOPLASM OF BREAST: ICD-10-CM

## 2022-09-29 DIAGNOSIS — R09.89 CHEST CONGESTION: ICD-10-CM

## 2022-09-29 PROCEDURE — 99214 OFFICE O/P EST MOD 30 MIN: CPT | Performed by: FAMILY MEDICINE

## 2022-09-29 RX ORDER — DOXYCYCLINE HYCLATE 100 MG
100 TABLET ORAL 2 TIMES DAILY
Qty: 20 TABLET | Refills: 0 | Status: SHIPPED | OUTPATIENT
Start: 2022-09-29 | End: 2022-10-09

## 2022-09-29 RX ORDER — HYDROCODONE POLISTIREX AND CHLORPHENIRAMINE POLISTIREX 10; 8 MG/5ML; MG/5ML
5 SUSPENSION, EXTENDED RELEASE ORAL EVERY 12 HOURS PRN
Qty: 60 ML | Refills: 0 | Status: SHIPPED | OUTPATIENT
Start: 2022-09-29 | End: 2022-10-06

## 2022-09-29 NOTE — PROGRESS NOTES
Here for eval of URI sx, present for about 4-5d. Sx started with nasal congestion and sinus pressure and some pressure in sinuses and now down into chest.  Pt is cough with production of yellow/green sputum. No shortness of breath. No hemptysis. Sinus sx have improved but with lingering chest sx. Pt using mucinex and dayquil with help. No issues of shortness of breath. Some mild hoarseness. Did do a few COVID testing and was negative. Pt has had some exposure at work to people with similar sx. No COVID exposure     Pt is set for MRI tomorrow of R hip for evaluation of pain. Pt will be having MRI and injection tomorrow      Except as noted above in the history of present illness, the review of systems is  negative for headache, vision changes, chest pain, shortness of breath, abdominal pain, urinary sx, bowel changes. Past medical, surgical, and social history reviewed and updated  Medications and allergies reviewed and updated      O: /78 (Site: Left Upper Arm, Position: Sitting, Cuff Size: Medium Adult)   Pulse 97   Temp 97.5 °F (36.4 °C)   Wt 178 lb 6.4 oz (80.9 kg)   SpO2 97%   BMI 33.71 kg/m²   GEN: No acute distress, cooperative, well nourished, alert. HEENT: PEERLA, EOMI , normocephalic/atraumatic, nares and oropharynx clear. Mucous membranes normal, Tympanic membranes clear bilaterally. Mild nasal congestion bilaterally, mild postnasal drip in posterior oropharynx  no sinus tender to palpation   Neck: soft, supple, no thyromegaly, mass, no Lymphadenopathy  CV: Regular rate and rhythm, no murmur, rubs, gallops. No edema. Resp: Clear to auscultation bilaterally good air entry bilaterally  No crackles, wheeze. Breathing comfortably.    Psych: mood stable, No suicidal thoughts or ideation       Current Outpatient Medications   Medication Sig Dispense Refill    HYDROcodone-chlorpheniramine (TUSSIONEX PENNKINETIC ER) 10-8 MG/5ML SUER Take 5 mLs by mouth every 12 hours as needed (cough) for up to 7 days. 60 mL 0    doxycycline hyclate (VIBRA-TABS) 100 MG tablet Take 1 tablet by mouth 2 times daily for 10 days 20 tablet 0    diazePAM (VALIUM) 5 MG tablet Take 1 tablet by mouth every 8 hours as needed for Anxiety or Sleep for up to 1 dose. Take 1 dose 30 minutes prior to procedure. Do not drive. 1 tablet 0    methylphenidate (CONCERTA) 36 MG extended release tablet Take 1 tablet by mouth every morning for 30 days. 30 tablet 0    gabapentin (NEURONTIN) 300 MG capsule Take 1 capsule by mouth 3 times daily for 180 days. Intended supply: 30 days 90 capsule 5    methylphenidate (CONCERTA) 36 MG extended release tablet Take 1 tablet by mouth every morning for 30 days. 30 tablet 0    pantoprazole (PROTONIX) 40 MG tablet TAKE 1 TABLET BY MOUTH EVERY MORNING BEFORE BREAKFAST 30 tablet 5    naproxen (NAPROSYN) 500 MG tablet Take 1 tablet by mouth 2 times daily (with meals) (Patient not taking: Reported on 9/19/2022) 60 tablet 1    albuterol sulfate HFA (PROVENTIL HFA) 108 (90 Base) MCG/ACT inhaler Inhale 2 puffs into the lungs every 6 hours as needed for Wheezing 1 each 5    ciprofloxacin (CIPRO) 500 MG tablet Take 1 tablet by mouth daily as needed (sexual activity) 30 tablet 2    desvenlafaxine succinate (PRISTIQ) 50 MG TB24 extended release tablet Take 1 tablet by mouth daily 30 tablet 5    hydrOXYzine (ATARAX) 25 MG tablet Take 1 tablet by mouth every 8 hours as needed for Anxiety 30 tablet 5     No current facility-administered medications for this visit. ASSESSMENT / PLAN:    1. Acute URI  C/w viral URI  COVID testing negative  Cont over the counter/symptomatic treatment. Follow up for persistent symptoms in 7 to 10 days or sooner for worsening symptomatology   Rx given for doxy for persistent or increased sx in the next few days  - HYDROcodone-chlorpheniramine (TUSSIONEX PENNKINETIC ER) 10-8 MG/5ML SUER; Take 5 mLs by mouth every 12 hours as needed (cough) for up to 7 days.   Dispense: 60 mL;

## 2022-09-30 ENCOUNTER — HOSPITAL ENCOUNTER (OUTPATIENT)
Dept: GENERAL RADIOLOGY | Age: 57
Discharge: HOME | End: 2022-09-30
Payer: COMMERCIAL

## 2022-09-30 ENCOUNTER — HOSPITAL ENCOUNTER (OUTPATIENT)
Dept: MRI IMAGING | Age: 57
Discharge: HOME | End: 2022-09-30
Payer: COMMERCIAL

## 2022-09-30 DIAGNOSIS — M25.559 HIP PAIN: ICD-10-CM

## 2022-09-30 DIAGNOSIS — Z98.890 STATUS POST HIP SURGERY: ICD-10-CM

## 2022-09-30 DIAGNOSIS — S73.191A TEAR OF RIGHT ACETABULAR LABRUM, INITIAL ENCOUNTER: ICD-10-CM

## 2022-09-30 PROCEDURE — A9579 GAD-BASE MR CONTRAST NOS,1ML: HCPCS

## 2022-09-30 PROCEDURE — 73525 CONTRAST X-RAY OF HIP: CPT

## 2022-09-30 PROCEDURE — 6360000004 HC RX CONTRAST MEDICATION

## 2022-09-30 PROCEDURE — A4216 STERILE WATER/SALINE, 10 ML: HCPCS

## 2022-09-30 PROCEDURE — 73722 MRI JOINT OF LWR EXTR W/DYE: CPT

## 2022-09-30 PROCEDURE — 2500000003 HC RX 250 WO HCPCS

## 2022-09-30 PROCEDURE — 2580000003 HC RX 258

## 2022-09-30 RX ORDER — SODIUM CHLORIDE 9 MG/ML
INJECTION INTRAVENOUS
Status: COMPLETED
Start: 2022-09-30 | End: 2022-09-30

## 2022-09-30 RX ORDER — LIDOCAINE HYDROCHLORIDE 10 MG/ML
INJECTION, SOLUTION EPIDURAL; INFILTRATION; INTRACAUDAL; PERINEURAL
Status: COMPLETED
Start: 2022-09-30 | End: 2022-09-30

## 2022-09-30 RX ORDER — SODIUM CHLORIDE 9 MG/ML
INJECTION INTRAVENOUS
Status: DISPENSED
Start: 2022-09-30 | End: 2022-09-30

## 2022-09-30 RX ADMIN — IOPAMIDOL 2 ML: 408 INJECTION, SOLUTION INTRATHECAL at 10:13

## 2022-09-30 RX ADMIN — LIDOCAINE HYDROCHLORIDE 10 ML: 10 INJECTION, SOLUTION EPIDURAL; INFILTRATION; INTRACAUDAL; PERINEURAL at 10:10

## 2022-09-30 RX ADMIN — SODIUM CHLORIDE 10 ML: 9 INJECTION, SOLUTION INTRAMUSCULAR; INTRAVENOUS; SUBCUTANEOUS at 10:15

## 2022-09-30 RX ADMIN — IOPAMIDOL 10 ML: 408 INJECTION, SOLUTION INTRATHECAL at 10:15

## 2022-09-30 RX ADMIN — GADOTERIDOL 0.2 ML: 279.3 INJECTION, SOLUTION INTRAVENOUS at 10:10

## 2022-10-06 ENCOUNTER — OFFICE VISIT (OUTPATIENT)
Dept: ORTHOPEDIC SURGERY | Age: 57
End: 2022-10-06
Payer: COMMERCIAL

## 2022-10-06 VITALS — HEIGHT: 61 IN | WEIGHT: 179 LBS | BODY MASS INDEX: 33.79 KG/M2

## 2022-10-06 DIAGNOSIS — Z98.890 STATUS POST HIP SURGERY: Primary | ICD-10-CM

## 2022-10-06 DIAGNOSIS — S73.191D TEAR OF RIGHT ACETABULAR LABRUM, SUBSEQUENT ENCOUNTER: ICD-10-CM

## 2022-10-06 DIAGNOSIS — M16.11 PRIMARY OSTEOARTHRITIS OF RIGHT HIP: ICD-10-CM

## 2022-10-06 PROCEDURE — 99214 OFFICE O/P EST MOD 30 MIN: CPT | Performed by: ORTHOPAEDIC SURGERY

## 2022-10-06 NOTE — PROGRESS NOTES
Chief Complaint  Results (RESULTS MRI RIGHT HIP)      History of Present Illness:  Virginie Maxwell is a pleasant 61 y.o. female is 1.5  year post right hip arthroscopy pincer decompression and labral repair. She recently had an MRI of the right hip and here to review MRI result. She has been doing great. However in the past 2 months  has been having worsening lateral sided hip pain and recurrent weather related changes/stiffness and groin pain, with no inciting incident. She believes its been due to heavy lifting at work. She works as an [employer]. She otherwise feels her hip is at an acceptable symptomatic state. No numbness or tingling. No bowel or bladder symptoms. Medical History:  Patient's medications, allergies, past medical, surgical, social and family histories were reviewed and updated as appropriate. Pain Assessment  Location of Pain: Hip  Location Modifiers: Right  Severity of Pain: 10  Quality of Pain: Sharp  Frequency of Pain: Intermittent  Aggravating Factors:  (EXTENDED SITTING. TWISTING)  Limiting Behavior: Some  Result of Injury: No  Work-Related Injury: No  Are there other pain locations you wish to document?: No  ROS: Review of systems reviewed from Patient History Form completed today and available in the patient's chart under the Media tab. Pertinent items are noted in HPI  Review of systems reviewed from Patient History Form completed today and available in the patient's chart under the Media tab. Vital Signs:  Ht 5' 1\" (1.549 m)   Wt 179 lb (81.2 kg)   BMI 33.82 kg/m²         Neuro: Alert & oriented x 3,  normal,  no focal deficits noted. Normal affect. Eyes: sclera clear  Ears: Normal external ear  Mouth:  No perioral lesions  Pulm: Respirations unlabored and regular  Pulse: Extremities well perfused. 2+ peripheral pulses. Skin: Warm. No ulcerations. Constitutional: The physical examination finds the patient to be well-developed and well-nourished. The patient is alert and oriented x3 and was cooperative throughout the visit. Hip Examination: right    Skin/Inspection: No skin lesions, cellulitis, or extreme edema in the lower extremities. Standing/Walking: normal gait, negative Trendelenburg sign. Supine/Side Lying Exam: Non tender around the major bony prominences  full range of motion  FADIR positive  CIARA Negative  Resisted Abduction 5/5   Resisted Adduction 5/5   Resisted  Flexion 5/5  severe tender at greater trochanters bilaterally    Distal Neurovascular exam is intact (foot sensation, pulses, and motor exam)       Diagnostics:  New AP pelvis/keyes views of the right hip show. Decompressed pincer, minimal joint space narrowing, tonnis 1, no cysts. No acute findings. MRI:  1. Mild right hip osteoarthrosis. 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 2. No acute osseous abnormality. No right femoral head AVN. 3. Degenerative tearing and volume loss of the right acetabular labrum. 4. Intermediate grade partial-thickness tearing of the right-sided hamstring   tendons from the right ischial tuberosity. 5. Moderate sigmoid colonic diverticulosis. Mild stool burden. Assessment: Patient is a 61 y.o. female who is 1.5 year post right hip arthroscopy for pincer FORTINO and labral repair. She has made terrific functional gains but is now having signs and symptoms of bilateral greater trochanteric pain syndrome for the past 2 months but she reports overall symptoms much better compared to prior to her hip scope. MRI showed mild to moderate right hip chondromalacia which is likely the main contributor to her current symptoms, and is a natural progression to wide zone early cartilage chondromalacia identified at the time of surgery.     Impression:  Visit Diagnoses         Codes    Status post hip surgery    -  Primary Z98.890    Tear of right acetabular labrum, subsequent encounter     S73.191D    Primary osteoarthritis of right hip M16.11              Office Procedures:  No orders of the defined types were placed in this encounter. No orders of the defined types were placed in this encounter. Plan:  Pertinent imaging was reviewed. The etiology, natural history, and treatment options for the disorder were discussed. The roles of activity modification, antiinflammatory medicine, injections, bracing, physical therapy, and surgical interventions were all described to the patient and questions were answered. We believe Rosalinda Espinosa is a candidate for scheduled image guided right hip intra-articular PRP injections as a cartilage nutrition, hip joint maintenance therapy. all the patient's questions were answered while in the clinic. The patient is understanding of all instructions and agrees with the plan. Approximately 30 minutes was spent on patient education and coordinating care. Follow up in: No follow-ups on file. 1 year or sooner for PRP    Sincerely,    Damian Quinones MD [address] [place] [address] and [address] [place] [address]  Email: [email or web address]  Office: [phone]    10/08/22  10:21 PM    The encounter with Rosalinda sEpinosa was carried out by myself, Dr Kinza Gonzalez, who personally examined the patient and reviewed the plan. This dictation was performed with a verbal recognition program (DRAGON) and it was checked for errors. It is possible that there are still dictated errors within this office note. If so, please bring any errors to my attention for an addendum. All efforts were made to ensure that this office note is accurate.

## 2022-10-06 NOTE — LETTER
[facility] JOSE CARLOS IRIZARRY  [address] [id number]  Phone: [phone]  Fax: [phone]    Mounika Hernandez MD    October 8, 2022     Marshall Muse MD  [address] [place] [id number]    Patient: Jesse Weir   MR Number: [id number]   YOB: 1965   Date of Visit: 10/6/2022       Dear Marshall Muse: Thank you for referring Madiha Godwin to me for evaluation/treatment. Below are the relevant portions of my assessment and plan of care. If you have questions, please do not hesitate to call me. I look forward to following ALYSON along with you.     Sincerely,      Mounika Hernandez MD

## 2022-10-17 ENCOUNTER — HOSPITAL ENCOUNTER (OUTPATIENT)
Dept: GENERAL RADIOLOGY | Age: 57
Discharge: HOME | End: 2022-10-17
Payer: COMMERCIAL

## 2022-10-17 ENCOUNTER — OFFICE VISIT (OUTPATIENT)
Dept: FAMILY MEDICINE CLINIC | Age: 57
End: 2022-10-17
Payer: COMMERCIAL

## 2022-10-17 VITALS
OXYGEN SATURATION: 98 % | BODY MASS INDEX: 34.01 KG/M2 | DIASTOLIC BLOOD PRESSURE: 78 MMHG | RESPIRATION RATE: 12 BRPM | HEART RATE: 100 BPM | TEMPERATURE: 97.8 F | WEIGHT: 180 LBS | SYSTOLIC BLOOD PRESSURE: 126 MMHG

## 2022-10-17 DIAGNOSIS — F90.0 ATTENTION DEFICIT HYPERACTIVITY DISORDER (ADHD), PREDOMINANTLY INATTENTIVE TYPE: ICD-10-CM

## 2022-10-17 DIAGNOSIS — F33.0 MAJOR DEPRESSIVE DISORDER, RECURRENT EPISODE, MILD (HCC): ICD-10-CM

## 2022-10-17 DIAGNOSIS — Z12.31 ENCOUNTER FOR SCREENING MAMMOGRAM FOR MALIGNANT NEOPLASM OF BREAST: ICD-10-CM

## 2022-10-17 DIAGNOSIS — M54.9 MID BACK PAIN: ICD-10-CM

## 2022-10-17 DIAGNOSIS — G62.9 NEUROPATHY: ICD-10-CM

## 2022-10-17 DIAGNOSIS — M79.18 MYOFASCIAL PAIN: Primary | ICD-10-CM

## 2022-10-17 PROCEDURE — 99214 OFFICE O/P EST MOD 30 MIN: CPT | Performed by: FAMILY MEDICINE

## 2022-10-17 PROCEDURE — 72072 X-RAY EXAM THORAC SPINE 3VWS: CPT

## 2022-10-17 NOTE — PROGRESS NOTES
Here for f/u hip pain, ADD, mood, hip pain    Pt states that things are doing well, staying healthy. Pt states that work is doing well, did get a promotion and does find that it doing well    Pt is concerned about some persistent issues of joint pain, arthralgias. Pt does get some fatigue issues as well. Pt does take gabapentin TID (usually at least BID and sometimes TID) and does seem to be helping but at times can make her sleepy. Pt does ok during work, not too groggy. Pt does find that weather changes do exacerbate her symptoms. Does have a very localized pain in midthoracic back area. Here for follow up of ADD. Pt states that they are doing very well with current therapy and feels that control of symptoms are adequate at this time. No breakthru symptoms and no need/indication for adjustment in therapy. Taking meds as prescribed and denies any illicit medication usage of misuse of their stimulant thearpy. Mood is stable and denies any issues of depression or anxiety associated with treatment of ADD. Except as noted above in the history of present illness, the review of systems is  negative for headache, vision changes, chest pain, shortness of breath, abdominal pain, urinary sx, bowel changes. Past medical, surgical, and social history reviewed and updated  Medications and allergies reviewed and updated        O: /78   Pulse 100   Temp 97.8 °F (36.6 °C) (Temporal)   Resp 12   Wt 180 lb (81.6 kg)   SpO2 98%   BMI 34.01 kg/m²   GEN: No acute distress, cooperative, well nourished, alert. HEENT: PEERLA, EOMI , normocephalic/atraumatic, nares and oropharynx clear. Mucous membranes normal, Tympanic membranes clear bilaterally. Neck: soft, supple, no thyromegaly, mass, no Lymphadenopathy  CV: Regular rate and rhythm, no murmur, rubs, gallops. No edema. Resp: Clear to auscultation bilaterally good air entry bilaterally  No crackles, wheeze. Breathing comfortably.    Psych: mood stable, No suicidal thoughts or ideation       Current Outpatient Medications   Medication Sig Dispense Refill    gabapentin (NEURONTIN) 300 MG capsule Take 1 capsule by mouth 3 times daily for 180 days. Intended supply: 30 days 90 capsule 5    pantoprazole (PROTONIX) 40 MG tablet TAKE 1 TABLET BY MOUTH EVERY MORNING BEFORE BREAKFAST 30 tablet 5    naproxen (NAPROSYN) 500 MG tablet Take 1 tablet by mouth 2 times daily (with meals) 60 tablet 1    albuterol sulfate HFA (PROVENTIL HFA) 108 (90 Base) MCG/ACT inhaler Inhale 2 puffs into the lungs every 6 hours as needed for Wheezing 1 each 5    ciprofloxacin (CIPRO) 500 MG tablet Take 1 tablet by mouth daily as needed (sexual activity) 30 tablet 2    desvenlafaxine succinate (PRISTIQ) 50 MG TB24 extended release tablet Take 1 tablet by mouth daily 30 tablet 5    hydrOXYzine (ATARAX) 25 MG tablet Take 1 tablet by mouth every 8 hours as needed for Anxiety 30 tablet 5     No current facility-administered medications for this visit. ASSESSMENT / PLAN:    1. Myofascial pain  Stable with gabapentin  Reviewed negative bloodwork for underlying RA, CTD  Cont supportive therapy  Consider rheum eval for persistent sx severity    2. Major depressive disorder, recurrent episode, mild (HCC)  Mood stable, doing well    3. Attention deficit hyperactivity disorder (ADHD), predominantly inattentive type  Doing ok with concernta, with some breakthru sx  Discussed tx options. Trial change in meds to vyvanse 30mg qd  Discussed use, benefit, and side effects of prescribed medications. Barriers to medication compliance addressed. All patient questions answered. Pt voiced understanding. F/u 1 month  Rx sent to pharmacy  - lisdexamfetamine (VYVANSE) 30 MG capsule; Take 1 capsule by mouth every morning for 30 days. Dispense: 30 capsule; Refill: 0    4. Mid back pain  Suspect could be related to myofascial pain  Check xray  Management pending results.    - XR THORACIC SPINE (2 VIEWS); Future    5. Neuropathy  Cont gabapntin           Follow-up appointment:   1 month/prn     Discussed use, benefit, and side effects of all prescribed medications. Barriers to medication compliance addressed. All patient questions answered. Pt voiced understanding. When applicable, patient's outside records were reviewed through [facility]. The patient has signed appropriate paperworks/consents.

## 2022-10-18 RX ORDER — HYDROXYZINE HYDROCHLORIDE 25 MG/1
25 TABLET, FILM COATED ORAL EVERY 8 HOURS PRN
Qty: 30 TABLET | Refills: 5 | Status: SHIPPED | OUTPATIENT
Start: 2022-10-18

## 2022-11-07 ENCOUNTER — OFFICE VISIT (OUTPATIENT)
Dept: FAMILY MEDICINE CLINIC | Age: 57
End: 2022-11-07
Payer: COMMERCIAL

## 2022-11-07 VITALS
HEART RATE: 105 BPM | RESPIRATION RATE: 12 BRPM | BODY MASS INDEX: 33.67 KG/M2 | OXYGEN SATURATION: 95 % | WEIGHT: 178.2 LBS | TEMPERATURE: 97.8 F | DIASTOLIC BLOOD PRESSURE: 60 MMHG | SYSTOLIC BLOOD PRESSURE: 110 MMHG

## 2022-11-07 DIAGNOSIS — J06.9 ACUTE URI: Primary | ICD-10-CM

## 2022-11-07 DIAGNOSIS — R05.1 ACUTE COUGH: ICD-10-CM

## 2022-11-07 DIAGNOSIS — H92.01 OTALGIA, RIGHT: ICD-10-CM

## 2022-11-07 DIAGNOSIS — H61.23 BILATERAL IMPACTED CERUMEN: ICD-10-CM

## 2022-11-07 PROCEDURE — 99214 OFFICE O/P EST MOD 30 MIN: CPT | Performed by: FAMILY MEDICINE

## 2022-11-07 PROCEDURE — 69210 REMOVE IMPACTED EAR WAX UNI: CPT | Performed by: FAMILY MEDICINE

## 2022-11-07 RX ORDER — HYDROCODONE POLISTIREX AND CHLORPHENIRAMINE POLISTIREX 10; 8 MG/5ML; MG/5ML
5 SUSPENSION, EXTENDED RELEASE ORAL EVERY 12 HOURS PRN
Qty: 115 ML | Refills: 0 | Status: SHIPPED | OUTPATIENT
Start: 2022-11-07 | End: 2022-11-14

## 2022-11-07 ASSESSMENT — PATIENT HEALTH QUESTIONNAIRE - PHQ9
10. IF YOU CHECKED OFF ANY PROBLEMS, HOW DIFFICULT HAVE THESE PROBLEMS MADE IT FOR YOU TO DO YOUR WORK, TAKE CARE OF THINGS AT HOME, OR GET ALONG WITH OTHER PEOPLE: 0
8. MOVING OR SPEAKING SO SLOWLY THAT OTHER PEOPLE COULD HAVE NOTICED. OR THE OPPOSITE, BEING SO FIGETY OR RESTLESS THAT YOU HAVE BEEN MOVING AROUND A LOT MORE THAN USUAL: 0
6. FEELING BAD ABOUT YOURSELF - OR THAT YOU ARE A FAILURE OR HAVE LET YOURSELF OR YOUR FAMILY DOWN: 0
7. TROUBLE CONCENTRATING ON THINGS, SUCH AS READING THE NEWSPAPER OR WATCHING TELEVISION: 0
4. FEELING TIRED OR HAVING LITTLE ENERGY: 0
SUM OF ALL RESPONSES TO PHQ QUESTIONS 1-9: 0
SUM OF ALL RESPONSES TO PHQ QUESTIONS 1-9: 0
SUM OF ALL RESPONSES TO PHQ9 QUESTIONS 1 & 2: 0
3. TROUBLE FALLING OR STAYING ASLEEP: 0
2. FEELING DOWN, DEPRESSED OR HOPELESS: 0
9. THOUGHTS THAT YOU WOULD BE BETTER OFF DEAD, OR OF HURTING YOURSELF: 0
SUM OF ALL RESPONSES TO PHQ QUESTIONS 1-9: 0
SUM OF ALL RESPONSES TO PHQ QUESTIONS 1-9: 0
5. POOR APPETITE OR OVEREATING: 0
1. LITTLE INTEREST OR PLEASURE IN DOING THINGS: 0

## 2022-11-07 NOTE — PROGRESS NOTES
Here for eval of URI sx, pt states that sx have been present for a few days with 3-4d of sx, with some exposure to RSV at work and other viral issues. Pt with some clogging to R ear and some pain, with mild pain to L side. No fever, chills but some sinus congestion and hoarseness. Ear clogging has been a few days before URI sx. Tried ear wax softener. No significant chest congestion and some mild cough. Pt did try some mucinex. Pt denies any shortness of breath, no chest pain    Pt states that otherwise things are doing well,     Except as noted above in the history of present illness, the review of systems is  negative for headache, vision changes, chest pain, shortness of breath, abdominal pain, urinary sx, bowel changes. Past medical, surgical, and social history reviewed and updated  Medications and allergies reviewed and updated      O: /60   Pulse (!) 105   Temp 97.8 °F (36.6 °C) (Temporal)   Resp 12   Wt 178 lb 3.2 oz (80.8 kg)   SpO2 95%   BMI 33.67 kg/m²   GEN: No acute distress, cooperative, well nourished, alert. HEENT: PEERLA, EOMI , normocephalic/atraumatic, nares and oropharynx clear. Mucous membranes normal, Tympanic membranes occluded by cerumen bilaterally R >L, after irrigation, tympanic membrane clear bilaterally  Neck: soft, supple, no thyromegaly, mass, no Lymphadenopathy  CV: Regular rate and rhythm, no murmur, rubs, gallops. No edema. Resp: Clear to auscultation bilaterally good air entry bilaterally  No crackles, wheeze. Breathing comfortably. Psych: mood stable, No suicidal thoughts or ideation        Current Outpatient Medications   Medication Sig Dispense Refill    HYDROcodone-chlorpheniramine (TUSSIONEX PENNKINETIC ER) 10-8 MG/5ML SUER Take 5 mLs by mouth every 12 hours as needed (cough) for up to 7 days.  115 mL 0    hydrOXYzine HCl (ATARAX) 25 MG tablet Take 1 tablet by mouth every 8 hours as needed for Anxiety 30 tablet 5    lisdexamfetamine (VYVANSE) 30 MG capsule Take 1 capsule by mouth every morning for 30 days. 30 capsule 0    gabapentin (NEURONTIN) 300 MG capsule Take 1 capsule by mouth 3 times daily for 180 days. Intended supply: 30 days 90 capsule 5    pantoprazole (PROTONIX) 40 MG tablet TAKE 1 TABLET BY MOUTH EVERY MORNING BEFORE BREAKFAST 30 tablet 5    naproxen (NAPROSYN) 500 MG tablet Take 1 tablet by mouth 2 times daily (with meals) 60 tablet 1    albuterol sulfate HFA (PROVENTIL HFA) 108 (90 Base) MCG/ACT inhaler Inhale 2 puffs into the lungs every 6 hours as needed for Wheezing 1 each 5    ciprofloxacin (CIPRO) 500 MG tablet Take 1 tablet by mouth daily as needed (sexual activity) 30 tablet 2    desvenlafaxine succinate (PRISTIQ) 50 MG TB24 extended release tablet Take 1 tablet by mouth daily 30 tablet 5     No current facility-administered medications for this visit. ASSESSMENT / PLAN:    1. Acute URI  C/w viral URI  ? RSV d/t exposure hx  Mild sx  Cont over the counter/symptomatic treatment. Follow up for persistent symptoms in 7 to 10 days or sooner for worsening symptomatology   Tussionex 1 tsp po BID prn cough  - HYDROcodone-chlorpheniramine (TUSSIONEX PENNKINETIC ER) 10-8 MG/5ML SUER; Take 5 mLs by mouth every 12 hours as needed (cough) for up to 7 days. Dispense: 115 mL; Refill: 0    2. Otalgia, right  As above  Cerumen irrigated with normal tympanic membrane  Sx subjectively improved  - OR REMOVAL IMPACTED CERUMEN INSTRUMENTATION UNILAT    3. Bilateral impacted cerumen  Ceruminosis is noted, impairing the examination of significant parts of the External auditory canals, tympanic membrane, or middle ear. Wax is removed by syringing and manual debridement, performed by physician. Instruments used consisted of cup forceps, wax curettes. Over 15 minutes were spent with patient cleaning out canal adequately. Instructions for home care to prevent wax buildup are given. - OR REMOVAL IMPACTED CERUMEN INSTRUMENTATION UNILAT    4.  Acute cough  D/t above  Pulm exam normal  Monitor with over the counter   Tussionex as below  - HYDROcodone-chlorpheniramine (TUSSIONEX PENNKINETIC ER) 10-8 MG/5ML SUER; Take 5 mLs by mouth every 12 hours as needed (cough) for up to 7 days. Dispense: 115 mL; Refill: 0           Follow-up appointment:   Call or return to clinic prn if these symptoms worsen or fail to improve as anticipated. Discussed use, benefit, and side effects of all prescribed medications. Barriers to medication compliance addressed. All patient questions answered. Pt voiced understanding. When applicable, patient's outside records were reviewed through [facility]. The patient has signed appropriate paperworks/consents.

## 2022-11-14 ENCOUNTER — OFFICE VISIT (OUTPATIENT)
Dept: FAMILY MEDICINE CLINIC | Age: 57
End: 2022-11-14
Payer: COMMERCIAL

## 2022-11-14 VITALS
RESPIRATION RATE: 12 BRPM | WEIGHT: 180 LBS | HEIGHT: 61 IN | SYSTOLIC BLOOD PRESSURE: 120 MMHG | TEMPERATURE: 98 F | DIASTOLIC BLOOD PRESSURE: 70 MMHG | BODY MASS INDEX: 33.99 KG/M2 | HEART RATE: 95 BPM | OXYGEN SATURATION: 96 %

## 2022-11-14 DIAGNOSIS — M18.11 DEGENERATIVE ARTHRITIS OF THUMB, RIGHT: ICD-10-CM

## 2022-11-14 DIAGNOSIS — M79.641 CHRONIC HAND PAIN, RIGHT: ICD-10-CM

## 2022-11-14 DIAGNOSIS — Z12.31 ENCOUNTER FOR SCREENING MAMMOGRAM FOR MALIGNANT NEOPLASM OF BREAST: ICD-10-CM

## 2022-11-14 DIAGNOSIS — Z00.00 ROUTINE GENERAL MEDICAL EXAMINATION AT A HEALTH CARE FACILITY: Primary | ICD-10-CM

## 2022-11-14 DIAGNOSIS — R73.9 HYPERGLYCEMIA: ICD-10-CM

## 2022-11-14 DIAGNOSIS — G89.29 CHRONIC HAND PAIN, RIGHT: ICD-10-CM

## 2022-11-14 DIAGNOSIS — F33.0 MAJOR DEPRESSIVE DISORDER, RECURRENT EPISODE, MILD (HCC): ICD-10-CM

## 2022-11-14 DIAGNOSIS — F90.0 ATTENTION DEFICIT HYPERACTIVITY DISORDER (ADHD), PREDOMINANTLY INATTENTIVE TYPE: ICD-10-CM

## 2022-11-14 DIAGNOSIS — M79.18 MYOFASCIAL PAIN: ICD-10-CM

## 2022-11-14 PROCEDURE — 99396 PREV VISIT EST AGE 40-64: CPT | Performed by: FAMILY MEDICINE

## 2022-11-14 NOTE — PROGRESS NOTES
Here for well checkup, physical.  Pt states that overall things are doing well. Pt did get back started on vyvanse and the first dose had some mild side effects but now is doing ok. Pt is happy to stick with it at this time, and feels that it is doing well    Here for follow up of ADD. Pt states that they are doing very well with current therapy and feels that control of symptoms are adequate at this time. No breakthru symptoms and no need/indication for adjustment in therapy. Taking meds as prescribed and denies any illicit medication usage of misuse of their stimulant thearpy. Mood is stable and denies any issues of depression or anxiety associated with treatment of ADD. Pt is feeling well otherwise, denies any is chest pain, shortness of breath. Pt feels that with use of the gabapentin, things are stable. Pt happy with the dosing of the med, and allows her to be functional.  Pt does have to watch when she takes as it can make her sleepy but it is getting better    Pt working with ortho for R hand pain and was dx with degenerative osteoarthritis and was told to treat with bracing, and range of motion exercises and physical therapy       Except as noted in the history of present illness as above, the review of systems is negative for the following:    General ROS: negative  Psychological ROS: negative  Allergy and Immunology ROS: negative  Hematological and Lymphatic ROS: negative  Respiratory ROS: no cough, shortness of breath, or wheezing  Cardiovascular ROS: no chest pain or dyspnea on exertion  Gastrointestinal ROS: no abdominal pain, change in bowel habits, or black or bloody stools  Genito-Urinary ROS: no dysuria, trouble voiding, or hematuria  Musculoskeletal ROS: negative  Dermatological ROS: negative      Past medical, surgical, and social history reviewed and updated.    Medications and allergies reviewed and updated      /70   Pulse 95   Temp 98 °F (36.7 °C) (Temporal)   Resp 12   Ht 5' 1\" (1.549 m)   Wt 180 lb (81.6 kg)   SpO2 96%   BMI 34.01 kg/m²   General appearance - healthy, alert, no distress  Skin - Skin color, texture, turgor normal. No rashes or lesions. No suspicious findings  Head - Normocephalic. No masses, lesions, tenderness or abnormalities  Eyes - conjunctivae/corneas clear. Pupils equal and reactive to light and accomodation, extraocular muscles intact. Ears - External ears normal. Canals clear. Tympanic membranes normal bilaterally. Nose/Sinuses - Nares normal. Septum midline. Mucosa normal. No drainage or sinus tenderness. Oropharynx - Lips, mucosa, and tongue normal. Teeth and gums normal.   Neck - Neck supple. No adenopathy. Thyroid symmetric, normal size, no carotid bruit bilaterally  Back - Back symmetric, no curvature. Range of motion normal. No Costovertebral angle tenderness. Lungs - Percussion normal. Good diaphragmatic excursion. Lungs clear without wheeze, rales, crackles  Heart - Regular rate and rhythm, with no rub, murmur or gallop noted. Abdomen - Abdomen soft, non-tender. Bowel sounds normal. No masses, tenderness or organomegaly  Extremities - Extremities normal. No deformities, edema, or skin discoloration  Musculoskeletal - Spine ROM normal. Muscular strength intact. Peripheral pulses - radial=4/4,, femoral=4/4, popliteal=4/4, dorsalis pedis=4/4,  Neuro - Gait normal. Reflexes normal and symmetric. Sensation grossly normal.  No focal weakness  Psych - euthymic, no suicidal thoughts or ideation, mood stable. ASSESSMENT / PLAN:    1. Routine general medical examination at a health care facility  No focal abnormalities on exam  Anticipatory guidance discussed.     2. Attention deficit hyperactivity disorder (ADHD), predominantly inattentive type  Stable with change of med to vyvanse  refills given for 3 months  Pt aware of need for every 3 month medication followup appointments, and that medication refills for benzodiazepines, narcotics and/or stimulants will only be given at appointment. - lisdexamfetamine (VYVANSE) 30 MG capsule; Take 1 capsule by mouth every morning for 90 days. Dispense: 90 capsule; Refill: 0    3. Hyperglycemia  At risk for diabetes mellitus  Will recheck bloodwork in 3 months    4. Major depressive disorder, recurrent episode, mild (HCC)  Mood stable, doing well with pristiq  chondromalacia patella     5. Myofascial pain  Stable with gabapentin 300mg BID    6. Degenerative arthritis of thumb, right  F/u with ortho  Margie cordero    7. Chronic hand pain, right  F/u with dr. Clifton Funez    8. Encounter for screening mammogram for malignant neoplasm of breast  Due mammo  Agreeable to set up           Follow-up appointment:   3 months  Prn     Discussed use, benefit, and side effects of all prescribed medications. Barriers to medication compliance addressed. All patient questions answered. Pt voiced understanding. When applicable, patient's outside records were reviewed through [facility]. The patient has signed appropriate paperworks/consents.

## 2022-11-22 ENCOUNTER — TELEPHONE (OUTPATIENT)
Dept: FAMILY MEDICINE CLINIC | Age: 57
End: 2022-11-22

## 2022-11-22 NOTE — TELEPHONE ENCOUNTER
Patient is still having cough and congestion. She is concerned about rsv. She is saying she is getting a message of no e-visits for the next 2 days. No shortness of breath and no fever. She has tried Mucinex with no relief. Please call to advise. Ok to leave a message.      Thank you

## 2022-11-22 NOTE — TELEPHONE ENCOUNTER
----- Message from Jose Guadalupe Hensley sent at 11/22/2022  8:12 AM EST -----  Subject: Message to Provider    QUESTIONS  Information for Provider? Pt said she saw  last week and was told to   reach out if it did not get better. Pt was told she could get something   called in for it. Pt said she is still having the symptoms and would like   an antibiotic called in for her. Please reach out and let her know when it   is being sent. Please send it to [facility].  ---------------------------------------------------------------------------  --------------  Lula DANIEL  [phone]; OK to leave message on voicemail  ---------------------------------------------------------------------------  --------------  SCRIPT ANSWERS  Relationship to Patient?  Self

## 2022-11-23 RX ORDER — DOXYCYCLINE HYCLATE 100 MG
100 TABLET ORAL 2 TIMES DAILY
Qty: 20 TABLET | Refills: 0 | Status: SHIPPED | OUTPATIENT
Start: 2022-11-23 | End: 2022-12-03

## 2022-12-04 RX ORDER — PANTOPRAZOLE SODIUM 40 MG/1
TABLET, DELAYED RELEASE ORAL
Qty: 30 TABLET | Refills: 5 | Status: SHIPPED | OUTPATIENT
Start: 2022-12-04

## 2022-12-05 ENCOUNTER — TELEPHONE (OUTPATIENT)
Dept: FAMILY MEDICINE CLINIC | Age: 57
End: 2022-12-05

## 2022-12-05 NOTE — TELEPHONE ENCOUNTER
Patient ear is clogged again still causing slight dizziness and pain. Patient made appt with Dr. Sheba Ribera tomorrow since Dr. Geraldo Roberto had no openings. She still would like to know what he suggests she try. OTC meds have not worked.     [phone] (home) [phone] (work)

## 2022-12-06 ENCOUNTER — OFFICE VISIT (OUTPATIENT)
Dept: FAMILY MEDICINE CLINIC | Age: 57
End: 2022-12-06
Payer: COMMERCIAL

## 2022-12-06 VITALS
WEIGHT: 181.4 LBS | SYSTOLIC BLOOD PRESSURE: 128 MMHG | BODY MASS INDEX: 34.28 KG/M2 | TEMPERATURE: 97.5 F | HEART RATE: 94 BPM | RESPIRATION RATE: 14 BRPM | DIASTOLIC BLOOD PRESSURE: 68 MMHG | OXYGEN SATURATION: 96 %

## 2022-12-06 DIAGNOSIS — H92.01 RIGHT EAR PAIN: ICD-10-CM

## 2022-12-06 DIAGNOSIS — J06.9 ACUTE URI: ICD-10-CM

## 2022-12-06 DIAGNOSIS — H69.81 ETD (EUSTACHIAN TUBE DYSFUNCTION), RIGHT: Primary | ICD-10-CM

## 2022-12-06 PROCEDURE — 99214 OFFICE O/P EST MOD 30 MIN: CPT | Performed by: FAMILY MEDICINE

## 2022-12-06 RX ORDER — METHYLPREDNISOLONE 4 MG/1
TABLET ORAL
Qty: 21 TABLET | Refills: 0 | Status: SHIPPED | OUTPATIENT
Start: 2022-12-06

## 2022-12-06 NOTE — PROGRESS NOTES
Here for f/u of URI sx, with some persistent R ear sx. Pt has had sx for about 6 weeks and was seen 11/2022 and was treated for viral URI, and had ears cleaned. Due to persistent sx, pt was given doxy 100mg BID x 10d. Pt feels better but with some persistent nasal congestion and ear discomfort on R. No discharge or drainage. Pt felt fullness in ear and heart heartbeat. Pt does have hx of recurrent OM in the past when she was a kid. Except as noted above in the history of present illness, the review of systems is  negative for headache, vision changes, chest pain, shortness of breath, abdominal pain, urinary sx, bowel changes. Past medical, surgical, and social history reviewed and updated  Medications and allergies reviewed and updated      O: /68   Pulse 94   Temp 97.5 °F (36.4 °C) (Temporal)   Resp 14   Wt 181 lb 6.4 oz (82.3 kg)   SpO2 96%   BMI 34.28 kg/m²   GEN: No acute distress, cooperative, well nourished, alert. HEENT: PEERLA, EOMI , normocephalic/atraumatic, nares and oropharynx clear. Mucous membranes normal, Tympanic membranes clear bilaterally. R ear fluid behind tympanic membrane, no erythema, no discharge. No cerumen  Neck: soft, supple, no thyromegaly, mass, no Lymphadenopathy  CV: Regular rate and rhythm, no murmur, rubs, gallops. No edema. Resp: Clear to auscultation bilaterally good air entry bilaterally  No crackles, wheeze. Breathing comfortably. Psych: mood stable, No suicidal thoughts or ideation         ASSESSMENT / PLAN:    1. ETD (Eustachian tube dysfunction), right  Persistent sx s/p treatment of acute URI  Tx with medrol dosepak x 1 and consider referral to ENT for persistent or increased sx severity  Discussed use, benefit, and side effects of prescribed medications. Barriers to medication compliance addressed. All patient questions answered. Pt voiced understanding. 2. Right ear pain  D/t above    3.  Acute URI  Resolving well with abx, cont full

## 2022-12-12 ENCOUNTER — TELEPHONE (OUTPATIENT)
Dept: FAMILY MEDICINE CLINIC | Age: 57
End: 2022-12-12

## 2022-12-12 NOTE — TELEPHONE ENCOUNTER
Patient was admitted to the hospital over the weekend for elevated blood pressure/ possible heart attack and was to to make a hospital follow up as soon as possible. I scheduled her for Thursday but she says she would like to be seen earlier if possible. Please call to schedule if it is.

## 2022-12-13 ENCOUNTER — OFFICE VISIT (OUTPATIENT)
Dept: FAMILY MEDICINE CLINIC | Age: 57
End: 2022-12-13
Payer: COMMERCIAL

## 2022-12-13 VITALS
DIASTOLIC BLOOD PRESSURE: 84 MMHG | TEMPERATURE: 98.1 F | WEIGHT: 176 LBS | RESPIRATION RATE: 13 BRPM | SYSTOLIC BLOOD PRESSURE: 130 MMHG | BODY MASS INDEX: 33.25 KG/M2 | HEART RATE: 84 BPM | OXYGEN SATURATION: 95 %

## 2022-12-13 DIAGNOSIS — F90.0 ATTENTION DEFICIT HYPERACTIVITY DISORDER (ADHD), PREDOMINANTLY INATTENTIVE TYPE: ICD-10-CM

## 2022-12-13 DIAGNOSIS — R03.0 ELEVATED BLOOD PRESSURE READING: ICD-10-CM

## 2022-12-13 DIAGNOSIS — M79.602 LEFT ARM PAIN: Primary | ICD-10-CM

## 2022-12-13 DIAGNOSIS — M79.18 MYOFASCIAL PAIN: ICD-10-CM

## 2022-12-13 PROCEDURE — 99214 OFFICE O/P EST MOD 30 MIN: CPT | Performed by: FAMILY MEDICINE

## 2022-12-13 NOTE — PROGRESS NOTES
Here for f/u and recheck of recent ER/hospital visit. Pt went to [facility] ER 12/9 due to some LUE pain. Sx started with some throbbing to LUE, and was occurring at rest.  Pt states that the sx continued but did not have any chest pain, shortness of breath. Pt was given ASA and they checked her blood pressure and it was 'very low'. No dizziness, lightheadedness. It was checked with wrist blood pressure cuff. She had continued discomfort and rechecked blood pressure was high at 763 systolic. pDue to sx, pt went over to ER for evaluation at [facility] ER.  pt was evaluated in ER with bloodwork, CXR that was nonfocal and was told that her sx were not heart related. In ER, her blood pressure was noted to be elevated with blood pressure of 155/102. Pt was d/c home and told to f/u here for reeval and recheck. Pt feels better at this time. They did not give her any blood pressure meds    Since hospital visit, did check blood pressure a few times and is seeing 140-150's sometimes and otherwise is normal.  Pt does take vyvanse but does have days that she forgets to take and does not feel blood pressure related. Except as noted above in the history of present illness, the review of systems is  negative for headache, vision changes, chest pain, shortness of breath, abdominal pain, urinary sx, bowel changes. Past medical, surgical, and social history reviewed and updated  Medications and allergies reviewed and updated      O: /84   Pulse 84   Temp 98.1 °F (36.7 °C) (Temporal)   Resp 13   Wt 176 lb (79.8 kg)   SpO2 95%   BMI 33.25 kg/m²   GEN: No acute distress, cooperative, well nourished, alert. HEENT: PEERLA, EOMI , normocephalic/atraumatic, nares and oropharynx clear. Mucous membranes normal, Tympanic membranes clear bilaterally. Neck: soft, supple, no thyromegaly, mass, no Lymphadenopathy  CV: Regular rate and rhythm, no murmur, rubs, gallops. No edema.   Resp: Clear to auscultation bilaterally good air entry bilaterally  No crackles, wheeze. Breathing comfortably. Psych: mood stable, No suicidal thoughts or ideation         Current Outpatient Medications   Medication Sig Dispense Refill    methylPREDNISolone (MEDROL DOSEPACK) 4 MG tablet Take by mouth as directed in pack 21 tablet 0    pantoprazole (PROTONIX) 40 MG tablet TAKE 1 TABLET BY MOUTH EVERY MORNING BEFORE BREAKFAST 30 tablet 5    lisdexamfetamine (VYVANSE) 30 MG capsule Take 1 capsule by mouth every morning for 90 days. 90 capsule 0    hydrOXYzine HCl (ATARAX) 25 MG tablet Take 1 tablet by mouth every 8 hours as needed for Anxiety 30 tablet 5    gabapentin (NEURONTIN) 300 MG capsule Take 1 capsule by mouth 3 times daily for 180 days. Intended supply: 30 days 90 capsule 5    naproxen (NAPROSYN) 500 MG tablet Take 1 tablet by mouth 2 times daily (with meals) 60 tablet 1    albuterol sulfate HFA (PROVENTIL HFA) 108 (90 Base) MCG/ACT inhaler Inhale 2 puffs into the lungs every 6 hours as needed for Wheezing 1 each 5    ciprofloxacin (CIPRO) 500 MG tablet Take 1 tablet by mouth daily as needed (sexual activity) 30 tablet 2    desvenlafaxine succinate (PRISTIQ) 50 MG TB24 extended release tablet Take 1 tablet by mouth daily 30 tablet 5    lisdexamfetamine (VYVANSE) 30 MG capsule Take 1 capsule by mouth every morning for 30 days. 30 capsule 0     No current facility-administered medications for this visit. ASSESSMENT / PLAN:    1. Left arm pain  Resolved  Suspect muscular  Reviewed neg ER visit  Exam normal, range of motion and strength normal  Will monitor    2. Elevated blood pressure reading  Recheck today normal  Cont to monitor at home with ABM, discussed proper way to track/measure blood pressure  F/u for elevation of blood pressure > 130/80s  Cont lifestyle mgt    3. Attention deficit hyperactivity disorder (ADHD), predominantly inattentive type  Stable  No refills given today    4.  Myofascial pain  Cont supportive therapy, exercise, range of motion    Cont gabapentin           Follow-up appointment:   Call or return to clinic prn if these symptoms worsen or fail to improve as anticipated. Discussed use, benefit, and side effects of all prescribed medications. Barriers to medication compliance addressed. All patient questions answered. Pt voiced understanding. When applicable, patient's outside records were reviewed through [facility]. The patient has signed appropriate paperworks/consents.

## 2023-02-08 ENCOUNTER — OFFICE VISIT (OUTPATIENT)
Dept: FAMILY MEDICINE CLINIC | Age: 58
End: 2023-02-08
Payer: COMMERCIAL

## 2023-02-08 VITALS
HEART RATE: 102 BPM | WEIGHT: 174.4 LBS | SYSTOLIC BLOOD PRESSURE: 142 MMHG | OXYGEN SATURATION: 95 % | RESPIRATION RATE: 12 BRPM | BODY MASS INDEX: 32.95 KG/M2 | DIASTOLIC BLOOD PRESSURE: 70 MMHG | TEMPERATURE: 97.6 F

## 2023-02-08 DIAGNOSIS — J01.00 ACUTE MAXILLARY SINUSITIS, RECURRENCE NOT SPECIFIED: Primary | ICD-10-CM

## 2023-02-08 PROCEDURE — 99213 OFFICE O/P EST LOW 20 MIN: CPT | Performed by: FAMILY MEDICINE

## 2023-02-08 RX ORDER — AMOXICILLIN AND CLAVULANATE POTASSIUM 875; 125 MG/1; MG/1
1 TABLET, FILM COATED ORAL 2 TIMES DAILY
Qty: 20 TABLET | Refills: 0 | Status: SHIPPED | OUTPATIENT
Start: 2023-02-08 | End: 2023-02-18

## 2023-02-08 RX ORDER — HYDROCODONE POLISTIREX AND CHLORPHENIRAMINE POLISTIREX 10; 8 MG/5ML; MG/5ML
5 SUSPENSION, EXTENDED RELEASE ORAL EVERY 12 HOURS PRN
Qty: 115 ML | Refills: 0 | Status: SHIPPED | OUTPATIENT
Start: 2023-02-08 | End: 2023-02-15

## 2023-02-08 SDOH — ECONOMIC STABILITY: FOOD INSECURITY: WITHIN THE PAST 12 MONTHS, THE FOOD YOU BOUGHT JUST DIDN'T LAST AND YOU DIDN'T HAVE MONEY TO GET MORE.: NEVER TRUE

## 2023-02-08 SDOH — ECONOMIC STABILITY: INCOME INSECURITY: HOW HARD IS IT FOR YOU TO PAY FOR THE VERY BASICS LIKE FOOD, HOUSING, MEDICAL CARE, AND HEATING?: NOT HARD AT ALL

## 2023-02-08 SDOH — ECONOMIC STABILITY: HOUSING INSECURITY
IN THE LAST 12 MONTHS, WAS THERE A TIME WHEN YOU DID NOT HAVE A STEADY PLACE TO SLEEP OR SLEPT IN A SHELTER (INCLUDING NOW)?: NO

## 2023-02-08 SDOH — ECONOMIC STABILITY: FOOD INSECURITY: WITHIN THE PAST 12 MONTHS, YOU WORRIED THAT YOUR FOOD WOULD RUN OUT BEFORE YOU GOT MONEY TO BUY MORE.: NEVER TRUE

## 2023-02-08 ASSESSMENT — PATIENT HEALTH QUESTIONNAIRE - PHQ9
4. FEELING TIRED OR HAVING LITTLE ENERGY: 0
2. FEELING DOWN, DEPRESSED OR HOPELESS: 0
SUM OF ALL RESPONSES TO PHQ QUESTIONS 1-9: 0
SUM OF ALL RESPONSES TO PHQ QUESTIONS 1-9: 0
1. LITTLE INTEREST OR PLEASURE IN DOING THINGS: 0
7. TROUBLE CONCENTRATING ON THINGS, SUCH AS READING THE NEWSPAPER OR WATCHING TELEVISION: 0
SUM OF ALL RESPONSES TO PHQ9 QUESTIONS 1 & 2: 0
SUM OF ALL RESPONSES TO PHQ QUESTIONS 1-9: 0
SUM OF ALL RESPONSES TO PHQ QUESTIONS 1-9: 0
3. TROUBLE FALLING OR STAYING ASLEEP: 0
9. THOUGHTS THAT YOU WOULD BE BETTER OFF DEAD, OR OF HURTING YOURSELF: 0
5. POOR APPETITE OR OVEREATING: 0
10. IF YOU CHECKED OFF ANY PROBLEMS, HOW DIFFICULT HAVE THESE PROBLEMS MADE IT FOR YOU TO DO YOUR WORK, TAKE CARE OF THINGS AT HOME, OR GET ALONG WITH OTHER PEOPLE: 0
6. FEELING BAD ABOUT YOURSELF - OR THAT YOU ARE A FAILURE OR HAVE LET YOURSELF OR YOUR FAMILY DOWN: 0
8. MOVING OR SPEAKING SO SLOWLY THAT OTHER PEOPLE COULD HAVE NOTICED. OR THE OPPOSITE, BEING SO FIGETY OR RESTLESS THAT YOU HAVE BEEN MOVING AROUND A LOT MORE THAN USUAL: 0

## 2023-02-08 NOTE — PROGRESS NOTES
Patient is here for cough , which started on Saturday . No sore throat . Hoarseness. Dry cough , but occasionally productive of yellow sputum. .  Nasal congestion and post nasal drip started on Sunday . No fever. No wheezing or shortness of breath . Some chest congestion . No nausea, vomiting, diarrhea . Dizziness this am when bent over and lasted 2 minutes. She had eaten a biscuit. She had tea and coffee. Sleeping difficulties congestion and cough . No ear pain or pressure. Non smoker. No h/o asthma but occasionally needs an inhaler. No fever. She had a negative COVID test yesterday . She works at [employer]. Review of Systems    ROS: All other systems were reviewed and are negative . Patient's allergies and medications were reviewed. Patient's past medical, surgical, social , and family history were reviewed. OBJECTIVE:  BP (!) 142/70   Pulse (!) 102   Temp 97.6 °F (36.4 °C) (Temporal)   Resp 12   Wt 174 lb 6.4 oz (79.1 kg)   SpO2 95%   BMI 32.95 kg/m²     Physical Exam    General: NAD, cooperative, alert and oriented X 3. Mood / affect is good. good insight. well hydrated. HEENT: PERRLA, EOMI, TMs - clear. Nasopharynx clear. Right maxillary tenderness. Neck : no lymphadenopathy, supple, FROM  CV: Regular rate and rhythm , no murmurs/ rub/ gallop. No edema. Lungs : CTA bilaterally, breathing comfortably  Abdomen: positive bowel sounds, soft , non tender, non distended. No hepatosplenomegaly. No CVA tenderness. Skin: no rashes. Non tender. ASSESSMENT/  PLAN:  1. Acute maxillary sinusitis, recurrence not specified  - Push fluids - water. Netti pot prn.   - amoxicillin-clavulanate (AUGMENTIN) 875-125 MG per tablet; Take 1 tablet by mouth 2 times daily for 10 days  Dispense: 20 tablet; Refill: 0  - HYDROcodone-chlorpheniramine (TUSSIONEX PENNKINETIC ER) 10-8 MG/5ML SUER; Take 5 mLs by mouth every 12 hours as needed (cough) for up to 7 days.  Max Daily Amount: 10 mLs Dispense: 115 mL; Refill: 0. Side effects of medication discussed including sedation and addiction potential.  - Controlled Substance Monitoring:    Acute and Chronic Pain Monitoring:   RX Monitoring 2/8/2023   Attestation -   Periodic Controlled Substance Monitoring Possible medication side effects, risk of tolerance/dependence & alternative treatments discussed. ;No signs of potential drug abuse or diversion identified. F/u if no improvement 7-10d/ prn increased symptoms.

## 2023-02-13 ENCOUNTER — TELEPHONE (OUTPATIENT)
Dept: FAMILY MEDICINE CLINIC | Age: 58
End: 2023-02-13

## 2023-02-13 RX ORDER — DOXYCYCLINE HYCLATE 100 MG
100 TABLET ORAL 2 TIMES DAILY
Qty: 20 TABLET | Refills: 0 | Status: SHIPPED | OUTPATIENT
Start: 2023-02-13 | End: 2023-02-23

## 2023-02-13 NOTE — TELEPHONE ENCOUNTER
Stop Augmentin as likely contributing to diarrhea. A prescription for Doxycycline twice daily X 10 days was sent to the pharmacy . Please inform the patient .

## 2023-02-13 NOTE — TELEPHONE ENCOUNTER
Pt was in to see Dr. Taylor Skinner last week. Medication given. Was feeling better and now back to feeling bad again.      Now she has Diarrhea(Started Sunday night - 10 times), Vomiting, Coughing - slight yellow mucus    No Fever  Chills - no  Hot sweats - yes  No Sore Throat  Facial Pain and Pressure - yes, slight    Is wondering if she needs something different called in?

## 2023-02-19 RX ORDER — PANTOPRAZOLE SODIUM 40 MG/1
TABLET, DELAYED RELEASE ORAL
Qty: 30 TABLET | Refills: 5 | Status: SHIPPED | OUTPATIENT
Start: 2023-02-19

## 2023-03-24 ENCOUNTER — OFFICE VISIT (OUTPATIENT)
Dept: FAMILY MEDICINE CLINIC | Age: 58
End: 2023-03-24
Payer: COMMERCIAL

## 2023-03-24 VITALS
TEMPERATURE: 97.9 F | HEART RATE: 60 BPM | BODY MASS INDEX: 32.16 KG/M2 | DIASTOLIC BLOOD PRESSURE: 70 MMHG | SYSTOLIC BLOOD PRESSURE: 120 MMHG | WEIGHT: 170.2 LBS | RESPIRATION RATE: 12 BRPM | OXYGEN SATURATION: 96 %

## 2023-03-24 DIAGNOSIS — M79.18 MYOFASCIAL PAIN: ICD-10-CM

## 2023-03-24 DIAGNOSIS — M54.41 ACUTE RIGHT-SIDED LOW BACK PAIN WITH RIGHT-SIDED SCIATICA: ICD-10-CM

## 2023-03-24 DIAGNOSIS — R03.0 ELEVATED BLOOD PRESSURE READING: ICD-10-CM

## 2023-03-24 DIAGNOSIS — M51.36 DDD (DEGENERATIVE DISC DISEASE), LUMBAR: Primary | ICD-10-CM

## 2023-03-24 DIAGNOSIS — J06.9 ACUTE URI: ICD-10-CM

## 2023-03-24 PROCEDURE — 99214 OFFICE O/P EST MOD 30 MIN: CPT | Performed by: FAMILY MEDICINE

## 2023-03-24 RX ORDER — TIZANIDINE 4 MG/1
4 TABLET ORAL 3 TIMES DAILY PRN
Qty: 30 TABLET | Refills: 0 | Status: SHIPPED | OUTPATIENT
Start: 2023-03-24

## 2023-03-24 RX ORDER — METHYLPREDNISOLONE 4 MG/1
TABLET ORAL
Qty: 21 TABLET | Refills: 0 | Status: SHIPPED | OUTPATIENT
Start: 2023-03-24

## 2023-03-24 NOTE — PROGRESS NOTES
Here for eval of URI sx, sinus sx for about 2d. Pt working in [address] [employer] and did notice some sx started 2-3d ago. Pt does have nasal congestion and sinus congestion, but does not have fever. Pt states that she had to go to [place] for a meeting and when she came back, did have some URI sx. No chest congestion. Pt has had a lot of sneezing, and irritation    Pt states that she has noted some lower back pain in R post back, and above gluteal area on R. Sx are pretty constant and can be worse with laying down, with some mild radiation of sx into leg. Pt does not remember any trauma, injury. Does have known hx of degenerative disc disease  pt has been taking naprosyn, but not helping. No weakness in leg. No urinary sx. Pt did have some recent xrays and MRI of hip 10/2022 but this feels different. Last imaging of lumbar spine was 10/2020      Except as noted above in the history of present illness, the review of systems is  negative for headache, vision changes, chest pain, shortness of breath, abdominal pain, urinary sx, bowel changes. Past medical, surgical, and social history reviewed and updated  Medications and allergies reviewed and updated         O: /70   Pulse 60   Temp 97.9 °F (36.6 °C)   Resp 12   Wt 170 lb 3.2 oz (77.2 kg)   SpO2 96%   BMI 32.16 kg/m²   GEN: No acute distress, cooperative, well nourished, alert. HEENT: PEERLA, EOMI , normocephalic/atraumatic, nares and oropharynx clear. Mucous membranes normal, Tympanic membranes clear bilaterally. Neck: soft, supple, no thyromegaly, mass, no Lymphadenopathy  CV: Regular rate and rhythm, no murmur, rubs, gallops. No edema. Resp: Clear to auscultation bilaterally good air entry bilaterally  No crackles, wheeze. Breathing comfortably. Psych: mood stable, No suicidal thoughts or ideation   Musc: full range of motion bilateral lower extremities.   Neg straight leg-raise bilaterally        Current Outpatient Medications   Medication Sig

## 2023-04-02 RX ORDER — TIZANIDINE 4 MG/1
TABLET ORAL
Qty: 30 TABLET | Refills: 0 | Status: SHIPPED | OUTPATIENT
Start: 2023-04-02

## 2023-04-09 RX ORDER — TIZANIDINE 4 MG/1
TABLET ORAL
Qty: 30 TABLET | Refills: 0 | Status: SHIPPED | OUTPATIENT
Start: 2023-04-09

## 2023-05-08 ENCOUNTER — OFFICE VISIT (OUTPATIENT)
Dept: FAMILY MEDICINE CLINIC | Age: 58
End: 2023-05-08
Payer: COMMERCIAL

## 2023-05-08 VITALS
RESPIRATION RATE: 12 BRPM | SYSTOLIC BLOOD PRESSURE: 126 MMHG | HEART RATE: 90 BPM | BODY MASS INDEX: 32.65 KG/M2 | TEMPERATURE: 97.3 F | DIASTOLIC BLOOD PRESSURE: 80 MMHG | WEIGHT: 172.8 LBS | OXYGEN SATURATION: 94 %

## 2023-05-08 DIAGNOSIS — F90.0 ATTENTION DEFICIT HYPERACTIVITY DISORDER (ADHD), PREDOMINANTLY INATTENTIVE TYPE: ICD-10-CM

## 2023-05-08 DIAGNOSIS — R30.0 DYSURIA: ICD-10-CM

## 2023-05-08 DIAGNOSIS — R10.9 RIGHT FLANK PAIN: ICD-10-CM

## 2023-05-08 DIAGNOSIS — M79.18 MYOFASCIAL PAIN: Primary | ICD-10-CM

## 2023-05-08 DIAGNOSIS — R35.0 URINARY FREQUENCY: ICD-10-CM

## 2023-05-08 LAB
BILIRUBIN, POC: NEGATIVE
BLOOD URINE, POC: NEGATIVE
CLARITY, POC: CLEAR
COLOR, POC: YELLOW
GLUCOSE URINE, POC: NEGATIVE
KETONES, POC: NEGATIVE
LEUKOCYTE EST, POC: NEGATIVE
NITRITE, POC: NEGATIVE
PH, POC: 5
PROTEIN, POC: NEGATIVE
SPECIFIC GRAVITY, POC: 1.03
UROBILINOGEN, POC: 0.2

## 2023-05-08 PROCEDURE — 81002 URINALYSIS NONAUTO W/O SCOPE: CPT | Performed by: FAMILY MEDICINE

## 2023-05-08 PROCEDURE — 99214 OFFICE O/P EST MOD 30 MIN: CPT | Performed by: FAMILY MEDICINE

## 2023-05-08 RX ORDER — ALBUTEROL SULFATE 90 UG/1
2 AEROSOL, METERED RESPIRATORY (INHALATION) EVERY 6 HOURS PRN
Qty: 1 EACH | Refills: 5 | Status: SHIPPED | OUTPATIENT
Start: 2023-05-08

## 2023-05-08 RX ORDER — HYDROXYZINE HYDROCHLORIDE 25 MG/1
25 TABLET, FILM COATED ORAL EVERY 8 HOURS PRN
Qty: 30 TABLET | Refills: 5 | Status: SHIPPED | OUTPATIENT
Start: 2023-05-08

## 2023-05-08 RX ORDER — HYDROCODONE BITARTRATE AND ACETAMINOPHEN 5; 325 MG/1; MG/1
1 TABLET ORAL EVERY 4 HOURS PRN
Qty: 18 TABLET | Refills: 0 | Status: SHIPPED | OUTPATIENT
Start: 2023-05-08 | End: 2023-05-11

## 2023-05-08 RX ORDER — DESVENLAFAXINE SUCCINATE 50 MG/1
50 TABLET, EXTENDED RELEASE ORAL DAILY
Qty: 30 TABLET | Refills: 5 | Status: SHIPPED | OUTPATIENT
Start: 2023-05-08

## 2023-05-08 RX ORDER — METHYLPREDNISOLONE 4 MG/1
TABLET ORAL
Qty: 21 TABLET | Refills: 0 | Status: CANCELLED | OUTPATIENT
Start: 2023-05-08

## 2023-05-08 NOTE — PROGRESS NOTES
Here for f/u ofsome persistent R flank pain issues. Pt was seen for this about 6 weeks ago and despite evaluation, has continued. Pain is all the time, and did not find benefit with the steroid pack. Pt is concerned it is her kidneys, as she has had issues of kidney cysts in the past and is concerned that it is recurrent. No n/v. No blood in urine, pts last CT about 3 years ago. Pt is not doing a lot of work with lifting at this time. No n/v. Bowels are relatively normal, no change and no blood. Ptis s/p FABIO and BSO    Here for follow up of ADD. Pt states that they are doing very well with current therapy and feels that control of symptoms are adequate at this time. No breakthru symptoms and no need/indication for adjustment in therapy. Taking meds as prescribed and denies any illicit medication usage of misuse of their stimulant thearpy. Mood is stable and denies any issues of depression or anxiety associated with treatment of ADD. Except as noted above in the history of present illness, the review of systems is  negative for headache, vision changes, chest pain, shortness of breath, abdominal pain, urinary sx, bowel changes. Past medical, surgical, and social history reviewed and updated  Medications and allergies reviewed and updated      O: /80   Pulse 90   Temp 97.3 °F (36.3 °C) (Temporal)   Resp 12   Wt 172 lb 12.8 oz (78.4 kg)   SpO2 94%   BMI 32.65 kg/m²   GEN: No acute distress, cooperative, well nourished, alert. HEENT: PEERLA, EOMI , normocephalic/atraumatic, nares and oropharynx clear. Mucous membranes normal, Tympanic membranes clear bilaterally. Neck: soft, supple, no thyromegaly, mass, no Lymphadenopathy  CV: Regular rate and rhythm, no murmur, rubs, gallops. No edema. Resp: Clear to auscultation bilaterally good air entry bilaterally  No crackles, wheeze. Breathing comfortably. Abd: soft, mild RLQ tender to palpation w/o tender to palpation at mcburneys pt.   No

## 2023-05-09 ENCOUNTER — HOSPITAL ENCOUNTER (OUTPATIENT)
Dept: CT IMAGING | Age: 58
Discharge: HOME | End: 2023-05-09
Payer: COMMERCIAL

## 2023-05-09 DIAGNOSIS — R35.0 URINARY FREQUENCY: ICD-10-CM

## 2023-05-09 DIAGNOSIS — R10.9 RIGHT FLANK PAIN: ICD-10-CM

## 2023-05-09 DIAGNOSIS — R30.0 DYSURIA: ICD-10-CM

## 2023-05-09 PROCEDURE — 6360000004 HC RX CONTRAST MEDICATION: Performed by: FAMILY MEDICINE

## 2023-05-09 PROCEDURE — 74177 CT ABD & PELVIS W/CONTRAST: CPT

## 2023-05-09 RX ADMIN — IOPAMIDOL 75 ML: 755 INJECTION, SOLUTION INTRAVENOUS at 11:05

## 2023-06-20 ENCOUNTER — TELEMEDICINE (OUTPATIENT)
Dept: FAMILY MEDICINE CLINIC | Age: 58
End: 2023-06-20
Payer: COMMERCIAL

## 2023-06-20 DIAGNOSIS — F90.0 ATTENTION DEFICIT HYPERACTIVITY DISORDER (ADHD), PREDOMINANTLY INATTENTIVE TYPE: ICD-10-CM

## 2023-06-20 DIAGNOSIS — F33.0 MAJOR DEPRESSIVE DISORDER, RECURRENT EPISODE, MILD (HCC): ICD-10-CM

## 2023-06-20 DIAGNOSIS — N28.1 ACQUIRED CYST OF KIDNEY: ICD-10-CM

## 2023-06-20 DIAGNOSIS — Z13.220 SCREENING, LIPID: ICD-10-CM

## 2023-06-20 DIAGNOSIS — N39.0 RECURRENT UTI: ICD-10-CM

## 2023-06-20 DIAGNOSIS — R73.9 HYPERGLYCEMIA: Primary | ICD-10-CM

## 2023-06-20 DIAGNOSIS — R35.0 URINARY FREQUENCY: ICD-10-CM

## 2023-06-20 DIAGNOSIS — R10.9 RIGHT FLANK PAIN: ICD-10-CM

## 2023-06-20 DIAGNOSIS — K21.9 GASTROESOPHAGEAL REFLUX DISEASE WITHOUT ESOPHAGITIS: ICD-10-CM

## 2023-06-20 PROCEDURE — 99214 OFFICE O/P EST MOD 30 MIN: CPT | Performed by: FAMILY MEDICINE

## 2023-06-20 RX ORDER — ALPRAZOLAM 0.5 MG/1
0.5 TABLET ORAL 3 TIMES DAILY PRN
Qty: 30 TABLET | Refills: 0 | Status: SHIPPED | OUTPATIENT
Start: 2023-06-20 | End: 2023-07-20

## 2023-06-20 RX ORDER — DEXMETHYLPHENIDATE HYDROCHLORIDE 15 MG/1
15 CAPSULE, EXTENDED RELEASE ORAL DAILY
Qty: 30 CAPSULE | Refills: 0 | Status: SHIPPED | OUTPATIENT
Start: 2023-06-20 | End: 2023-07-20

## 2023-07-05 ENCOUNTER — OFFICE VISIT (OUTPATIENT)
Dept: FAMILY MEDICINE CLINIC | Age: 58
End: 2023-07-05
Payer: COMMERCIAL

## 2023-07-05 VITALS
OXYGEN SATURATION: 97 % | DIASTOLIC BLOOD PRESSURE: 80 MMHG | SYSTOLIC BLOOD PRESSURE: 120 MMHG | HEIGHT: 61 IN | HEART RATE: 100 BPM | WEIGHT: 180 LBS | BODY MASS INDEX: 33.99 KG/M2 | TEMPERATURE: 98.6 F

## 2023-07-05 DIAGNOSIS — S93.602A SPRAIN OF LEFT FOOT, INITIAL ENCOUNTER: Primary | ICD-10-CM

## 2023-07-05 PROCEDURE — 99214 OFFICE O/P EST MOD 30 MIN: CPT | Performed by: NURSE PRACTITIONER

## 2023-07-05 RX ORDER — TRAMADOL HYDROCHLORIDE 50 MG/1
50 TABLET ORAL EVERY 6 HOURS PRN
Qty: 12 TABLET | Refills: 0 | Status: SHIPPED | OUTPATIENT
Start: 2023-07-05 | End: 2023-07-08

## 2023-07-05 ASSESSMENT — ENCOUNTER SYMPTOMS
DIARRHEA: 0
BACK PAIN: 0
CONSTIPATION: 0
WHEEZING: 0
COUGH: 0
ABDOMINAL PAIN: 0
SINUS PAIN: 0
COLOR CHANGE: 0
SHORTNESS OF BREATH: 0
SINUS PRESSURE: 0

## 2023-07-05 NOTE — PROGRESS NOTES
Gil Byrnes (:  1965) is a 61 y.o. female,Established patient, here for evaluation of the following chief complaint(s): Ankle Injury (Left foot/ankle, fell and heard a snap, has been seen at Urgent Care, soft tissue sprain)      ASSESSMENT/PLAN:  1. Sprain of left foot, initial encounter  Assessment & Plan:  Tramadol for pain  Continue RICE therapy  Call if no better in 2 weeks  Orders:  -     traMADol (ULTRAM) 50 MG tablet; Take 1 tablet by mouth every 6 hours as needed for Pain for up to 3 days. Intended supply: 3 days. Take lowest dose possible to manage pain Max Daily Amount: 200 mg, Disp-12 tablet, R-0Normal      No follow-ups on file. SUBJECTIVE/OBJECTIVE:  HPI  Here for left foot pain that started after right when her ankle 2 weeks ago. She did initially go to urgent care and underwent x-ray which was normal.  she reports swelling and bruising. Bruising has since improved but she is still having a hard time ambulating due to pain. She has been taking ibuprofen, icing, and elevating when she can. Walking boot in place which helps. She is concerned for fracture states that she broke her foot several years ago and her fracture was initially missed on x-ray. Current Outpatient Medications   Medication Sig Dispense Refill    traMADol (ULTRAM) 50 MG tablet Take 1 tablet by mouth every 6 hours as needed for Pain for up to 3 days. Intended supply: 3 days. Take lowest dose possible to manage pain Max Daily Amount: 200 mg 12 tablet 0    ALPRAZolam (XANAX) 0.5 MG tablet Take 1 tablet by mouth 3 times daily as needed for Sleep or Anxiety for up to 30 days. 30 tablet 0    Dexmethylphenidate HCl ER 15 MG CP24 Take 15 mg by mouth daily for 30 days.  Max Daily Amount: 15 mg 30 capsule 0    pantoprazole (PROTONIX) 40 MG tablet TAKE 1 TABLET BY MOUTH EVERY MORNING BEFORE BREAKFAST 30 tablet 5    albuterol sulfate HFA (PROVENTIL HFA) 108 (90 Base) MCG/ACT inhaler Inhale 2 puffs into the lungs every 6 hours as

## 2023-07-20 ENCOUNTER — TELEPHONE (OUTPATIENT)
Dept: FAMILY MEDICINE CLINIC | Age: 58
End: 2023-07-20

## 2023-07-20 DIAGNOSIS — S93.602S SPRAIN OF LEFT FOOT, SEQUELA: Primary | ICD-10-CM

## 2023-07-20 NOTE — TELEPHONE ENCOUNTER
Spoke to pt and relayed referral information. Pt confirmed understanding with no additional questions or concerns.

## 2023-07-20 NOTE — TELEPHONE ENCOUNTER
Patient was told to call back for a Orthopedic referral if her foot pain continued; she would like a referral. Thanks

## 2023-08-04 DIAGNOSIS — F33.0 MAJOR DEPRESSIVE DISORDER, RECURRENT EPISODE, MILD (HCC): ICD-10-CM

## 2023-08-04 NOTE — TELEPHONE ENCOUNTER
Requested Prescriptions     Pending Prescriptions Disp Refills    ALPRAZolam (XANAX) 0.5 MG tablet [Pharmacy Med Name: ALPRAZOLAM 0.5MG TABLETS] 30 tablet 0     Sig: TAKE 1 TABLET BY MOUTH THREE TIMES DAILY AS NEEDED FOR SLEEP OR ANXIETY          Last Office Visit: 7/5/2023     Next Office Visit: Visit date not found     Last Labs: 3/21/22

## 2023-08-05 RX ORDER — ALPRAZOLAM 0.5 MG/1
TABLET ORAL
Qty: 30 TABLET | Refills: 0 | Status: SHIPPED | OUTPATIENT
Start: 2023-08-05 | End: 2023-09-04

## 2023-10-02 RX ORDER — CIPROFLOXACIN 500 MG/1
TABLET, FILM COATED ORAL
Qty: 30 TABLET | Refills: 2 | Status: SHIPPED | OUTPATIENT
Start: 2023-10-02

## 2023-10-02 NOTE — TELEPHONE ENCOUNTER
Requested Prescriptions     Pending Prescriptions Disp Refills    ciprofloxacin (CIPRO) 500 MG tablet [Pharmacy Med Name: CIPROFLOXACIN 500MG TABLETS] 30 tablet 2     Sig: TAKE 1 TABLET BY MOUTH DAILY SEXUAL ACTIVITY AS NEEDED          Last Office Visit: 7/5/2023     Next Office Visit: Visit date not found     Last Labs: 3/21/22

## 2023-10-19 ENCOUNTER — TELEPHONE (OUTPATIENT)
Dept: FAMILY MEDICINE CLINIC | Age: 58
End: 2023-10-19

## 2023-10-19 NOTE — TELEPHONE ENCOUNTER
----- Message from Rinaditya Mayers sent at 10/18/2023  4:38 PM EDT -----  Subject: Referral Request    Reason for referral request? physical 11/17/23 needs labs ordered   Provider patient wants to be referred to(if known):     Provider Phone Number(if known):     Additional Information for Provider?   ---------------------------------------------------------------------------  --------------  Andreina Rustburg Rebekah    [phone]; OK to leave message on voicemail  ---------------------------------------------------------------------------  --------------

## 2023-11-17 ENCOUNTER — OFFICE VISIT (OUTPATIENT)
Dept: FAMILY MEDICINE CLINIC | Age: 58
End: 2023-11-17

## 2023-11-17 VITALS
TEMPERATURE: 97.9 F | DIASTOLIC BLOOD PRESSURE: 66 MMHG | RESPIRATION RATE: 14 BRPM | WEIGHT: 178.6 LBS | SYSTOLIC BLOOD PRESSURE: 120 MMHG | HEART RATE: 83 BPM | OXYGEN SATURATION: 97 % | BODY MASS INDEX: 33.72 KG/M2 | HEIGHT: 61 IN

## 2023-11-17 DIAGNOSIS — F33.0 MAJOR DEPRESSIVE DISORDER, RECURRENT EPISODE, MILD (HCC): ICD-10-CM

## 2023-11-17 DIAGNOSIS — E66.9 CLASS 1 OBESITY WITHOUT SERIOUS COMORBIDITY WITH BODY MASS INDEX (BMI) OF 33.0 TO 33.9 IN ADULT, UNSPECIFIED OBESITY TYPE: ICD-10-CM

## 2023-11-17 DIAGNOSIS — G89.29 CHRONIC NECK PAIN: ICD-10-CM

## 2023-11-17 DIAGNOSIS — M54.2 CHRONIC NECK PAIN: ICD-10-CM

## 2023-11-17 DIAGNOSIS — N39.0 RECURRENT UTI: ICD-10-CM

## 2023-11-17 DIAGNOSIS — F90.0 ATTENTION DEFICIT HYPERACTIVITY DISORDER (ADHD), PREDOMINANTLY INATTENTIVE TYPE: ICD-10-CM

## 2023-11-17 DIAGNOSIS — R73.9 HYPERGLYCEMIA: ICD-10-CM

## 2023-11-17 DIAGNOSIS — M79.18 MYOFASCIAL PAIN: ICD-10-CM

## 2023-11-17 DIAGNOSIS — N28.1 ACQUIRED CYST OF KIDNEY: ICD-10-CM

## 2023-11-17 DIAGNOSIS — Z00.00 ROUTINE GENERAL MEDICAL EXAMINATION AT A HEALTH CARE FACILITY: Primary | ICD-10-CM

## 2023-11-17 DIAGNOSIS — K21.9 GASTROESOPHAGEAL REFLUX DISEASE WITHOUT ESOPHAGITIS: ICD-10-CM

## 2023-11-17 DIAGNOSIS — Z12.31 ENCOUNTER FOR SCREENING MAMMOGRAM FOR MALIGNANT NEOPLASM OF BREAST: ICD-10-CM

## 2023-11-17 DIAGNOSIS — Z23 NEEDS FLU SHOT: ICD-10-CM

## 2023-11-17 RX ORDER — TIZANIDINE 4 MG/1
4 TABLET ORAL EVERY 8 HOURS PRN
Qty: 30 TABLET | Refills: 0 | Status: SHIPPED | OUTPATIENT
Start: 2023-11-17

## 2023-11-17 RX ORDER — ALPRAZOLAM 0.5 MG/1
TABLET ORAL
COMMUNITY
Start: 2023-09-23

## 2023-11-17 RX ORDER — GUANFACINE 1 MG/1
1 TABLET, EXTENDED RELEASE ORAL NIGHTLY
Qty: 30 TABLET | Refills: 5 | Status: SHIPPED | OUTPATIENT
Start: 2023-11-17

## 2023-11-17 RX ORDER — GABAPENTIN 300 MG/1
300 CAPSULE ORAL 3 TIMES DAILY
Qty: 90 CAPSULE | Refills: 5 | Status: SHIPPED | OUTPATIENT
Start: 2023-11-17 | End: 2024-05-15

## 2023-11-26 RX ORDER — PANTOPRAZOLE SODIUM 40 MG/1
TABLET, DELAYED RELEASE ORAL
Qty: 30 TABLET | Refills: 5 | Status: SHIPPED | OUTPATIENT
Start: 2023-11-26

## 2023-11-29 RX ORDER — TIZANIDINE 4 MG/1
TABLET ORAL
Qty: 30 TABLET | Refills: 0 | OUTPATIENT
Start: 2023-11-29

## 2023-12-29 ENCOUNTER — OFFICE VISIT (OUTPATIENT)
Dept: FAMILY MEDICINE CLINIC | Age: 58
End: 2023-12-29
Payer: COMMERCIAL

## 2023-12-29 VITALS
SYSTOLIC BLOOD PRESSURE: 122 MMHG | DIASTOLIC BLOOD PRESSURE: 76 MMHG | TEMPERATURE: 98.1 F | OXYGEN SATURATION: 97 % | WEIGHT: 180.4 LBS | RESPIRATION RATE: 14 BRPM | HEART RATE: 91 BPM | BODY MASS INDEX: 34.09 KG/M2

## 2023-12-29 DIAGNOSIS — G43.109 MIGRAINE WITH AURA AND WITHOUT STATUS MIGRAINOSUS, NOT INTRACTABLE: ICD-10-CM

## 2023-12-29 DIAGNOSIS — R73.9 HYPERGLYCEMIA: ICD-10-CM

## 2023-12-29 DIAGNOSIS — F90.0 ATTENTION DEFICIT HYPERACTIVITY DISORDER (ADHD), PREDOMINANTLY INATTENTIVE TYPE: Primary | ICD-10-CM

## 2023-12-29 DIAGNOSIS — R79.89 ELEVATED TSH: ICD-10-CM

## 2023-12-29 PROCEDURE — 99214 OFFICE O/P EST MOD 30 MIN: CPT | Performed by: FAMILY MEDICINE

## 2023-12-29 RX ORDER — GUANFACINE 2 MG/1
2 TABLET, EXTENDED RELEASE ORAL DAILY
Qty: 30 TABLET | Refills: 5 | Status: SHIPPED | OUTPATIENT
Start: 2023-12-29

## 2024-01-11 ENCOUNTER — HOSPITAL ENCOUNTER (OUTPATIENT)
Dept: MAMMOGRAPHY | Age: 59
Discharge: HOME | End: 2024-01-11
Payer: COMMERCIAL

## 2024-01-11 VITALS — HEIGHT: 60 IN | BODY MASS INDEX: 35.14 KG/M2 | WEIGHT: 179 LBS

## 2024-01-11 DIAGNOSIS — F33.0 MAJOR DEPRESSIVE DISORDER, RECURRENT EPISODE, MILD (HCC): Primary | ICD-10-CM

## 2024-01-11 DIAGNOSIS — Z12.31 VISIT FOR SCREENING MAMMOGRAM: ICD-10-CM

## 2024-01-11 PROCEDURE — 77063 BREAST TOMOSYNTHESIS BI: CPT

## 2024-01-11 RX ORDER — ALPRAZOLAM 0.5 MG/1
TABLET ORAL
Qty: 30 TABLET | Refills: 0 | Status: SHIPPED | OUTPATIENT
Start: 2024-01-11 | End: 2024-02-10

## 2024-01-11 NOTE — TELEPHONE ENCOUNTER
Requested Prescriptions     Pending Prescriptions Disp Refills    ALPRAZolam (XANAX) 0.5 MG tablet [Pharmacy Med Name: ALPRAZOLAM 0.5MG TABLETS] 30 tablet 0     Sig: TAKE 1 TABLET BY MOUTH THREE TIMES DAILY AS NEEDED FOR SLEEP OR ANXIETY          Last Office Visit: 12/29/2023     Next Office Visit: Visit date not found     Last Labs: 11/17/23

## 2024-01-23 ENCOUNTER — OFFICE VISIT (OUTPATIENT)
Dept: FAMILY MEDICINE CLINIC | Age: 59
End: 2024-01-23
Payer: COMMERCIAL

## 2024-01-23 VITALS
RESPIRATION RATE: 14 BRPM | DIASTOLIC BLOOD PRESSURE: 68 MMHG | HEART RATE: 83 BPM | WEIGHT: 182 LBS | SYSTOLIC BLOOD PRESSURE: 116 MMHG | OXYGEN SATURATION: 96 % | BODY MASS INDEX: 35.54 KG/M2 | TEMPERATURE: 97.9 F

## 2024-01-23 DIAGNOSIS — R73.9 HYPERGLYCEMIA: Primary | ICD-10-CM

## 2024-01-23 DIAGNOSIS — M67.40 GANGLION CYST: ICD-10-CM

## 2024-01-23 DIAGNOSIS — M79.18 MYOFASCIAL PAIN: ICD-10-CM

## 2024-01-23 DIAGNOSIS — R68.2 DRY MOUTH: ICD-10-CM

## 2024-01-23 DIAGNOSIS — R79.89 ELEVATED TSH: ICD-10-CM

## 2024-01-23 DIAGNOSIS — F33.0 MAJOR DEPRESSIVE DISORDER, RECURRENT EPISODE, MILD (HCC): ICD-10-CM

## 2024-01-23 PROCEDURE — G8482 FLU IMMUNIZE ORDER/ADMIN: HCPCS | Performed by: FAMILY MEDICINE

## 2024-01-23 PROCEDURE — 99214 OFFICE O/P EST MOD 30 MIN: CPT | Performed by: FAMILY MEDICINE

## 2024-01-23 PROCEDURE — 3017F COLORECTAL CA SCREEN DOC REV: CPT | Performed by: FAMILY MEDICINE

## 2024-01-23 PROCEDURE — G8427 DOCREV CUR MEDS BY ELIG CLIN: HCPCS | Performed by: FAMILY MEDICINE

## 2024-01-23 PROCEDURE — G8417 CALC BMI ABV UP PARAM F/U: HCPCS | Performed by: FAMILY MEDICINE

## 2024-01-23 PROCEDURE — 1036F TOBACCO NON-USER: CPT | Performed by: FAMILY MEDICINE

## 2024-01-23 ASSESSMENT — PATIENT HEALTH QUESTIONNAIRE - PHQ9
1. LITTLE INTEREST OR PLEASURE IN DOING THINGS: 0
10. IF YOU CHECKED OFF ANY PROBLEMS, HOW DIFFICULT HAVE THESE PROBLEMS MADE IT FOR YOU TO DO YOUR WORK, TAKE CARE OF THINGS AT HOME, OR GET ALONG WITH OTHER PEOPLE: 0
2. FEELING DOWN, DEPRESSED OR HOPELESS: 0
7. TROUBLE CONCENTRATING ON THINGS, SUCH AS READING THE NEWSPAPER OR WATCHING TELEVISION: 0
SUM OF ALL RESPONSES TO PHQ9 QUESTIONS 1 & 2: 0
4. FEELING TIRED OR HAVING LITTLE ENERGY: 0
SUM OF ALL RESPONSES TO PHQ QUESTIONS 1-9: 0
8. MOVING OR SPEAKING SO SLOWLY THAT OTHER PEOPLE COULD HAVE NOTICED. OR THE OPPOSITE, BEING SO FIGETY OR RESTLESS THAT YOU HAVE BEEN MOVING AROUND A LOT MORE THAN USUAL: 0
9. THOUGHTS THAT YOU WOULD BE BETTER OFF DEAD, OR OF HURTING YOURSELF: 0
SUM OF ALL RESPONSES TO PHQ QUESTIONS 1-9: 0
SUM OF ALL RESPONSES TO PHQ QUESTIONS 1-9: 0
5. POOR APPETITE OR OVEREATING: 0
3. TROUBLE FALLING OR STAYING ASLEEP: 0
SUM OF ALL RESPONSES TO PHQ QUESTIONS 1-9: 0
6. FEELING BAD ABOUT YOURSELF - OR THAT YOU ARE A FAILURE OR HAVE LET YOURSELF OR YOUR FAMILY DOWN: 0

## 2024-01-23 NOTE — PROGRESS NOTES
results.   - Rheumatoid Factor; Future  - Cyclic Citrul Peptide Antibody, IgG; Future  - C-Reactive Protein; Future  - Sedimentation Rate; Future  - YAYA Reflex to Antibody Cascade; Future  - External Referral To Orthopedic Surgery  - Anti SSA; Future  - Anti SSB; Future    5. Ganglion cyst  R wrist  Refer ortho for eval and consideration of excision    6. Dry mouth  Check bloodwork to r/o CTD/sjogrens  Management pending results.   - Rheumatoid Factor; Future  - Cyclic Citrul Peptide Antibody, IgG; Future  - C-Reactive Protein; Future  - Sedimentation Rate; Future  - YAYA Reflex to Antibody Cascade; Future  - External Referral To Orthopedic Surgery  - Anti SSA; Future  - Anti SSB; Future           Follow-up appointment:   Pending bloodwork     Discussed use, benefit, and side effects of all prescribed medications.  Barriers to medication compliance addressed.  All patient questions answered.  Pt voiced understanding.  When applicable, patient's outside records were reviewed through [facility].  The patient has signed appropriate paperworks/consents.

## 2024-02-28 ENCOUNTER — TELEPHONE (OUTPATIENT)
Dept: FAMILY MEDICINE CLINIC | Age: 59
End: 2024-02-28
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
## 2024-02-28 NOTE — TELEPHONE ENCOUNTER
Pt states she needs the summary of the visit with the dx code on it and possibly the billing code. A letter with the dx code has been drafted and signed by Verito Ramirez advised I would get what I can and have it ready for her to  Friday.     All information is at my desk

## 2024-03-15 ENCOUNTER — OFFICE VISIT (OUTPATIENT)
Dept: FAMILY MEDICINE CLINIC | Age: 59
End: 2024-03-15
Payer: COMMERCIAL

## 2024-03-15 VITALS
BODY MASS INDEX: 35.39 KG/M2 | DIASTOLIC BLOOD PRESSURE: 60 MMHG | TEMPERATURE: 97.8 F | WEIGHT: 181.2 LBS | RESPIRATION RATE: 14 BRPM | HEART RATE: 97 BPM | OXYGEN SATURATION: 96 % | SYSTOLIC BLOOD PRESSURE: 106 MMHG

## 2024-03-15 DIAGNOSIS — E03.8 SUBCLINICAL HYPOTHYROIDISM: ICD-10-CM

## 2024-03-15 DIAGNOSIS — R79.89 ELEVATED TSH: ICD-10-CM

## 2024-03-15 DIAGNOSIS — R07.89 CHEST PRESSURE: ICD-10-CM

## 2024-03-15 DIAGNOSIS — F41.1 GAD (GENERALIZED ANXIETY DISORDER): ICD-10-CM

## 2024-03-15 DIAGNOSIS — F33.0 MAJOR DEPRESSIVE DISORDER, RECURRENT EPISODE, MILD (HCC): ICD-10-CM

## 2024-03-15 DIAGNOSIS — F90.0 ATTENTION DEFICIT HYPERACTIVITY DISORDER (ADHD), PREDOMINANTLY INATTENTIVE TYPE: ICD-10-CM

## 2024-03-15 DIAGNOSIS — M79.18 MYOFASCIAL PAIN: Primary | ICD-10-CM

## 2024-03-15 DIAGNOSIS — R41.3 MEMORY LOSS: ICD-10-CM

## 2024-03-15 PROCEDURE — G8482 FLU IMMUNIZE ORDER/ADMIN: HCPCS | Performed by: FAMILY MEDICINE

## 2024-03-15 PROCEDURE — 1036F TOBACCO NON-USER: CPT | Performed by: FAMILY MEDICINE

## 2024-03-15 PROCEDURE — 3017F COLORECTAL CA SCREEN DOC REV: CPT | Performed by: FAMILY MEDICINE

## 2024-03-15 PROCEDURE — G8427 DOCREV CUR MEDS BY ELIG CLIN: HCPCS | Performed by: FAMILY MEDICINE

## 2024-03-15 PROCEDURE — 93000 ELECTROCARDIOGRAM COMPLETE: CPT | Performed by: FAMILY MEDICINE

## 2024-03-15 PROCEDURE — 99214 OFFICE O/P EST MOD 30 MIN: CPT | Performed by: FAMILY MEDICINE

## 2024-03-15 PROCEDURE — G8417 CALC BMI ABV UP PARAM F/U: HCPCS | Performed by: FAMILY MEDICINE

## 2024-03-15 RX ORDER — ALPRAZOLAM 0.5 MG/1
TABLET ORAL
Qty: 30 TABLET | Refills: 2 | Status: SHIPPED | OUTPATIENT
Start: 2024-03-15 | End: 2024-06-13

## 2024-03-15 RX ORDER — LEVOTHYROXINE SODIUM 0.03 MG/1
25 TABLET ORAL DAILY
Qty: 90 TABLET | Refills: 1 | Status: SHIPPED | OUTPATIENT
Start: 2024-03-15

## 2024-03-15 RX ORDER — PANTOPRAZOLE SODIUM 40 MG/1
40 TABLET, DELAYED RELEASE ORAL
Qty: 30 TABLET | Refills: 5 | Status: SHIPPED | OUTPATIENT
Start: 2024-03-15

## 2024-03-15 SDOH — ECONOMIC STABILITY: FOOD INSECURITY: WITHIN THE PAST 12 MONTHS, YOU WORRIED THAT YOUR FOOD WOULD RUN OUT BEFORE YOU GOT MONEY TO BUY MORE.: NEVER TRUE

## 2024-03-15 SDOH — ECONOMIC STABILITY: FOOD INSECURITY: WITHIN THE PAST 12 MONTHS, THE FOOD YOU BOUGHT JUST DIDN'T LAST AND YOU DIDN'T HAVE MONEY TO GET MORE.: NEVER TRUE

## 2024-03-15 SDOH — ECONOMIC STABILITY: INCOME INSECURITY: HOW HARD IS IT FOR YOU TO PAY FOR THE VERY BASICS LIKE FOOD, HOUSING, MEDICAL CARE, AND HEATING?: NOT HARD AT ALL

## 2024-03-15 NOTE — PROGRESS NOTES
Here for f/u and recheck of mood, ADD, some joint pain issues    Pt was seen a few weeks ago and we gave her some orders to have bloodwork done for evaluation of possible connective tissue disease.  Pt has not had done yet    Pt is having some breakthru anxiety.  Pt is dealing with some moderate stressors at work as they continue to be short-staffed.  It has been a lot and has flared her anxiety.  Pt does have rx for xanax, and takes 1/2 daily prn.  Pt does find that it helps her.  Pt does not know if sx are worth discussing daily therapy.  Pt feels irritable, with some change in temperature.  Pt can forget words easily, and wonders if sx are related to thyroid     Pt getting intermittent chest pain sx.  Sx are intermittent, and will last for a few minutes.  It tends to be sharp.  Not related to activity and no issues with activity or exertion.        Except as noted above in the history of present illness, the review of systems is  negative for headache, vision changes, chest pain, shortness of breath, abdominal pain, urinary sx, bowel changes.    Past medical, surgical, and social history reviewed and updated  Medications and allergies reviewed and updated      Except as noted above in the history of present illness, the review of systems is  negative for headache, vision changes, chest pain, shortness of breath, abdominal pain, urinary sx, bowel changes.    Past medical, surgical, and social history reviewed and updated  Medications and allergies reviewed and updated      O: /60   Pulse 97   Temp 97.8 °F (36.6 °C) (Temporal)   Resp 14   Wt 82.2 kg (181 lb 3.2 oz)   SpO2 96%   BMI 35.39 kg/m²   GEN: No acute distress, cooperative, well nourished, alert.   HEENT: PEERLA, EOMI , normocephalic/atraumatic, nares and oropharynx clear.  Mucous membranes normal, Tympanic membranes clear bilaterally.  Neck: soft, supple, no thyromegaly, mass, no Lymphadenopathy  CV: Regular rate and rhythm, no murmur, rubs,

## 2024-03-17 NOTE — TELEPHONE ENCOUNTER
Patient saw Dr. Fred Hamilton on 05/31/2022 for tingling in her hands and feet. She got a letter for work; but she needs a new letter with more specifics. It would need to say that she cannot push or pull patients; or roll patients back and forth; or lift patients. It also needs for a certain timeline how long she will need to do this for. Her phone is [phone]    Thank you! There are no Wet Read(s) to document.

## 2024-04-09 ENCOUNTER — OFFICE VISIT (OUTPATIENT)
Dept: FAMILY MEDICINE CLINIC | Age: 59
End: 2024-04-09
Payer: COMMERCIAL

## 2024-04-09 VITALS
WEIGHT: 180 LBS | BODY MASS INDEX: 35.34 KG/M2 | DIASTOLIC BLOOD PRESSURE: 84 MMHG | HEIGHT: 60 IN | OXYGEN SATURATION: 94 % | TEMPERATURE: 98.2 F | HEART RATE: 108 BPM | SYSTOLIC BLOOD PRESSURE: 118 MMHG

## 2024-04-09 DIAGNOSIS — J01.00 ACUTE NON-RECURRENT MAXILLARY SINUSITIS: Primary | ICD-10-CM

## 2024-04-09 PROCEDURE — G8417 CALC BMI ABV UP PARAM F/U: HCPCS | Performed by: NURSE PRACTITIONER

## 2024-04-09 PROCEDURE — 1036F TOBACCO NON-USER: CPT | Performed by: NURSE PRACTITIONER

## 2024-04-09 PROCEDURE — 99213 OFFICE O/P EST LOW 20 MIN: CPT | Performed by: NURSE PRACTITIONER

## 2024-04-09 PROCEDURE — G8427 DOCREV CUR MEDS BY ELIG CLIN: HCPCS | Performed by: NURSE PRACTITIONER

## 2024-04-09 PROCEDURE — 3017F COLORECTAL CA SCREEN DOC REV: CPT | Performed by: NURSE PRACTITIONER

## 2024-04-09 RX ORDER — BENZONATATE 200 MG/1
200 CAPSULE ORAL 3 TIMES DAILY PRN
Qty: 30 CAPSULE | Refills: 0 | Status: SHIPPED | OUTPATIENT
Start: 2024-04-09 | End: 2024-04-16

## 2024-04-09 RX ORDER — METHYLPREDNISOLONE 4 MG/1
TABLET ORAL
Qty: 1 KIT | Refills: 0 | Status: SHIPPED | OUTPATIENT
Start: 2024-04-09 | End: 2024-04-15

## 2024-04-09 ASSESSMENT — ENCOUNTER SYMPTOMS
COLOR CHANGE: 0
BACK PAIN: 0
SORE THROAT: 0
COUGH: 1
CONSTIPATION: 0
ABDOMINAL PAIN: 0
DIARRHEA: 0
RHINORRHEA: 0
WHEEZING: 0
SINUS PAIN: 1
SHORTNESS OF BREATH: 0
SINUS PRESSURE: 1

## 2024-04-09 NOTE — PROGRESS NOTES
Alea Smith (:  1965) is a 58 y.o. female,Established patient, here for evaluation of the following chief complaint(s):  Headache, Congestion, and Sinus Problem      ASSESSMENT/PLAN:  1. Acute non-recurrent maxillary sinusitis  Assessment & Plan:  Ongoing x 3 days   Continue symptomatic care   Start medrol dose pack   Continue Mucinex DM   Tessalon prn   Call if no better       No follow-ups on file.    SUBJECTIVE/OBJECTIVE:  HPI  Here for concern of sinus pressure, headache, fatigue, and cough that started 3 days ago. Denies fever. No SOB or wheezing. She did take a Covid test at work and was negative. She has been taking Mucinex DM with little relief. Symptoms gradually worsening. No sick contacts.  She also reports general chest wall pain from coughing frequently. Cough is nonproductive.     Current Outpatient Medications   Medication Sig Dispense Refill    methylPREDNISolone (MEDROL DOSEPACK) 4 MG tablet Take by mouth. 1 kit 0    benzonatate (TESSALON) 200 MG capsule Take 1 capsule by mouth 3 times daily as needed for Cough 30 capsule 0    pantoprazole (PROTONIX) 40 MG tablet Take 1 tablet by mouth every morning (before breakfast) 30 tablet 5    ALPRAZolam (XANAX) 0.5 MG tablet TAKE 1 TABLET BY MOUTH THREE TIMES DAILY AS NEEDED FOR SLEEP OR ANXIETY 30 tablet 2    levothyroxine (SYNTHROID) 25 MCG tablet Take 1 tablet by mouth daily 90 tablet 1    guanFACINE (INTUNIV) 2 MG TB24 extended release tablet Take 1 tablet by mouth daily 30 tablet 5    gabapentin (NEURONTIN) 300 MG capsule Take 1 capsule by mouth 3 times daily for 180 days. Intended supply: 30 days 90 capsule 5    tiZANidine (ZANAFLEX) 4 MG tablet Take 1 tablet by mouth every 8 hours as needed (muscle spasm) 30 tablet 0    ciprofloxacin (CIPRO) 500 MG tablet TAKE 1 TABLET BY MOUTH DAILY SEXUAL ACTIVITY AS NEEDED (Patient not taking: Reported on 3/15/2024) 30 tablet 2     No current facility-administered medications for this visit.       Review

## 2024-04-25 ENCOUNTER — OFFICE VISIT (OUTPATIENT)
Dept: FAMILY MEDICINE CLINIC | Age: 59
End: 2024-04-25
Payer: COMMERCIAL

## 2024-04-25 VITALS
BODY MASS INDEX: 36.32 KG/M2 | HEART RATE: 79 BPM | TEMPERATURE: 98.4 F | HEIGHT: 60 IN | OXYGEN SATURATION: 97 % | WEIGHT: 185 LBS | SYSTOLIC BLOOD PRESSURE: 122 MMHG | DIASTOLIC BLOOD PRESSURE: 72 MMHG

## 2024-04-25 DIAGNOSIS — J01.90 ACUTE BACTERIAL SINUSITIS: Primary | ICD-10-CM

## 2024-04-25 DIAGNOSIS — B96.89 ACUTE BACTERIAL SINUSITIS: Primary | ICD-10-CM

## 2024-04-25 PROCEDURE — G8427 DOCREV CUR MEDS BY ELIG CLIN: HCPCS | Performed by: NURSE PRACTITIONER

## 2024-04-25 PROCEDURE — 3017F COLORECTAL CA SCREEN DOC REV: CPT | Performed by: NURSE PRACTITIONER

## 2024-04-25 PROCEDURE — 1036F TOBACCO NON-USER: CPT | Performed by: NURSE PRACTITIONER

## 2024-04-25 PROCEDURE — G8417 CALC BMI ABV UP PARAM F/U: HCPCS | Performed by: NURSE PRACTITIONER

## 2024-04-25 PROCEDURE — 99214 OFFICE O/P EST MOD 30 MIN: CPT | Performed by: NURSE PRACTITIONER

## 2024-04-25 RX ORDER — AMOXICILLIN AND CLAVULANATE POTASSIUM 875; 125 MG/1; MG/1
1 TABLET, FILM COATED ORAL 2 TIMES DAILY
Qty: 14 TABLET | Refills: 0 | Status: SHIPPED | OUTPATIENT
Start: 2024-04-25 | End: 2024-05-02

## 2024-04-25 RX ORDER — DEXTROMETHORPHAN HYDROBROMIDE AND PROMETHAZINE HYDROCHLORIDE 15; 6.25 MG/5ML; MG/5ML
5 SYRUP ORAL 4 TIMES DAILY PRN
Qty: 240 ML | Refills: 0 | Status: SHIPPED | OUTPATIENT
Start: 2024-04-25

## 2024-04-25 ASSESSMENT — ENCOUNTER SYMPTOMS
COLOR CHANGE: 0
DIARRHEA: 0
VOMITING: 0
SINUS PAIN: 1
COUGH: 1
EYE ITCHING: 0
SINUS PRESSURE: 1
RHINORRHEA: 0
BLOOD IN STOOL: 0
SHORTNESS OF BREATH: 0
ABDOMINAL PAIN: 0
CHEST TIGHTNESS: 0
CONSTIPATION: 0
NAUSEA: 0
EYE REDNESS: 0
WHEEZING: 0
SORE THROAT: 0
BACK PAIN: 0

## 2024-04-25 NOTE — PROGRESS NOTES
No maxillary sinus tenderness or frontal sinus tenderness.      Mouth/Throat:      Tonsils: No tonsillar abscesses.   Eyes:      Extraocular Movements: Extraocular movements intact.      Pupils: Pupils are equal, round, and reactive to light.   Cardiovascular:      Rate and Rhythm: Normal rate and regular rhythm.      Pulses: Normal pulses.      Heart sounds: Normal heart sounds.   Pulmonary:      Effort: Pulmonary effort is normal.      Breath sounds: Normal breath sounds.   Lymphadenopathy:      Head:      Right side of head: No submental, submandibular, tonsillar, preauricular, posterior auricular or occipital adenopathy.      Left side of head: No submental, submandibular, tonsillar, preauricular, posterior auricular or occipital adenopathy.   Skin:     General: Skin is warm and dry.   Neurological:      Mental Status: She is alert.   Psychiatric:         Mood and Affect: Mood normal.         Behavior: Behavior normal.                 An electronic signature was used to authenticate this note.    --Celestina Varner, MURRAY - CNP

## 2024-04-25 NOTE — ASSESSMENT & PLAN NOTE
Will start patient on Augmentin. Continue with symptomatic management, increased water intake, saline irrigation, as well as mucolytics and antihistamines as needed. Patient advised to call back if no improvement.   Phenergan dm

## 2024-05-06 DIAGNOSIS — M79.18 MYOFASCIAL PAIN: ICD-10-CM

## 2024-05-06 DIAGNOSIS — E03.8 SUBCLINICAL HYPOTHYROIDISM: ICD-10-CM

## 2024-05-06 DIAGNOSIS — R68.2 DRY MOUTH: ICD-10-CM

## 2024-05-06 DIAGNOSIS — R79.89 ELEVATED TSH: ICD-10-CM

## 2024-05-06 LAB — ERYTHROCYTE [SEDIMENTATION RATE] IN BLOOD BY WESTERGREN METHOD: 32 MM/HR (ref 0–30)

## 2024-05-07 LAB
ANA SER QL IA: NEGATIVE
CCP IGG SERPL-ACNC: <0.5 U/ML (ref 0–2.9)
CRP SERPL-MCNC: 15.1 MG/L (ref 0–5.1)
ENA SS-A AB SER IA-ACNC: <0.2 AI (ref 0–0.9)
ENA SS-B AB SER IA-ACNC: <0.2 AI (ref 0–0.9)
RHEUMATOID FACT SER IA-ACNC: <10 IU/ML
T4 FREE SERPL-MCNC: 1 NG/DL (ref 0.9–1.8)
TSH SERPL DL<=0.005 MIU/L-ACNC: 5.04 UIU/ML (ref 0.27–4.2)

## 2024-05-10 ENCOUNTER — OFFICE VISIT (OUTPATIENT)
Dept: FAMILY MEDICINE CLINIC | Age: 59
End: 2024-05-10
Payer: COMMERCIAL

## 2024-05-10 VITALS
HEIGHT: 60 IN | HEART RATE: 81 BPM | SYSTOLIC BLOOD PRESSURE: 116 MMHG | OXYGEN SATURATION: 96 % | WEIGHT: 183 LBS | DIASTOLIC BLOOD PRESSURE: 83 MMHG | BODY MASS INDEX: 35.93 KG/M2

## 2024-05-10 DIAGNOSIS — E03.8 SUBCLINICAL HYPOTHYROIDISM: ICD-10-CM

## 2024-05-10 DIAGNOSIS — F41.8 DEPRESSION WITH ANXIETY: ICD-10-CM

## 2024-05-10 DIAGNOSIS — F90.0 ATTENTION DEFICIT HYPERACTIVITY DISORDER (ADHD), PREDOMINANTLY INATTENTIVE TYPE: ICD-10-CM

## 2024-05-10 DIAGNOSIS — M79.18 MYOFASCIAL PAIN: Primary | ICD-10-CM

## 2024-05-10 DIAGNOSIS — H90.3 SENSORINEURAL HEARING LOSS (SNHL) OF BOTH EARS: ICD-10-CM

## 2024-05-10 DIAGNOSIS — R70.0 ELEVATED ERYTHROCYTE SEDIMENTATION RATE: ICD-10-CM

## 2024-05-10 DIAGNOSIS — R73.9 HYPERGLYCEMIA: ICD-10-CM

## 2024-05-10 DIAGNOSIS — F33.0 MAJOR DEPRESSIVE DISORDER, RECURRENT EPISODE, MILD (HCC): ICD-10-CM

## 2024-05-10 PROBLEM — J01.90 ACUTE BACTERIAL SINUSITIS: Status: RESOLVED | Noted: 2024-04-25 | Resolved: 2024-05-10

## 2024-05-10 PROBLEM — B96.89 ACUTE BACTERIAL SINUSITIS: Status: RESOLVED | Noted: 2024-04-25 | Resolved: 2024-05-10

## 2024-05-10 PROBLEM — J01.00 ACUTE NON-RECURRENT MAXILLARY SINUSITIS: Status: RESOLVED | Noted: 2024-04-09 | Resolved: 2024-05-10

## 2024-05-10 PROCEDURE — G2211 COMPLEX E/M VISIT ADD ON: HCPCS | Performed by: FAMILY MEDICINE

## 2024-05-10 PROCEDURE — 99214 OFFICE O/P EST MOD 30 MIN: CPT | Performed by: FAMILY MEDICINE

## 2024-05-10 PROCEDURE — G8417 CALC BMI ABV UP PARAM F/U: HCPCS | Performed by: FAMILY MEDICINE

## 2024-05-10 PROCEDURE — 1036F TOBACCO NON-USER: CPT | Performed by: FAMILY MEDICINE

## 2024-05-10 PROCEDURE — 3017F COLORECTAL CA SCREEN DOC REV: CPT | Performed by: FAMILY MEDICINE

## 2024-05-10 PROCEDURE — G8427 DOCREV CUR MEDS BY ELIG CLIN: HCPCS | Performed by: FAMILY MEDICINE

## 2024-05-10 RX ORDER — LEVOTHYROXINE SODIUM 0.05 MG/1
50 TABLET ORAL DAILY
Qty: 90 TABLET | Refills: 1 | Status: SHIPPED | OUTPATIENT
Start: 2024-05-10

## 2024-05-10 RX ORDER — ALPRAZOLAM 0.5 MG/1
TABLET ORAL
Qty: 30 TABLET | Refills: 2 | Status: SHIPPED | OUTPATIENT
Start: 2024-05-10 | End: 2024-08-08

## 2024-05-10 RX ORDER — GUANFACINE 3 MG/1
3 TABLET, EXTENDED RELEASE ORAL DAILY
Qty: 90 TABLET | Refills: 1 | Status: SHIPPED | OUTPATIENT
Start: 2024-05-10

## 2024-05-10 NOTE — PROGRESS NOTES
Here for f/u and recheck after some recent bloodwork showing some mild elevation of ESR, CRP.  Pts remainder of bloodwork was negative, including RF, YAYA, CCP.  Pt does have chronic myofascial pain issues     Pt here for follow up of blood pressure.  Pt states doing great with adherence to therapy and feels well.  No issues of chest pain, shortness of breath.  No vision changes, headache, swelling in legs.     Here for f/u of thyroid.  Pt is adherent to medication therapy, and denies any symptoms of over or undertreatment of thyroid.  No palpiations, dizziness, tachycardia, skin changes, bowel changes, fatigue or lightheadedness.  Pt does feel better on the med, mood is improved and     Here for follow up of ADD.  Pt states that they are doing very well with current therapy and feels that control of symptoms are adequate at this time.  No breakthru symptoms and no need/indication for adjustment in therapy.  Taking meds as prescribed and denies any illicit medication usage of misuse of their stimulant thearpy.  Mood is stable and denies any issues of depression or anxiety associated with treatment of ADD.       Except as noted above in the history of present illness, the review of systems is  negative for headache, vision changes, chest pain, shortness of breath, abdominal pain, urinary sx, bowel changes.    Past medical, surgical, and social history reviewed and updated  Medications and allergies reviewed and updated        O: /83   Pulse 81   Ht 1.524 m (5')   Wt 83 kg (183 lb)   SpO2 96%   BMI 35.74 kg/m²   GEN: No acute distress, cooperative, well nourished, alert.   HEENT: PEERLA, EOMI , normocephalic/atraumatic, nares and oropharynx clear.  Mucous membranes normal, Tympanic membranes clear bilaterally.  Neck: soft, supple, no thyromegaly, mass, no Lymphadenopathy  CV: Regular rate and rhythm, no murmur, rubs, gallops. No edema.  Resp: Clear to auscultation bilaterally good air entry bilaterally  No

## 2024-06-24 ENCOUNTER — OFFICE VISIT (OUTPATIENT)
Dept: FAMILY MEDICINE CLINIC | Age: 59
End: 2024-06-24
Payer: COMMERCIAL

## 2024-06-24 VITALS
OXYGEN SATURATION: 99 % | SYSTOLIC BLOOD PRESSURE: 114 MMHG | HEART RATE: 94 BPM | HEIGHT: 60 IN | WEIGHT: 182 LBS | BODY MASS INDEX: 35.73 KG/M2 | DIASTOLIC BLOOD PRESSURE: 68 MMHG

## 2024-06-24 DIAGNOSIS — M79.18 MYOFASCIAL PAIN: ICD-10-CM

## 2024-06-24 DIAGNOSIS — M79.602 LEFT ARM PAIN: Primary | ICD-10-CM

## 2024-06-24 DIAGNOSIS — M67.40 GANGLION CYST: ICD-10-CM

## 2024-06-24 DIAGNOSIS — E03.8 SUBCLINICAL HYPOTHYROIDISM: ICD-10-CM

## 2024-06-24 DIAGNOSIS — F33.0 MAJOR DEPRESSIVE DISORDER, RECURRENT EPISODE, MILD (HCC): ICD-10-CM

## 2024-06-24 DIAGNOSIS — L82.1 SK (SEBORRHEIC KERATOSIS): ICD-10-CM

## 2024-06-24 DIAGNOSIS — F90.0 ATTENTION DEFICIT HYPERACTIVITY DISORDER (ADHD), PREDOMINANTLY INATTENTIVE TYPE: ICD-10-CM

## 2024-06-24 DIAGNOSIS — R45.4 IRRITABILITY: ICD-10-CM

## 2024-06-24 DIAGNOSIS — K59.09 CHRONIC CONSTIPATION: ICD-10-CM

## 2024-06-24 PROCEDURE — G2211 COMPLEX E/M VISIT ADD ON: HCPCS | Performed by: FAMILY MEDICINE

## 2024-06-24 PROCEDURE — 3017F COLORECTAL CA SCREEN DOC REV: CPT | Performed by: FAMILY MEDICINE

## 2024-06-24 PROCEDURE — 1036F TOBACCO NON-USER: CPT | Performed by: FAMILY MEDICINE

## 2024-06-24 PROCEDURE — 99214 OFFICE O/P EST MOD 30 MIN: CPT | Performed by: FAMILY MEDICINE

## 2024-06-24 PROCEDURE — G8417 CALC BMI ABV UP PARAM F/U: HCPCS | Performed by: FAMILY MEDICINE

## 2024-06-24 PROCEDURE — G8427 DOCREV CUR MEDS BY ELIG CLIN: HCPCS | Performed by: FAMILY MEDICINE

## 2024-06-24 RX ORDER — FLUOXETINE HYDROCHLORIDE 20 MG/1
20 CAPSULE ORAL DAILY
Qty: 30 CAPSULE | Refills: 5 | Status: SHIPPED | OUTPATIENT
Start: 2024-06-24

## 2024-06-24 NOTE — PROGRESS NOTES
membranes normal, Tympanic membranes clear bilaterally.  Neck: soft, supple, no thyromegaly, mass, no Lymphadenopathy  CV: Regular rate and rhythm, no murmur, rubs, gallops. No edema.  Resp: Clear to auscultation bilaterally good air entry bilaterally  No crackles, wheeze. Breathing comfortably.   Psych: mood stable, No suicidal thoughts or ideation   Musc: full range of motion bilateral upper extremities.  full range of motion LUE w/o tender to palpation, no mass, lesions.  Strength 5/5 bilateral upper extremities  R hand with soft tissue nodule on dorsum of hand,       Current Outpatient Medications   Medication Sig Dispense Refill    FLUoxetine (PROZAC) 20 MG capsule Take 1 capsule by mouth daily 30 capsule 5    ALPRAZolam (XANAX) 0.5 MG tablet TAKE 1 TABLET BY MOUTH THREE TIMES DAILY AS NEEDED FOR SLEEP OR ANXIETY 30 tablet 2    guanFACINE (INTUNIV) 3 MG TB24 tablet Take 1 tablet by mouth daily 90 tablet 1    levothyroxine (SYNTHROID) 50 MCG tablet Take 1 tablet by mouth daily 90 tablet 1    pantoprazole (PROTONIX) 40 MG tablet Take 1 tablet by mouth every morning (before breakfast) 30 tablet 5    gabapentin (NEURONTIN) 300 MG capsule Take 1 capsule by mouth 3 times daily for 180 days. Intended supply: 30 days 90 capsule 5    tiZANidine (ZANAFLEX) 4 MG tablet Take 1 tablet by mouth every 8 hours as needed (muscle spasm) 30 tablet 0    ciprofloxacin (CIPRO) 500 MG tablet TAKE 1 TABLET BY MOUTH DAILY SEXUAL ACTIVITY AS NEEDED 30 tablet 2     No current facility-administered medications for this visit.         ASSESSMENT / PLAN:    1. Left arm pain  Resolved  Exam nonfocal  The patient is reassured that these symptoms do not appear to represent a serious or threatening condition.  NSAIDS prn    2. Major depressive disorder, recurrent episode, mild (HCC)  No ST/SI, concernf or some increased irritability  Add trial prozac 20mg qd  Discussed use, benefit, and side effects of prescribed medications.  Barriers to

## 2024-07-22 ENCOUNTER — OFFICE VISIT (OUTPATIENT)
Dept: FAMILY MEDICINE CLINIC | Age: 59
End: 2024-07-22
Payer: COMMERCIAL

## 2024-07-22 VITALS
TEMPERATURE: 97.8 F | BODY MASS INDEX: 35.66 KG/M2 | WEIGHT: 182.6 LBS | HEART RATE: 80 BPM | RESPIRATION RATE: 16 BRPM | OXYGEN SATURATION: 96 % | DIASTOLIC BLOOD PRESSURE: 64 MMHG | SYSTOLIC BLOOD PRESSURE: 102 MMHG

## 2024-07-22 DIAGNOSIS — R45.4 IRRITABILITY: ICD-10-CM

## 2024-07-22 DIAGNOSIS — M67.40 GANGLION CYST: Primary | ICD-10-CM

## 2024-07-22 DIAGNOSIS — F51.01 PRIMARY INSOMNIA: ICD-10-CM

## 2024-07-22 DIAGNOSIS — R73.9 HYPERGLYCEMIA: ICD-10-CM

## 2024-07-22 DIAGNOSIS — M79.18 MYOFASCIAL PAIN: ICD-10-CM

## 2024-07-22 DIAGNOSIS — E03.9 ACQUIRED HYPOTHYROIDISM: ICD-10-CM

## 2024-07-22 DIAGNOSIS — F33.0 MAJOR DEPRESSIVE DISORDER, RECURRENT EPISODE, MILD (HCC): ICD-10-CM

## 2024-07-22 PROBLEM — E03.8 SUBCLINICAL HYPOTHYROIDISM: Status: RESOLVED | Noted: 2024-03-15 | Resolved: 2024-07-22

## 2024-07-22 PROCEDURE — 99214 OFFICE O/P EST MOD 30 MIN: CPT | Performed by: FAMILY MEDICINE

## 2024-07-22 PROCEDURE — 1036F TOBACCO NON-USER: CPT | Performed by: FAMILY MEDICINE

## 2024-07-22 PROCEDURE — G2211 COMPLEX E/M VISIT ADD ON: HCPCS | Performed by: FAMILY MEDICINE

## 2024-07-22 PROCEDURE — G8427 DOCREV CUR MEDS BY ELIG CLIN: HCPCS | Performed by: FAMILY MEDICINE

## 2024-07-22 PROCEDURE — G8417 CALC BMI ABV UP PARAM F/U: HCPCS | Performed by: FAMILY MEDICINE

## 2024-07-22 PROCEDURE — 3017F COLORECTAL CA SCREEN DOC REV: CPT | Performed by: FAMILY MEDICINE

## 2024-07-22 RX ORDER — ALPRAZOLAM 0.5 MG/1
TABLET ORAL
Qty: 30 TABLET | Refills: 2 | Status: SHIPPED | OUTPATIENT
Start: 2024-07-22 | End: 2024-10-20

## 2024-07-22 NOTE — PROGRESS NOTES
of all prescribed medications.  Barriers to medication compliance addressed.  All patient questions answered.  Pt voiced understanding.  When applicable, patient's outside records were reviewed through Care Everywhere.  The patient has signed appropriate paperworks/consents.

## 2024-08-19 RX ORDER — LEVOTHYROXINE SODIUM 0.05 MG/1
50 TABLET ORAL DAILY
Qty: 90 TABLET | Refills: 1 | Status: SHIPPED | OUTPATIENT
Start: 2024-08-19

## 2024-08-19 NOTE — TELEPHONE ENCOUNTER
Requested Prescriptions     Pending Prescriptions Disp Refills    levothyroxine (SYNTHROID) 50 MCG tablet [Pharmacy Med Name: LEVOTHYROXINE 0.05MG (50MCG) TAB] 90 tablet 1     Sig: TAKE 1 TABLET BY MOUTH DAILY          Last Office Visit: 7/22/2024     Next Office Visit: 10/22/2024

## 2024-09-05 ENCOUNTER — OFFICE VISIT (OUTPATIENT)
Dept: FAMILY MEDICINE CLINIC | Age: 59
End: 2024-09-05

## 2024-09-05 VITALS
DIASTOLIC BLOOD PRESSURE: 70 MMHG | OXYGEN SATURATION: 98 % | SYSTOLIC BLOOD PRESSURE: 110 MMHG | BODY MASS INDEX: 35.34 KG/M2 | WEIGHT: 180 LBS | HEIGHT: 60 IN | HEART RATE: 85 BPM | TEMPERATURE: 98 F

## 2024-09-05 DIAGNOSIS — J01.90 ACUTE BACTERIAL SINUSITIS: ICD-10-CM

## 2024-09-05 DIAGNOSIS — M18.11 DEGENERATIVE ARTHRITIS OF THUMB, RIGHT: ICD-10-CM

## 2024-09-05 DIAGNOSIS — J06.9 ACUTE URI: ICD-10-CM

## 2024-09-05 DIAGNOSIS — M67.40 GANGLION CYST: ICD-10-CM

## 2024-09-05 DIAGNOSIS — E03.9 ACQUIRED HYPOTHYROIDISM: ICD-10-CM

## 2024-09-05 DIAGNOSIS — Z01.811 PRE-OP CHEST EXAM: Primary | ICD-10-CM

## 2024-09-05 DIAGNOSIS — B96.89 ACUTE BACTERIAL SINUSITIS: ICD-10-CM

## 2024-09-05 RX ORDER — HYDROCODONE POLISTIREX AND CHLORPHENIRAMINE POLISTIREX 10; 8 MG/5ML; MG/5ML
5 SUSPENSION, EXTENDED RELEASE ORAL EVERY 12 HOURS PRN
Qty: 70 ML | Refills: 0 | Status: SHIPPED | OUTPATIENT
Start: 2024-09-05 | End: 2024-09-12

## 2024-09-05 RX ORDER — CIPROFLOXACIN 500 MG/1
TABLET, FILM COATED ORAL
Qty: 30 TABLET | Refills: 2 | Status: SHIPPED | OUTPATIENT
Start: 2024-09-05

## 2024-09-05 NOTE — PROGRESS NOTES
Subjective:    Chief Complaint:     Alea Smith is a 58 y.o. female who presents for a preoperative physical examination.  She is scheduled to have R hand ganglion cyst removal done by Dr. Denise at [facility] on 2024.  Pt has had chronic issues with large ganglion cyst on R wrist that has increased in size and does have some discomfort    Pt does have loop recorder placed several years ago for issues of palpitations and due to presence of loop recorder, she needs to see cardiologist.  Pt will see [facility] (dr. Jacobson).    Pt has had some mild URI sx, present for about 4-5d.  Sx with nasal congestion, sinus pressure and some PND.  Now with drainage into chest, with chest congestion and cough.  No fever, chills.  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  Pt taking nothing for this currently, did try tylenol sinus and mucinex.  Did have tessalon perles but not helpful.          History of Present Illness:          Past Medical History:   Diagnosis Date    Acid reflux     Acquired cyst of kidney     Acquired hypothyroidism 2024    ADHD (attention deficit hyperactivity disorder) 10/01/2013    DDD (degenerative disc disease), cervical 2021    DDD (degenerative disc disease), lumbar 2013    Depression with anxiety 03/10/2021    Depressive disorder, not elsewhere classified 2010    Hiatal hernia 2016    History of loop recorder placed 2 years ago    Hyperglycemia 2021    Irritable bowel syndrome 2010    Migraine, unspecified, without mention of intractable migraine without mention of status migrainosus 2010    Palpitations 2010    Pancreatitis     Pneumonia childhood    Reflux esophagitis 2010        Review of patient's past surgical history indicates:     Past Surgical History:   Procedure Laterality Date    ABLATION OF DYSRHYTHMIC FOCUS      APPENDECTOMY       SECTION      COLONOSCOPY  2018    COLONOSCOPY

## 2024-09-19 RX ORDER — PANTOPRAZOLE SODIUM 40 MG/1
40 TABLET, DELAYED RELEASE ORAL
Qty: 30 TABLET | Refills: 5 | Status: SHIPPED | OUTPATIENT
Start: 2024-09-19

## 2024-10-22 ENCOUNTER — OFFICE VISIT (OUTPATIENT)
Dept: FAMILY MEDICINE CLINIC | Age: 59
End: 2024-10-22
Payer: COMMERCIAL

## 2024-10-22 VITALS
WEIGHT: 183.8 LBS | HEART RATE: 69 BPM | OXYGEN SATURATION: 98 % | RESPIRATION RATE: 14 BRPM | DIASTOLIC BLOOD PRESSURE: 64 MMHG | SYSTOLIC BLOOD PRESSURE: 110 MMHG | TEMPERATURE: 98.7 F | BODY MASS INDEX: 35.9 KG/M2

## 2024-10-22 DIAGNOSIS — R73.9 HYPERGLYCEMIA: ICD-10-CM

## 2024-10-22 DIAGNOSIS — R10.9 LEFT FLANK PAIN: ICD-10-CM

## 2024-10-22 DIAGNOSIS — F33.0 MAJOR DEPRESSIVE DISORDER, RECURRENT EPISODE, MILD (HCC): ICD-10-CM

## 2024-10-22 DIAGNOSIS — F90.0 ATTENTION DEFICIT HYPERACTIVITY DISORDER (ADHD), PREDOMINANTLY INATTENTIVE TYPE: Primary | ICD-10-CM

## 2024-10-22 DIAGNOSIS — M25.561 ACUTE PAIN OF RIGHT KNEE: ICD-10-CM

## 2024-10-22 DIAGNOSIS — M79.18 MYOFASCIAL PAIN: ICD-10-CM

## 2024-10-22 DIAGNOSIS — Z00.00 ROUTINE GENERAL MEDICAL EXAMINATION AT A HEALTH CARE FACILITY: ICD-10-CM

## 2024-10-22 DIAGNOSIS — E03.9 ACQUIRED HYPOTHYROIDISM: ICD-10-CM

## 2024-10-22 DIAGNOSIS — F41.8 DEPRESSION WITH ANXIETY: ICD-10-CM

## 2024-10-22 PROCEDURE — 3017F COLORECTAL CA SCREEN DOC REV: CPT | Performed by: FAMILY MEDICINE

## 2024-10-22 PROCEDURE — G2211 COMPLEX E/M VISIT ADD ON: HCPCS | Performed by: FAMILY MEDICINE

## 2024-10-22 PROCEDURE — 99214 OFFICE O/P EST MOD 30 MIN: CPT | Performed by: FAMILY MEDICINE

## 2024-10-22 PROCEDURE — G8417 CALC BMI ABV UP PARAM F/U: HCPCS | Performed by: FAMILY MEDICINE

## 2024-10-22 PROCEDURE — 1036F TOBACCO NON-USER: CPT | Performed by: FAMILY MEDICINE

## 2024-10-22 PROCEDURE — G8484 FLU IMMUNIZE NO ADMIN: HCPCS | Performed by: FAMILY MEDICINE

## 2024-10-22 PROCEDURE — G8427 DOCREV CUR MEDS BY ELIG CLIN: HCPCS | Performed by: FAMILY MEDICINE

## 2024-10-22 RX ORDER — GUANFACINE 3 MG/1
3 TABLET, EXTENDED RELEASE ORAL DAILY
Qty: 90 TABLET | Refills: 1 | Status: SHIPPED | OUTPATIENT
Start: 2024-10-22

## 2024-10-22 RX ORDER — ALPRAZOLAM 0.5 MG
TABLET ORAL
Qty: 30 TABLET | Refills: 2 | Status: SHIPPED | OUTPATIENT
Start: 2024-10-22 | End: 2025-01-20

## 2024-10-22 RX ORDER — PANTOPRAZOLE SODIUM 40 MG/1
40 TABLET, DELAYED RELEASE ORAL
Qty: 30 TABLET | Refills: 5 | Status: SHIPPED | OUTPATIENT
Start: 2024-10-22

## 2024-10-22 RX ORDER — GABAPENTIN 300 MG/1
300 CAPSULE ORAL 3 TIMES DAILY
Qty: 90 CAPSULE | Refills: 5 | Status: SHIPPED | OUTPATIENT
Start: 2024-10-22 | End: 2025-04-20

## 2024-10-22 NOTE — PROGRESS NOTES
Take 1 tablet by mouth every 8 hours as needed (muscle spasm) 30 tablet 0    ALPRAZolam (XANAX) 0.5 MG tablet TAKE 1 TABLET BY MOUTH THREE TIMES DAILY AS NEEDED FOR SLEEP OR ANXIETY 30 tablet 2    guanFACINE HCl ER (INTUNIV) 3 MG TB24 tablet Take 1 tablet by mouth daily 90 tablet 1    pantoprazole (PROTONIX) 40 MG tablet Take 1 tablet by mouth every morning (before breakfast) 30 tablet 5    ciprofloxacin (CIPRO) 500 MG tablet TAKE 1 TABLET BY MOUTH DAILY AS NEEDED prn SEXUAL ACTIVITY 30 tablet 2    levothyroxine (SYNTHROID) 50 MCG tablet TAKE 1 TABLET BY MOUTH DAILY 90 tablet 1    FLUoxetine (PROZAC) 20 MG capsule Take 1 capsule by mouth daily 30 capsule 5     No current facility-administered medications for this visit.         ASSESSMENT / PLAN:    1. Major depressive disorder, recurrent episode, mild (HCC)  Doing well with prozac at 20mg qd  Continue present management   Cont prn xanax, use intermittent  See [facility]  Pt aware of need for every 3 month medication followup appointments, and that medication refills for benzodiazepines, narcotics and/or stimulants will only be given at appointment.   refills given   - ALPRAZolam (XANAX) 0.5 MG tablet; TAKE 1 TABLET BY MOUTH THREE TIMES DAILY AS NEEDED FOR SLEEP OR ANXIETY  Dispense: 30 tablet; Refill: 2    2. Attention deficit hyperactivity disorder (ADHD), predominantly inattentive type  Stable with guafacine  Off stimulants    3. Acquired hypothyroidism  Stable with levothyroxine supplementation  Check bloodwork   - TSH; Future  - T4, Free; Future    4. Depression with anxiety  Mood stable, controlled with fluoxetine at 20mg qd    5. Hyperglycemia  - Hemoglobin A1C; Future    6. Myofascial pain  Cont gabapentin and monitor    7. Acute pain of right knee  Exam nonfocal  Suspect bakers cyst  Monitor with range of motion exercises  Check xray  Management pending results.   - XR KNEE RIGHT (3 VIEWS); Future    8. Left flank pain  - Urinalysis; Future    9. Routine general

## 2024-10-29 RX ORDER — GUANFACINE 3 MG/1
3 TABLET, EXTENDED RELEASE ORAL DAILY
Qty: 90 TABLET | Refills: 1 | OUTPATIENT
Start: 2024-10-29

## 2024-10-29 NOTE — TELEPHONE ENCOUNTER
Medication:   Requested Prescriptions     Pending Prescriptions Disp Refills    guanFACINE HCl ER (INTUNIV) 3 MG TB24 tablet [Pharmacy Med Name: GUANFACINE ER 3MG TABLETS] 90 tablet 1     Sig: TAKE 1 TABLET BY MOUTH DAILY     Last Filled:  10/22/24    Last appt: 10/22/2024   Next appt: 11/19/2024    Last OARRS:       7/22/2024     3:59 PM   RX Monitoring   Periodic Controlled Substance Monitoring Possible medication side effects, risk of tolerance/dependence & alternative treatments discussed.;No signs of potential drug abuse or diversion identified.;Assessed functional status (ability to engage in work or other purposeful activities, the pain intensity and its interference with activities of daily living, quality of family life and social activities, and the physical activity)

## 2024-11-18 DIAGNOSIS — Z00.00 ROUTINE GENERAL MEDICAL EXAMINATION AT A HEALTH CARE FACILITY: ICD-10-CM

## 2024-11-18 DIAGNOSIS — R10.9 LEFT FLANK PAIN: ICD-10-CM

## 2024-11-18 DIAGNOSIS — R73.9 HYPERGLYCEMIA: ICD-10-CM

## 2024-11-18 DIAGNOSIS — E03.9 ACQUIRED HYPOTHYROIDISM: ICD-10-CM

## 2024-11-19 ENCOUNTER — OFFICE VISIT (OUTPATIENT)
Dept: FAMILY MEDICINE CLINIC | Age: 59
End: 2024-11-19

## 2024-11-19 VITALS
WEIGHT: 182.2 LBS | BODY MASS INDEX: 35.77 KG/M2 | HEIGHT: 60 IN | TEMPERATURE: 96.4 F | HEART RATE: 87 BPM | DIASTOLIC BLOOD PRESSURE: 82 MMHG | OXYGEN SATURATION: 97 % | SYSTOLIC BLOOD PRESSURE: 126 MMHG

## 2024-11-19 DIAGNOSIS — G89.29 CHRONIC PAIN OF RIGHT KNEE: ICD-10-CM

## 2024-11-19 DIAGNOSIS — E66.812 CLASS 2 OBESITY WITHOUT SERIOUS COMORBIDITY WITH BODY MASS INDEX (BMI) OF 35.0 TO 35.9 IN ADULT, UNSPECIFIED OBESITY TYPE: ICD-10-CM

## 2024-11-19 DIAGNOSIS — F90.0 ATTENTION DEFICIT HYPERACTIVITY DISORDER (ADHD), PREDOMINANTLY INATTENTIVE TYPE: ICD-10-CM

## 2024-11-19 DIAGNOSIS — Z00.00 ROUTINE GENERAL MEDICAL EXAMINATION AT A HEALTH CARE FACILITY: Primary | ICD-10-CM

## 2024-11-19 DIAGNOSIS — M25.561 CHRONIC PAIN OF RIGHT KNEE: ICD-10-CM

## 2024-11-19 DIAGNOSIS — F41.8 DEPRESSION WITH ANXIETY: ICD-10-CM

## 2024-11-19 DIAGNOSIS — E03.9 ACQUIRED HYPOTHYROIDISM: ICD-10-CM

## 2024-11-19 DIAGNOSIS — F33.0 MAJOR DEPRESSIVE DISORDER, RECURRENT EPISODE, MILD (HCC): ICD-10-CM

## 2024-11-19 DIAGNOSIS — Z23 NEED FOR DIPHTHERIA-TETANUS-PERTUSSIS (TDAP) VACCINE: ICD-10-CM

## 2024-11-19 DIAGNOSIS — Z23 NEEDS FLU SHOT: ICD-10-CM

## 2024-11-19 DIAGNOSIS — R73.9 HYPERGLYCEMIA: ICD-10-CM

## 2024-11-19 DIAGNOSIS — M79.18 MYOFASCIAL PAIN: ICD-10-CM

## 2024-11-19 LAB
ALBUMIN SERPL-MCNC: 4.5 G/DL (ref 3.4–5)
ALBUMIN/GLOB SERPL: 2 {RATIO} (ref 1.1–2.2)
ALP SERPL-CCNC: 78 U/L (ref 40–129)
ALT SERPL-CCNC: 20 U/L (ref 10–40)
ANION GAP SERPL CALCULATED.3IONS-SCNC: 12 MMOL/L (ref 3–16)
AST SERPL-CCNC: 20 U/L (ref 15–37)
BASOPHILS # BLD: 0 K/UL (ref 0–0.2)
BASOPHILS NFR BLD: 0.5 %
BILIRUB SERPL-MCNC: 0.3 MG/DL (ref 0–1)
BILIRUB UR QL STRIP.AUTO: NEGATIVE
BUN SERPL-MCNC: 19 MG/DL (ref 7–20)
CALCIUM SERPL-MCNC: 9.3 MG/DL (ref 8.3–10.6)
CHLORIDE SERPL-SCNC: 103 MMOL/L (ref 99–110)
CHOLEST SERPL-MCNC: 201 MG/DL (ref 0–199)
CLARITY UR: CLEAR
CO2 SERPL-SCNC: 24 MMOL/L (ref 21–32)
COLOR UR: YELLOW
CREAT SERPL-MCNC: 0.8 MG/DL (ref 0.6–1.1)
DEPRECATED RDW RBC AUTO: 14.1 % (ref 12.4–15.4)
EOSINOPHIL # BLD: 0.1 K/UL (ref 0–0.6)
EOSINOPHIL NFR BLD: 1.2 %
EST. AVERAGE GLUCOSE BLD GHB EST-MCNC: 122.6 MG/DL
GFR SERPLBLD CREATININE-BSD FMLA CKD-EPI: 84 ML/MIN/{1.73_M2}
GLUCOSE SERPL-MCNC: 131 MG/DL (ref 70–99)
GLUCOSE UR STRIP.AUTO-MCNC: NEGATIVE MG/DL
HBA1C MFR BLD: 5.9 %
HCT VFR BLD AUTO: 39.8 % (ref 36–48)
HDLC SERPL-MCNC: 58 MG/DL (ref 40–60)
HGB BLD-MCNC: 13.1 G/DL (ref 12–16)
HGB UR QL STRIP.AUTO: NEGATIVE
KETONES UR STRIP.AUTO-MCNC: NEGATIVE MG/DL
LDLC SERPL CALC-MCNC: 123 MG/DL
LEUKOCYTE ESTERASE UR QL STRIP.AUTO: NEGATIVE
LYMPHOCYTES # BLD: 2.3 K/UL (ref 1–5.1)
LYMPHOCYTES NFR BLD: 34.3 %
MCH RBC QN AUTO: 29.5 PG (ref 26–34)
MCHC RBC AUTO-ENTMCNC: 33 G/DL (ref 31–36)
MCV RBC AUTO: 89.4 FL (ref 80–100)
MONOCYTES # BLD: 0.5 K/UL (ref 0–1.3)
MONOCYTES NFR BLD: 7.4 %
NEUTROPHILS # BLD: 3.8 K/UL (ref 1.7–7.7)
NEUTROPHILS NFR BLD: 56.6 %
NITRITE UR QL STRIP.AUTO: NEGATIVE
PH UR STRIP.AUTO: 5.5 [PH] (ref 5–8)
PLATELET # BLD AUTO: 243 K/UL (ref 135–450)
PMV BLD AUTO: 9.8 FL (ref 5–10.5)
POTASSIUM SERPL-SCNC: 4.2 MMOL/L (ref 3.5–5.1)
PROT SERPL-MCNC: 6.8 G/DL (ref 6.4–8.2)
PROT UR STRIP.AUTO-MCNC: NEGATIVE MG/DL
RBC # BLD AUTO: 4.45 M/UL (ref 4–5.2)
SODIUM SERPL-SCNC: 139 MMOL/L (ref 136–145)
SP GR UR STRIP.AUTO: 1.01 (ref 1–1.03)
T4 FREE SERPL-MCNC: 1.1 NG/DL (ref 0.9–1.8)
TRIGL SERPL-MCNC: 99 MG/DL (ref 0–150)
TSH SERPL DL<=0.005 MIU/L-ACNC: 2.15 UIU/ML (ref 0.27–4.2)
UA DIPSTICK W REFLEX MICRO PNL UR: NORMAL
URN SPEC COLLECT METH UR: NORMAL
UROBILINOGEN UR STRIP-ACNC: 0.2 E.U./DL
VLDLC SERPL CALC-MCNC: 20 MG/DL
WBC # BLD AUTO: 6.7 K/UL (ref 4–11)

## 2024-11-19 RX ORDER — METFORMIN HYDROCHLORIDE 500 MG/1
500 TABLET, EXTENDED RELEASE ORAL
Qty: 30 TABLET | Refills: 5 | Status: SHIPPED | OUTPATIENT
Start: 2024-11-19

## 2024-11-19 NOTE — PROGRESS NOTES
above    5. Depression with anxiety  Stable with prozac    6. Hyperglycemia  A1c high at 5.9, stable  Add trial metformin ER 500mg qd  Discussed use, benefit, and side effects of prescribed medications.  Barriers to medication compliance addressed.  All patient questions answered.  Pt voiced understanding.   Recheck bloodwork 3 months    7. Myofascial pain  Stable w/ supportive therapy  Discuss consideration of change gabapentin to lyrica  Will consider    8. Class 2 obesity without serious comorbidity with body mass index (BMI) of 35.0 to 35.9 in adult, unspecified obesity type  Stable with supportive therapy, lifestyle mgt, diet/exercise  Add metformin ER  F/u bloodwork 3 months    9. Chronic pain of right knee  Cont range of motion exercises, NSAIDS  Consider ortho    10. Need for diphtheria-tetanus-pertussis (Tdap) vaccine  Given today    11. Needs flu shot  Given today           Follow-up appointment:   3 months  prn    Discussed use, benefit, and side effects of all prescribed medications.  Barriers to medication compliance addressed.  All patient questions answered.  Pt voiced understanding.  When applicable, patient's outside records were reviewed through Care Everywhere.  The patient has signed appropriate paperworks/consents.

## 2024-11-27 NOTE — TELEPHONE ENCOUNTER
Medication:   Requested Prescriptions     Pending Prescriptions Disp Refills    FLUoxetine (PROZAC) 20 MG capsule [Pharmacy Med Name: FLUOXETINE 20MG CAPSULES] 30 capsule 5     Sig: TAKE 1 CAPSULE BY MOUTH DAILY     Last Filled:      Last appt: 11/19/2024   Next appt: Visit date not found    Last OARRS:       7/22/2024     3:59 PM   RX Monitoring   Periodic Controlled Substance Monitoring Possible medication side effects, risk of tolerance/dependence & alternative treatments discussed.;No signs of potential drug abuse or diversion identified.;Assessed functional status (ability to engage in work or other purposeful activities, the pain intensity and its interference with activities of daily living, quality of family life and social activities, and the physical activity)

## 2024-12-10 ENCOUNTER — OFFICE VISIT (OUTPATIENT)
Dept: FAMILY MEDICINE CLINIC | Age: 59
End: 2024-12-10
Payer: COMMERCIAL

## 2024-12-10 VITALS
HEIGHT: 60 IN | TEMPERATURE: 96.9 F | WEIGHT: 179.2 LBS | OXYGEN SATURATION: 98 % | BODY MASS INDEX: 35.18 KG/M2 | HEART RATE: 68 BPM | DIASTOLIC BLOOD PRESSURE: 76 MMHG | SYSTOLIC BLOOD PRESSURE: 134 MMHG

## 2024-12-10 DIAGNOSIS — M25.531 ACUTE PAIN OF RIGHT WRIST: Primary | ICD-10-CM

## 2024-12-10 DIAGNOSIS — M25.531 PAIN AND SWELLING OF RIGHT WRIST: ICD-10-CM

## 2024-12-10 DIAGNOSIS — M25.431 PAIN AND SWELLING OF RIGHT WRIST: ICD-10-CM

## 2024-12-10 DIAGNOSIS — M67.40 GANGLION CYST: ICD-10-CM

## 2024-12-10 PROCEDURE — G8484 FLU IMMUNIZE NO ADMIN: HCPCS | Performed by: FAMILY MEDICINE

## 2024-12-10 PROCEDURE — G8427 DOCREV CUR MEDS BY ELIG CLIN: HCPCS | Performed by: FAMILY MEDICINE

## 2024-12-10 PROCEDURE — 3017F COLORECTAL CA SCREEN DOC REV: CPT | Performed by: FAMILY MEDICINE

## 2024-12-10 PROCEDURE — G8417 CALC BMI ABV UP PARAM F/U: HCPCS | Performed by: FAMILY MEDICINE

## 2024-12-10 PROCEDURE — 99214 OFFICE O/P EST MOD 30 MIN: CPT | Performed by: FAMILY MEDICINE

## 2024-12-10 PROCEDURE — 1036F TOBACCO NON-USER: CPT | Performed by: FAMILY MEDICINE

## 2024-12-10 RX ORDER — HYDROCODONE BITARTRATE AND ACETAMINOPHEN 5; 325 MG/1; MG/1
1 TABLET ORAL EVERY 8 HOURS PRN
Qty: 18 TABLET | Refills: 0 | Status: SHIPPED | OUTPATIENT
Start: 2024-12-10 | End: 2024-12-17

## 2024-12-10 RX ORDER — METHYLPREDNISOLONE 4 MG/1
TABLET ORAL
Qty: 21 TABLET | Refills: 0 | Status: SHIPPED | OUTPATIENT
Start: 2024-12-10

## 2024-12-10 NOTE — PROGRESS NOTES
Here for f/u and rehcheck of hand after recent surgery.  Pt had hand surgery with dr. Denise with excision of gangion cyst on 9/18/2024.  Surgery went well overall but about 1 week ago developed some swelling at site of surgery as well as some swelling wrist.  Pt did called the surgeon a few times and she left message but they never returned call.  No trauma or injury.  Pain is moderate to severe.  Pt has been using and never had any injury.  Pt did try some NSAIDS and ice but not helpful at all.      Except as noted above in the history of present illness, the review of systems is  negative for headache, vision changes, chest pain, shortness of breath, abdominal pain, urinary sx, bowel changes.    Past medical, surgical, and social history reviewed and updated  Medications and allergies reviewed and updated      O: /76   Pulse 68   Temp 96.9 °F (36.1 °C)   Ht 1.524 m (5')   Wt 81.3 kg (179 lb 3.2 oz)   SpO2 98%   BMI 35.00 kg/m²   GEN: No acute distress, cooperative, well nourished, alert.   CV: Regular rate and rhythm, no murmur, rubs, gallops. No edema.  Resp: Clear to auscultation bilaterally good air entry bilaterally  No crackles, wheeze. Breathing comfortably.   Psych: mood stable, No suicidal thoughts or ideation   Musc: well healing incision to dorsum of R wrist with no erythema, mild swelling, mildly tender.  No fluctuance discharge, warmcth        .ASSESSMENT / PLAN:    1. Acute pain of right wrist  S/p recent surgery, suspect inflammation  Exam nonfocal  No s/s of infection  Ice, rest, medrol pack x 1  Short course norco as below  If persistent sx, will get in touch with ortho for reeval/discussion  - HYDROcodone-acetaminophen (NORCO) 5-325 MG per tablet; Take 1 tablet by mouth every 8 hours as needed for Pain for up to 7 days. Intended supply: 3 days. Take lowest dose possible to manage pain Max Daily Amount: 3 tablets  Dispense: 18 tablet; Refill: 0    2. Pain and swelling of right wrist  As

## 2025-01-23 ENCOUNTER — OFFICE VISIT (OUTPATIENT)
Dept: FAMILY MEDICINE CLINIC | Age: 60
End: 2025-01-23

## 2025-01-23 VITALS
DIASTOLIC BLOOD PRESSURE: 86 MMHG | RESPIRATION RATE: 16 BRPM | TEMPERATURE: 96.9 F | WEIGHT: 188.8 LBS | BODY MASS INDEX: 37.07 KG/M2 | OXYGEN SATURATION: 97 % | SYSTOLIC BLOOD PRESSURE: 124 MMHG | HEIGHT: 60 IN | HEART RATE: 90 BPM

## 2025-01-23 DIAGNOSIS — M54.2 CHRONIC NECK PAIN: ICD-10-CM

## 2025-01-23 DIAGNOSIS — G89.29 CHRONIC RIGHT SHOULDER PAIN: ICD-10-CM

## 2025-01-23 DIAGNOSIS — F33.0 MAJOR DEPRESSIVE DISORDER, RECURRENT EPISODE, MILD (HCC): ICD-10-CM

## 2025-01-23 DIAGNOSIS — E03.9 ACQUIRED HYPOTHYROIDISM: ICD-10-CM

## 2025-01-23 DIAGNOSIS — G89.29 CHRONIC NECK PAIN: ICD-10-CM

## 2025-01-23 DIAGNOSIS — G43.109 MIGRAINE WITH AURA AND WITHOUT STATUS MIGRAINOSUS, NOT INTRACTABLE: Primary | ICD-10-CM

## 2025-01-23 DIAGNOSIS — M79.18 MYOFASCIAL PAIN: ICD-10-CM

## 2025-01-23 DIAGNOSIS — M25.511 CHRONIC RIGHT SHOULDER PAIN: ICD-10-CM

## 2025-01-23 DIAGNOSIS — R73.9 HYPERGLYCEMIA: ICD-10-CM

## 2025-01-23 RX ORDER — SUMATRIPTAN SUCCINATE 100 MG/1
100 TABLET ORAL DAILY PRN
Qty: 9 TABLET | Refills: 5 | Status: SHIPPED | OUTPATIENT
Start: 2025-01-23

## 2025-01-23 RX ORDER — MELOXICAM 15 MG/1
15 TABLET ORAL DAILY
Qty: 30 TABLET | Refills: 3 | Status: SHIPPED | OUTPATIENT
Start: 2025-01-23

## 2025-01-23 SDOH — ECONOMIC STABILITY: FOOD INSECURITY: WITHIN THE PAST 12 MONTHS, YOU WORRIED THAT YOUR FOOD WOULD RUN OUT BEFORE YOU GOT MONEY TO BUY MORE.: NEVER TRUE

## 2025-01-23 SDOH — ECONOMIC STABILITY: FOOD INSECURITY: WITHIN THE PAST 12 MONTHS, THE FOOD YOU BOUGHT JUST DIDN'T LAST AND YOU DIDN'T HAVE MONEY TO GET MORE.: NEVER TRUE

## 2025-01-23 ASSESSMENT — PATIENT HEALTH QUESTIONNAIRE - PHQ9
9. THOUGHTS THAT YOU WOULD BE BETTER OFF DEAD, OR OF HURTING YOURSELF: NOT AT ALL
10. IF YOU CHECKED OFF ANY PROBLEMS, HOW DIFFICULT HAVE THESE PROBLEMS MADE IT FOR YOU TO DO YOUR WORK, TAKE CARE OF THINGS AT HOME, OR GET ALONG WITH OTHER PEOPLE: NOT DIFFICULT AT ALL
SUM OF ALL RESPONSES TO PHQ QUESTIONS 1-9: 0
SUM OF ALL RESPONSES TO PHQ9 QUESTIONS 1 & 2: 0
5. POOR APPETITE OR OVEREATING: NOT AT ALL
SUM OF ALL RESPONSES TO PHQ QUESTIONS 1-9: 0
3. TROUBLE FALLING OR STAYING ASLEEP: NOT AT ALL
1. LITTLE INTEREST OR PLEASURE IN DOING THINGS: NOT AT ALL
6. FEELING BAD ABOUT YOURSELF - OR THAT YOU ARE A FAILURE OR HAVE LET YOURSELF OR YOUR FAMILY DOWN: NOT AT ALL
8. MOVING OR SPEAKING SO SLOWLY THAT OTHER PEOPLE COULD HAVE NOTICED. OR THE OPPOSITE, BEING SO FIGETY OR RESTLESS THAT YOU HAVE BEEN MOVING AROUND A LOT MORE THAN USUAL: NOT AT ALL
SUM OF ALL RESPONSES TO PHQ QUESTIONS 1-9: 0
4. FEELING TIRED OR HAVING LITTLE ENERGY: NOT AT ALL
7. TROUBLE CONCENTRATING ON THINGS, SUCH AS READING THE NEWSPAPER OR WATCHING TELEVISION: NOT AT ALL
2. FEELING DOWN, DEPRESSED OR HOPELESS: NOT AT ALL
SUM OF ALL RESPONSES TO PHQ QUESTIONS 1-9: 0

## 2025-01-23 NOTE — PROGRESS NOTES
Here for f/u, pt states that she has noted some issues of CRISOSTOMO.  Pt has had migraines in the past but these feel different and migraines have been gone for a long time.  Pt states that these HA have been present for about 6 weeks, occurring 2-3 x per week and sx are associated with neck pain and stiffness.  HA are usually on L side but can be bilateral, usually are up and down face and arm.  Sx are not all day, but now has continued.  Sx will last for a few hours, and does get nausea and weird smells, followed by HA.  When sx show up, has tried over the counter.  Pt has been on meds in the past but not recently.      Pt feels stregnth is ok, but doesn't feel 'great' on both sides.  Pt has had some pain in R shoulder.  Can have discomfort with range of motion.  Strength is normal      Except as noted above in the history of present illness, the review of systems is  negative for headache, vision changes, chest pain, shortness of breath, abdominal pain, urinary sx, bowel changes.    Past medical, surgical, and social history reviewed and updated  Medications and allergies reviewed and updated        O: /86   Pulse 90   Temp 96.9 °F (36.1 °C)   Resp 16   Ht 1.524 m (5')   Wt 85.6 kg (188 lb 12.8 oz)   SpO2 97%   BMI 36.87 kg/m²   GEN: No acute distress, cooperative, well nourished, alert.   HEENT: PEERLA, EOMI , normocephalic/atraumatic, nares and oropharynx clear.  Mucous membranes normal, Tympanic membranes clear bilaterally.  Neck: soft, supple, no thyromegaly, mass, no Lymphadenopathy  CV: Regular rate and rhythm, no murmur, rubs, gallops. No edema.  Resp: Clear to auscultation bilaterally good air entry bilaterally  No crackles, wheeze. Breathing comfortably.   Psych: mood stable, No suicidal thoughts or ideation   Neuro: cranial nerves II-XII intact.  Gait within normal limits.  Sensation intact, strength 5/5 bilateral upper extremities, bilateral lower extremities  Musc: decrease in range of motion

## 2025-01-28 ENCOUNTER — HOSPITAL ENCOUNTER (OUTPATIENT)
Dept: GENERAL RADIOLOGY | Age: 60
Discharge: HOME | End: 2025-01-28
Payer: COMMERCIAL

## 2025-01-28 DIAGNOSIS — M25.511 CHRONIC RIGHT SHOULDER PAIN: ICD-10-CM

## 2025-01-28 DIAGNOSIS — G89.29 CHRONIC RIGHT SHOULDER PAIN: ICD-10-CM

## 2025-01-28 DIAGNOSIS — G89.29 CHRONIC NECK PAIN: ICD-10-CM

## 2025-01-28 DIAGNOSIS — M54.2 CHRONIC NECK PAIN: ICD-10-CM

## 2025-01-28 PROCEDURE — 72040 X-RAY EXAM NECK SPINE 2-3 VW: CPT

## 2025-01-28 PROCEDURE — 73030 X-RAY EXAM OF SHOULDER: CPT

## 2025-02-06 ENCOUNTER — TELEMEDICINE (OUTPATIENT)
Dept: FAMILY MEDICINE CLINIC | Age: 60
End: 2025-02-06
Payer: COMMERCIAL

## 2025-02-06 DIAGNOSIS — M50.30 DDD (DEGENERATIVE DISC DISEASE), CERVICAL: ICD-10-CM

## 2025-02-06 DIAGNOSIS — L24.9 IRRITANT CONTACT DERMATITIS, UNSPECIFIED TRIGGER: Primary | ICD-10-CM

## 2025-02-06 PROCEDURE — G8427 DOCREV CUR MEDS BY ELIG CLIN: HCPCS | Performed by: FAMILY MEDICINE

## 2025-02-06 PROCEDURE — 3017F COLORECTAL CA SCREEN DOC REV: CPT | Performed by: FAMILY MEDICINE

## 2025-02-06 PROCEDURE — 1036F TOBACCO NON-USER: CPT | Performed by: FAMILY MEDICINE

## 2025-02-06 PROCEDURE — 99214 OFFICE O/P EST MOD 30 MIN: CPT | Performed by: FAMILY MEDICINE

## 2025-02-06 PROCEDURE — G2211 COMPLEX E/M VISIT ADD ON: HCPCS | Performed by: FAMILY MEDICINE

## 2025-02-06 PROCEDURE — G8417 CALC BMI ABV UP PARAM F/U: HCPCS | Performed by: FAMILY MEDICINE

## 2025-02-06 RX ORDER — CLOTRIMAZOLE AND BETAMETHASONE DIPROPIONATE 10; .64 MG/G; MG/G
CREAM TOPICAL
Qty: 15 G | Refills: 0 | Status: SHIPPED | OUTPATIENT
Start: 2025-02-06

## 2025-02-06 RX ORDER — CELECOXIB 200 MG/1
200 CAPSULE ORAL DAILY
Qty: 30 CAPSULE | Refills: 5 | Status: SHIPPED | OUTPATIENT
Start: 2025-02-06

## 2025-02-06 NOTE — PROGRESS NOTES
Exam  [INSTRUCTIONS:  \"[x]\" Indicates a positive item  \"[]\" Indicates a negative item  -- DELETE ALL ITEMS NOT EXAMINED]    Constitutional: [x] Appears well-developed and well-nourished [x] No apparent distress      [] Abnormal -     Mental status: [x] Alert and awake  [x] Oriented to person/place/time [x] Able to follow commands    [] Abnormal -     Eyes:   EOM    [x]  Normal    [] Abnormal -   Sclera  [x]  Normal    [] Abnormal -          Discharge [x]  None visible   [] Abnormal -     HENT: [x] Normocephalic, atraumatic  [] Abnormal -   [x] Mouth/Throat: Mucous membranes are moist    External Ears [x] Normal  [] Abnormal -    Neck: [x] No visualized mass [] Abnormal -     Pulmonary/Chest: [x] Respiratory effort normal   [x] No visualized signs of difficulty breathing or respiratory distress        [] Abnormal -      Musculoskeletal:   [x] Normal gait with no signs of ataxia         [x] Normal range of motion of neck        [] Abnormal -     Neurological:        [x] No Facial Asymmetry (Cranial nerve 7 motor function) (limited exam due to video visit)          [x] No gaze palsy        [] Abnormal -          Skin:        [x] No significant exanthematous lesions or discoloration noted on facial skin         [] Abnormal -            Psychiatric:       [x] Normal Affect [] Abnormal -        [x] No Hallucinations    Other pertinent observable physical exam findings:-       ASSESSMENT / PLAN:    1. Irritant contact dermatitis, unspecified trigger  The patient is reassured that these symptoms do not appear to represent a serious or threatening condition.  No s/s of butterfly rash, normal bloodwork done a few months ago r/o CTD/rheum dz  Lotrisone cream to affected area BID    2. DDD (degenerative disc disease), cervical  Reviewed xray  Refer for physical therapy and will monitor   Trial celebrex 200mg qd  [facility]           Follow-up appointment:   Call or return

## 2025-02-10 ENCOUNTER — OFFICE VISIT (OUTPATIENT)
Dept: FAMILY MEDICINE CLINIC | Age: 60
End: 2025-02-10

## 2025-02-10 VITALS
WEIGHT: 183.2 LBS | DIASTOLIC BLOOD PRESSURE: 78 MMHG | BODY MASS INDEX: 35.78 KG/M2 | HEART RATE: 88 BPM | SYSTOLIC BLOOD PRESSURE: 132 MMHG | TEMPERATURE: 97.1 F | OXYGEN SATURATION: 98 %

## 2025-02-10 DIAGNOSIS — H11.31 SUBCONJUNCTIVAL HEMORRHAGE OF RIGHT EYE: ICD-10-CM

## 2025-02-10 DIAGNOSIS — G43.109 MIGRAINE WITH AURA AND WITHOUT STATUS MIGRAINOSUS, NOT INTRACTABLE: Primary | ICD-10-CM

## 2025-02-10 DIAGNOSIS — R03.0 ELEVATED BLOOD PRESSURE READING: ICD-10-CM

## 2025-02-10 DIAGNOSIS — R07.89 CHEST PRESSURE: ICD-10-CM

## 2025-02-10 DIAGNOSIS — E03.9 ACQUIRED HYPOTHYROIDISM: ICD-10-CM

## 2025-02-10 RX ORDER — GUANFACINE 4 MG/1
4 TABLET, EXTENDED RELEASE ORAL DAILY
Qty: 30 TABLET | Refills: 5 | Status: SHIPPED | OUTPATIENT
Start: 2025-02-10

## 2025-02-10 RX ORDER — RIMEGEPANT SULFATE 75 MG/75MG
75 TABLET, ORALLY DISINTEGRATING ORAL DAILY PRN
Qty: 8 TABLET | Refills: 5 | Status: SHIPPED | OUTPATIENT
Start: 2025-02-10

## 2025-02-10 NOTE — PROGRESS NOTES
tablet 5    rimegepant sulfate (NURTEC) 75 MG TBDP Take 75 mg by mouth daily as needed (migraine headache) 8 tablet 5    clotrimazole-betamethasone (LOTRISONE) 1-0.05 % cream Apply topically 2 times daily. 15 g 0    celecoxib (CELEBREX) 200 MG capsule Take 1 capsule by mouth daily 30 capsule 5    FLUoxetine (PROZAC) 20 MG capsule TAKE 1 CAPSULE BY MOUTH DAILY 30 capsule 5    metFORMIN (GLUCOPHAGE-XR) 500 MG extended release tablet Take 1 tablet by mouth daily (with breakfast) 30 tablet 5    gabapentin (NEURONTIN) 300 MG capsule Take 1 capsule by mouth 3 times daily for 180 days. Intended supply: 30 days 90 capsule 5    tiZANidine (ZANAFLEX) 4 MG tablet Take 1 tablet by mouth every 8 hours as needed (muscle spasm) 30 tablet 0    pantoprazole (PROTONIX) 40 MG tablet Take 1 tablet by mouth every morning (before breakfast) 30 tablet 5    ciprofloxacin (CIPRO) 500 MG tablet TAKE 1 TABLET BY MOUTH DAILY AS NEEDED prn SEXUAL ACTIVITY 30 tablet 2    levothyroxine (SYNTHROID) 50 MCG tablet TAKE 1 TABLET BY MOUTH DAILY 90 tablet 1     No current facility-administered medications for this visit.         ASSESSMENT / PLAN:    1. Migraine with aura and without status migrainosus, not intractable  Intol imitex d/t chest pain and shortness of breath  Trial nurtec 75mg QD prn  Discussed use, benefit, and side effects of prescribed medications.  Barriers to medication compliance addressed.  All patient questions answered.  Pt voiced understanding.   - rimegepant sulfate (NURTEC) 75 MG TBDP; Take 75 mg by mouth daily as needed (migraine headache)  Dispense: 8 tablet; Refill: 5    2. Chest pressure  Muscular vs side effects from imitrex  EKG normal, unchanged  Check cT calcium score  - EKG 12 Lead  - CT CARDIAC CALCIUM SCORING; Future    3. Acquired hypothyroidism  Stable w/ supplementation.    4. Subconjunctival hemorrhage of right eye  The patient is reassured that these symptoms do not appear to represent a serious or threatening

## 2025-02-17 ENCOUNTER — HOSPITAL ENCOUNTER (OUTPATIENT)
Dept: PHYSICAL THERAPY | Age: 60
Setting detail: THERAPIES SERIES
End: 2025-02-17
Payer: COMMERCIAL

## 2025-02-18 ENCOUNTER — HOSPITAL ENCOUNTER (OUTPATIENT)
Dept: PHYSICAL THERAPY | Age: 60
Setting detail: THERAPIES SERIES
Discharge: HOME | End: 2025-02-18
Payer: COMMERCIAL

## 2025-02-18 PROCEDURE — 97140 MANUAL THERAPY 1/> REGIONS: CPT

## 2025-02-18 PROCEDURE — 97161 PT EVAL LOW COMPLEX 20 MIN: CPT

## 2025-02-18 PROCEDURE — 97110 THERAPEUTIC EXERCISES: CPT

## 2025-02-18 NOTE — PLAN OF CARE
[facility] - Outpatient Rehabilitation and Therapy: [address], [address], [place] [address] office: [phone] fax: [phone]     Physical Therapy Initial Evaluation Certification      Dear Andrew Kim MD ,    We had the pleasure of evaluating the following patient for physical therapy services at [facility].  A summary of our findings can be found in the initial assessment below.  This includes our plan of care.  If you have any questions or concerns regarding these findings, please do not hesitate to contact me at the office phone number listed above.  Thank you for the referral.     Physician Signature:_______________________________Date:__________________  By signing above (or electronic signature), therapist’s plan is approved by physician       Physical Therapy: TREATMENT/PROGRESS NOTE   Patient: Alea Smith (59 y.o. female)   Examination Date: 2025   :  1965 MRN: [id number]   Visit #: 1   Insurance Allowable Auth Needed   Yes []Yes    [x]No    Insurance: Payor: [employer] / Plan: [employer] - CHOICE PLUS / Product Type: *No Product type* /   Insurance ID: [id number] - (Commercial)  Secondary Insurance (if applicable):    Treatment Diagnosis: Shoulder pain, neck pain    Medical Diagnosis:  DDD (degenerative disc disease), cervical [M50.30]   Referring Physician: Andrew Kim MD  PCP: Andrew Kim MD     Plan of care signed (Y/N):     Date of Patient follow up with Physician:      Plan of Care Report: EVAL today  POC update due: (10 visits /OR AUTH LIMITS, whichever is less)  3/20/2025                                            Medical History:  Comorbidities:  Diabetes (Type I or II)  Osteoporosis/Osteopenia  Osteoarthritis  Anxiety  Depression  Relevant Medical History: R ganglion cyst removal on wrist                                          Precautions/ Contra-indications:           Latex allergy:

## 2025-02-28 ENCOUNTER — HOSPITAL ENCOUNTER (OUTPATIENT)
Dept: PHYSICAL THERAPY | Age: 60
Setting detail: THERAPIES SERIES
Discharge: HOME | End: 2025-02-28
Payer: COMMERCIAL

## 2025-02-28 PROCEDURE — 97140 MANUAL THERAPY 1/> REGIONS: CPT | Performed by: SPECIALIST/TECHNOLOGIST

## 2025-02-28 PROCEDURE — 97110 THERAPEUTIC EXERCISES: CPT | Performed by: SPECIALIST/TECHNOLOGIST

## 2025-02-28 NOTE — FLOWSHEET NOTE
[facility] - Outpatient Rehabilitation and Therapy: [address], [place] [address] office: [phone] fax: [phone]           Physical Therapy: TREATMENT/PROGRESS NOTE   Patient: Alea Smith (59 y.o. female)   Examination Date: 2025   :  1965 MRN: [id number]   Visit #: 2   Insurance Allowable Auth Needed   Yes, 20 visits  []Yes    [x]No    Insurance: Payor: [employer] / Plan: [employer] - CHOICE PLUS / Product Type: *No Product type* /   Insurance ID: [id number] - (Commercial)  Secondary Insurance (if applicable):    Treatment Diagnosis: Shoulder pain, neck pain    Medical Diagnosis:  DDD (degenerative disc disease), cervical [M50.30]   Referring Physician: Andrew Kim MD  PCP: Andrew Kim MD     Plan of care signed (Y/N):     Date of Patient follow up with Physician:      Plan of Care Report: NO  POC update due: (10 visits /OR AUTH LIMITS, whichever is less)  3/20/2025                                            Medical History:  Comorbidities:  Diabetes (Type I or II)  Osteoporosis/Osteopenia  Osteoarthritis  Anxiety  Depression  Relevant Medical History: R ganglion cyst removal on wrist                                          Precautions/ Contra-indications:           Latex allergy:  NO  Pacemaker:    NO  Contraindications for Manipulation: None  Date of Surgery: N/A  Other:    Red Flags:  Fatigue/Malaise  Nausea/Vomiting    Suicide Screening:   The patient did not verbalize a primary behavioral concern, suicidal ideation, suicidal intent, or demonstrate suicidal behaviors.    Preferred Language for Healthcare:   [x] English       [] other:    SUBJECTIVE EXAMINATION     Patient stated complaint: Pt. Reports the hands on work last visit made her radicular symptoms decrease.  Pt. Reports the HEP more her symptoms worst so she stop doing her HEP.        R shoulder and neck pain that has been getting worse for the past few weeks. Pt

## 2025-03-03 ENCOUNTER — HOSPITAL ENCOUNTER (OUTPATIENT)
Dept: PHYSICAL THERAPY | Age: 60
Setting detail: THERAPIES SERIES
Discharge: HOME | End: 2025-03-03
Payer: COMMERCIAL

## 2025-03-03 PROCEDURE — 97140 MANUAL THERAPY 1/> REGIONS: CPT | Performed by: SPECIALIST/TECHNOLOGIST

## 2025-03-03 PROCEDURE — 97110 THERAPEUTIC EXERCISES: CPT | Performed by: SPECIALIST/TECHNOLOGIST

## 2025-03-03 NOTE — FLOWSHEET NOTE
(36828) Sets/time                               Modalities:    No modalities applied this session  Hot Pack10'    Education/Home Exercise Program: Patient HEP program created electronically.  Refer to [employer] access code:    Access Code: [email or web address]  URL: [email or web address]  Date: 02/18/2025  Prepared by: Bran Maxwell    Exercises  - Seated Upper Trapezius Stretch  - 2-3 x daily - 7 x weekly - 3 reps - 30 hold  - Seated Levator Scapulae Stretch  - 2-3 x daily - 7 x weekly - 3 reps - 30 hold  - Seated Cervical Retraction  - 2-3 x daily - 7 x weekly - 2 sets - 10 reps    Access Code: [email or web address]  URL: [email or web address]  Date: 02/28/2025  Prepared by: Clayton Iraheta    Exercises  - Chin tuck  - 1 x daily - 7 x weekly - 1 sets - 10 reps - 3\" hold  - Doorway Pec Stretch at 60 Degrees Abduction with Arm Straight  - 1-2 x daily - 7 x weekly - 1 sets - 3 reps - 15\" hold  - Cervical Extension AROM with Strap  - 1-2 x daily - 7 x weekly - 1-2 sets - 10 reps - 5\" hold    ASSESSMENT       Today's Assessment: During therapy this date, patient required visual cueing, verbal cueing, tactile cueing, muscle facilitation, progression of exercises and program, and manual interventions for exercise progression, improving proper muscle recruitment and activation/motor control patterns, modulating pain, promoting relaxation, reduce/eliminate soft tissue swelling/inflammation/restriction, and allowing for proper ROM.Patient will continue to benefit from ongoing evaluation and advanced clinical decision from a Physical Therapist to address and improve pain control, ROM, muscle strength, functional mobility, and ADL status to safely return to PLOF without symptoms or restrictions. and Patient had good tolerance to today's session, reporting improved ROM, reduced pain, reduced soreness, and appropriate fatigue with program completed. Able to progress  exercise difficulty on PRE and modify HEP. Continues to display deficits

## 2025-03-14 ENCOUNTER — APPOINTMENT (OUTPATIENT)
Dept: PHYSICAL THERAPY | Age: 60
End: 2025-03-14
Payer: COMMERCIAL

## 2025-03-17 ENCOUNTER — HOSPITAL ENCOUNTER (OUTPATIENT)
Dept: PHYSICAL THERAPY | Age: 60
Setting detail: THERAPIES SERIES
Discharge: HOME | End: 2025-03-17
Payer: COMMERCIAL

## 2025-03-17 PROCEDURE — 97140 MANUAL THERAPY 1/> REGIONS: CPT

## 2025-03-17 PROCEDURE — 97110 THERAPEUTIC EXERCISES: CPT

## 2025-03-17 NOTE — FLOWSHEET NOTE
face-to-face)     Neuromusc. Re-ed (47064)    Re-Eval (34450)     Manual (87501) 15 1  Estim Unattended (86445)     Ther. Act (00176)    Mech. Traction (05348)     Gait (55586)    Dry Needle 1-2 muscle (98378)     Aquatic Therex (82825)    Dry Needle 3+ muscle (20561)     Iontophoresis (36174)    VASO (29049)     Ultrasound (50494)    Group Therapy (02947)     Estim Attended (77708)    Canalith Repositioning (79156)     Physical Performance Test (15005)    Custom orthotic ()     Other:    Other:    Total Timed Code Tx Minutes 25 2       Total Treatment Minutes 25        Charge Justification:  (65163) THERAPEUTIC EXERCISE - Provided verbal/tactile cueing for activities related to strengthening, flexibility, endurance, ROM performed to prevent loss of range of motion, maintain or improve muscular strength or increase flexibility, following either an injury or surgery.   (87457) HOME EXERCISE PROGRAM - Reviewed/Progressed HEP activities related to strengthening, flexibility, endurance, ROM performed to prevent loss of range of motion, maintain or improve muscular strength or increase flexibility, following either an injury or surgery.  (24188) MANUAL THERAPY -  Manual therapy techniques, 1 or more regions, each 15 minutes (Mobilization/manipulation, manual lymphatic drainage, manual traction) for the purpose of modulating pain, promoting relaxation,  increasing ROM, reducing/eliminating soft tissue swelling/inflammation/restriction, improving soft tissue extensibility and allowing for proper ROM for normal function with self care, mobility, lifting and ambulation    GOALS     Patient stated goal: To decreased neck and shoulder pain   [] Progressing: [x] Met: [] Not Met: [] Adjusted    Therapist goals for Patient:   Short Term Goals: To be achieved in: 2 weeks  1Independent in HEP and progression per patient tolerance, in order to prevent re-injury.   [] Progressing: [x] Met: [] Not Met: [] Adjusted  Patient will

## 2025-03-20 DIAGNOSIS — F33.0 MAJOR DEPRESSIVE DISORDER, RECURRENT EPISODE, MILD: ICD-10-CM

## 2025-03-20 NOTE — TELEPHONE ENCOUNTER
02/10/2025 LOV  No Scheduled FOV    Requested Prescriptions     Pending Prescriptions Disp Refills    ALPRAZolam (XANAX) 0.5 MG tablet [Pharmacy Med Name: ALPRAZOLAM 0.5MG TABLETS] 30 tablet      Sig: TAKE 1 TABLET BY MOUTH THREE TIMES DAILY AS NEEDED FOR SLEEP OR ANXIETY

## 2025-03-21 RX ORDER — ALPRAZOLAM 0.5 MG
TABLET ORAL
Qty: 30 TABLET | Refills: 2 | Status: SHIPPED | OUTPATIENT
Start: 2025-03-21 | End: 2025-06-19

## 2025-03-28 ENCOUNTER — TELEMEDICINE (OUTPATIENT)
Dept: FAMILY MEDICINE CLINIC | Age: 60
End: 2025-03-28
Payer: COMMERCIAL

## 2025-03-28 DIAGNOSIS — J01.90 ACUTE BACTERIAL SINUSITIS: Primary | ICD-10-CM

## 2025-03-28 DIAGNOSIS — B96.89 ACUTE BACTERIAL SINUSITIS: Primary | ICD-10-CM

## 2025-03-28 PROCEDURE — G8428 CUR MEDS NOT DOCUMENT: HCPCS | Performed by: NURSE PRACTITIONER

## 2025-03-28 PROCEDURE — 3017F COLORECTAL CA SCREEN DOC REV: CPT | Performed by: NURSE PRACTITIONER

## 2025-03-28 PROCEDURE — 99213 OFFICE O/P EST LOW 20 MIN: CPT | Performed by: NURSE PRACTITIONER

## 2025-03-28 RX ORDER — BROMPHENIRAMINE MALEATE, PSEUDOEPHEDRINE HYDROCHLORIDE, AND DEXTROMETHORPHAN HYDROBROMIDE 2; 10; 30 MG/5ML; MG/5ML; MG/5ML
5 SYRUP ORAL 3 TIMES DAILY PRN
Qty: 100 ML | Refills: 0 | Status: SHIPPED | OUTPATIENT
Start: 2025-03-28

## 2025-03-28 ASSESSMENT — ENCOUNTER SYMPTOMS
NAUSEA: 0
BACK PAIN: 0
CHEST TIGHTNESS: 0
CONSTIPATION: 0
SORE THROAT: 0
DIARRHEA: 0
ABDOMINAL PAIN: 0
BLOOD IN STOOL: 0
SINUS PAIN: 1
SHORTNESS OF BREATH: 0
RHINORRHEA: 0
EYE REDNESS: 0
EYE ITCHING: 0
COLOR CHANGE: 0
VOMITING: 0
SINUS PRESSURE: 1
COUGH: 1
WHEEZING: 0

## 2025-03-28 NOTE — ASSESSMENT & PLAN NOTE
- Reports a progressive decline in health status since 03/24/2025, with symptoms worsening over the past 24 hours.  - Physical exam findings include a raspy voice, yellow mucus expectoration, nasal discharge, facial sinus pain and pressure, and discomfort in jaws and ears.  - Despite negative COVID-19 and influenza tests, the condition is assessed as a respiratory infection, with symptoms reminiscent of previous bronchitis episodes.  - Bromfed cough syrup and an antibiotic regimen will be prescribed and sent to the pharmacy.

## 2025-03-28 NOTE — PROGRESS NOTES
Alea Smith, was evaluated through a synchronous (real-time) audio-video encounter. The patient (or guardian if applicable) is aware that this is a billable service, which includes applicable co-pays. This Virtual Visit was conducted with patient's (and/or legal guardian's) consent. Patient identification was verified, and a caregiver was present when appropriate.   The patient was located at Home: [address] [place] [address]  [address]  [facility] [address]   Provider was located at Facility (Appt Dept): [address] [place] [address]  [address]  [place] [address]  Confirm you are appropriately licensed, registered, or certified to deliver care in the state where the patient is located as indicated above. If you are not or unsure, please re-schedule the visit: Yes, I confirm.     Alea Smith (:  1965) is a 59 y.o. female, Established patient, here for evaluation of the following chief complaint(s):  No chief complaint on file.         Assessment & Plan  Acute bacterial sinusitis   - Reports a progressive decline in health status since 2025, with symptoms worsening over the past 24 hours.  - Physical exam findings include a raspy voice, yellow mucus expectoration, nasal discharge, facial sinus pain and pressure, and discomfort in jaws and ears.  - Despite negative COVID-19 and influenza tests, the condition is assessed as a respiratory infection, with symptoms reminiscent of previous bronchitis episodes.  - Bromfed cough syrup and an antibiotic regimen will be prescribed and sent to the pharmacy.              Assessment & Plan        No follow-ups on file.        Subjective   HPI  History of Present Illness  The patient presents via virtual visit for evaluation of a cough and cold.    A progressive decline in health status is reported since accompanying her boyfriend to the hospital on Monday for his shoulder replacement surgery. She describes an unusual raspy voice, which is not typical for her. Despite

## 2025-04-07 ENCOUNTER — OFFICE VISIT (OUTPATIENT)
Dept: FAMILY MEDICINE CLINIC | Age: 60
End: 2025-04-07
Payer: COMMERCIAL

## 2025-04-07 VITALS
TEMPERATURE: 97.7 F | HEIGHT: 60 IN | WEIGHT: 181 LBS | DIASTOLIC BLOOD PRESSURE: 74 MMHG | SYSTOLIC BLOOD PRESSURE: 132 MMHG | RESPIRATION RATE: 14 BRPM | HEART RATE: 76 BPM | BODY MASS INDEX: 35.53 KG/M2 | OXYGEN SATURATION: 97 %

## 2025-04-07 DIAGNOSIS — G43.109 MIGRAINE WITH AURA AND WITHOUT STATUS MIGRAINOSUS, NOT INTRACTABLE: ICD-10-CM

## 2025-04-07 DIAGNOSIS — M50.30 DDD (DEGENERATIVE DISC DISEASE), CERVICAL: ICD-10-CM

## 2025-04-07 DIAGNOSIS — M25.511 CHRONIC RIGHT SHOULDER PAIN: ICD-10-CM

## 2025-04-07 DIAGNOSIS — B96.89 ACUTE BACTERIAL SINUSITIS: Primary | ICD-10-CM

## 2025-04-07 DIAGNOSIS — G89.29 CHRONIC RIGHT SHOULDER PAIN: ICD-10-CM

## 2025-04-07 DIAGNOSIS — J01.90 ACUTE BACTERIAL SINUSITIS: Primary | ICD-10-CM

## 2025-04-07 DIAGNOSIS — R03.0 ELEVATED BLOOD PRESSURE READING: ICD-10-CM

## 2025-04-07 PROCEDURE — 3017F COLORECTAL CA SCREEN DOC REV: CPT | Performed by: FAMILY MEDICINE

## 2025-04-07 PROCEDURE — G2211 COMPLEX E/M VISIT ADD ON: HCPCS | Performed by: FAMILY MEDICINE

## 2025-04-07 PROCEDURE — G8427 DOCREV CUR MEDS BY ELIG CLIN: HCPCS | Performed by: FAMILY MEDICINE

## 2025-04-07 PROCEDURE — 1036F TOBACCO NON-USER: CPT | Performed by: FAMILY MEDICINE

## 2025-04-07 PROCEDURE — 99214 OFFICE O/P EST MOD 30 MIN: CPT | Performed by: FAMILY MEDICINE

## 2025-04-07 PROCEDURE — G8417 CALC BMI ABV UP PARAM F/U: HCPCS | Performed by: FAMILY MEDICINE

## 2025-04-07 RX ORDER — HYDROCODONE BITARTRATE AND ACETAMINOPHEN 5; 325 MG/1; MG/1
1 TABLET ORAL EVERY 8 HOURS PRN
Qty: 20 TABLET | Refills: 0 | Status: SHIPPED | OUTPATIENT
Start: 2025-04-07 | End: 2025-04-14

## 2025-04-07 RX ORDER — RIMEGEPANT SULFATE 75 MG/75MG
75 TABLET, ORALLY DISINTEGRATING ORAL DAILY PRN
Qty: 8 TABLET | Refills: 5 | Status: SHIPPED | OUTPATIENT
Start: 2025-04-07

## 2025-04-07 NOTE — PROGRESS NOTES
Here for f/u, pt was seen about 10d ago for acute bacterial sinusitis, and was tx with augmentin and cough meds.  Sinus sx are improved at this time    Pt here for eval of R shoulder pain.  Pt is concerned with some persistent pain issues.  Pt has tried NSAIDS and did find that physical therapy was helpful when she did it but feels that it is progressive and she is concerned that she is losing strength.  Pt did physical therapy for 4 weeks and felt it was temporary fix.      Pt here for follow up of blood pressure.  Pt states doing great with adherence to therapy and feels well.  No issues of chest pain, shortness of breath.  No vision changes, headache, swelling in legs.       Except as noted above in the history of present illness, the review of systems is  negative for headache, vision changes, chest pain, shortness of breath, abdominal pain, urinary sx, bowel changes.    Past medical, surgical, and social history reviewed and updated  Medications and allergies reviewed and updated      O: /74   Pulse 76   Temp 97.7 °F (36.5 °C) (Temporal)   Resp 14   Ht 1.524 m (5')   Wt 82.1 kg (181 lb)   SpO2 97%   BMI 35.35 kg/m²   GEN: No acute distress, cooperative, well nourished, alert.   HEENT: PEERLA, EOMI , normocephalic/atraumatic, nares and oropharynx clear.  Mucous membranes normal, Tympanic membranes clear bilaterally.  Neck: soft, supple, no thyromegaly, mass, no Lymphadenopathy  CV: Regular rate and rhythm, no murmur, rubs, gallops. No edema.  Resp: Clear to auscultation bilaterally good air entry bilaterally  No crackles, wheeze. Breathing comfortably.   Psych: mood stable, No suicidal thoughts or ideation   Musc:   moderate decrease in range of motion R shoulder with internal/external range of motion.  Strength 4/5 d/t pain.  Neurovascularly intact        ASSESSMENT / PLAN:    1. Migraine with aura and without status migrainosus, not intractable  Stable w/o flare  Cont prn nurtec  monitor  -

## 2025-04-08 ENCOUNTER — TELEPHONE (OUTPATIENT)
Dept: ADMINISTRATIVE | Age: 60
End: 2025-04-08

## 2025-04-08 NOTE — TELEPHONE ENCOUNTER
Submitted PA for Nurtec 75MG dispersible tablets  Via [facility] Key: [id number] STATUS: PENDING.    Follow up done daily; if no decision with in three days we will refax.  If another three days goes by with no decision will call the insurance for status.

## 2025-04-09 NOTE — TELEPHONE ENCOUNTER
APPROVAL for Nurtec 75MG dispersible tablets through 07/08/2025; letter attached.    If this requires a response please respond to the pool ( P MHCX PSC MEDICATION PRE-AUTH).      Thank you please advise patient.

## 2025-04-15 ENCOUNTER — HOSPITAL ENCOUNTER (OUTPATIENT)
Dept: MRI IMAGING | Age: 60
Discharge: HOME | End: 2025-04-15
Payer: COMMERCIAL

## 2025-04-15 DIAGNOSIS — M25.511 CHRONIC RIGHT SHOULDER PAIN: ICD-10-CM

## 2025-04-15 DIAGNOSIS — G89.29 CHRONIC RIGHT SHOULDER PAIN: ICD-10-CM

## 2025-04-15 PROCEDURE — 73221 MRI JOINT UPR EXTREM W/O DYE: CPT

## 2025-04-17 ENCOUNTER — RESULTS FOLLOW-UP (OUTPATIENT)
Dept: FAMILY MEDICINE CLINIC | Age: 60
End: 2025-04-17

## 2025-04-17 DIAGNOSIS — G89.29 CHRONIC RIGHT SHOULDER PAIN: Primary | ICD-10-CM

## 2025-04-17 DIAGNOSIS — M25.511 CHRONIC RIGHT SHOULDER PAIN: Primary | ICD-10-CM

## 2025-04-21 ENCOUNTER — TELEPHONE (OUTPATIENT)
Dept: FAMILY MEDICINE CLINIC | Age: 60
End: 2025-04-21

## 2025-04-21 NOTE — TELEPHONE ENCOUNTER
Patient want a letter to say she cannot lift  over 25 pounds,because she has a rotator cuff tear.Her job is moving to another office and she can't lift boxes. She doesn't see Dr George until 4/30/25. Her phone number is [phone]

## 2025-04-24 ENCOUNTER — TELEPHONE (OUTPATIENT)
Dept: FAMILY MEDICINE CLINIC | Age: 60
End: 2025-04-24

## 2025-04-30 ENCOUNTER — OFFICE VISIT (OUTPATIENT)
Dept: ORTHOPEDIC SURGERY | Age: 60
End: 2025-04-30

## 2025-04-30 VITALS — BODY MASS INDEX: 35.53 KG/M2 | WEIGHT: 181 LBS | HEIGHT: 60 IN

## 2025-04-30 DIAGNOSIS — M25.511 RIGHT SHOULDER PAIN, UNSPECIFIED CHRONICITY: ICD-10-CM

## 2025-04-30 DIAGNOSIS — M75.121 NONTRAUMATIC COMPLETE TEAR OF RIGHT ROTATOR CUFF: Primary | ICD-10-CM

## 2025-04-30 RX ORDER — METHYLPREDNISOLONE ACETATE 40 MG/ML
80 INJECTION, SUSPENSION INTRA-ARTICULAR; INTRALESIONAL; INTRAMUSCULAR; SOFT TISSUE ONCE
Status: COMPLETED | OUTPATIENT
Start: 2025-04-30 | End: 2025-04-30

## 2025-04-30 RX ORDER — LIDOCAINE HYDROCHLORIDE 10 MG/ML
8 INJECTION, SOLUTION INFILTRATION; PERINEURAL ONCE
Status: COMPLETED | OUTPATIENT
Start: 2025-04-30 | End: 2025-04-30

## 2025-04-30 RX ADMIN — METHYLPREDNISOLONE ACETATE 80 MG: 40 INJECTION, SUSPENSION INTRA-ARTICULAR; INTRALESIONAL; INTRAMUSCULAR; SOFT TISSUE at 17:31

## 2025-04-30 RX ADMIN — LIDOCAINE HYDROCHLORIDE 8 ML: 10 INJECTION, SOLUTION INFILTRATION; PERINEURAL at 17:31

## 2025-04-30 NOTE — PROGRESS NOTES
[facility]  History and Physical  Shoulder Pain    Date:  2025    Name:  Alea Smith  Address:  [address]  [address]  [place] [address]    :  1965      Age:   59 y.o.    SSN:  [id number]      Medical Record Number:  [id number]    Reason for Visit:    Shoulder Pain (NP RIGHT SHOULDER)      HPI:   Alea Smith is a 59 y.o. female who presents to our office today complaining of  right shoulder pain.  Patient states that her pain started insidiously in January without any specific injury.  She states in February she was making her grandchild's bunk bed and felt a pop in experienced pain.  Since then she has had significant amounts of pain especially at night and overhead activities.  She did see her primary care provider and was directed to do physical therapy.  She did a few sessions states that she did have some relief afterwards however she continues to have pain at this time.  She been taking some oral anti-inflammatories on and off.  She denies any numbness or tingling at this time.  She not had any injections of any kind.    Pain Assessment  Location of Pain: Shoulder  Location Modifiers: Right  Severity of Pain: 10  Quality of Pain: Aching  Frequency of Pain: Constant  Aggravating Factors: Other (Comment), Straightening, Stretching  Limiting Behavior: Yes  Relieving Factors: Rest, Heat, Other (Comment)  Work-Related Injury: No  Are there other pain locations you wish to document?: No    Review of Systems:  A 14 point review of systems available in the scanned medical record as documented by the patient.  The review is negative with the exception of those things mentioned in the History of Present Illness and Past Medical History.      Past History:  Past Medical History:   Diagnosis Date    Acid reflux     Acquired cyst of kidney     Acquired hypothyroidism 2024    ADHD (attention deficit hyperactivity disorder) 10/01/2013    DDD

## 2025-05-09 ENCOUNTER — HOSPITAL ENCOUNTER (OUTPATIENT)
Dept: PHYSICAL THERAPY | Age: 60
Setting detail: THERAPIES SERIES
End: 2025-05-09
Payer: COMMERCIAL

## 2025-05-13 ENCOUNTER — HOSPITAL ENCOUNTER (OUTPATIENT)
Dept: PHYSICAL THERAPY | Age: 60
Setting detail: THERAPIES SERIES
Discharge: HOME | End: 2025-05-13
Payer: COMMERCIAL

## 2025-05-13 DIAGNOSIS — M25.511 ACUTE PAIN OF RIGHT SHOULDER: Primary | ICD-10-CM

## 2025-05-13 DIAGNOSIS — R53.1 WEAKNESS: ICD-10-CM

## 2025-05-13 PROCEDURE — 97161 PT EVAL LOW COMPLEX 20 MIN: CPT | Performed by: PHYSICAL THERAPIST

## 2025-05-13 PROCEDURE — 97110 THERAPEUTIC EXERCISES: CPT | Performed by: PHYSICAL THERAPIST

## 2025-05-13 NOTE — PLAN OF CARE
[facility] - Outpatient Rehabilitation and Therapy: [address], [place] [address] office: [phone] fax: [phone]     Physical Therapy Initial Evaluation Certification      Dear Mare George MD    We had the pleasure of evaluating the following patient for physical therapy services at [facility].  A summary of our findings can be found in the initial assessment below.  This includes our plan of care.  If you have any questions or concerns regarding these findings, please do not hesitate to contact me at the office phone number listed above.  Thank you for the referral.     Physician Signature:_______________________________Date:__________________  By signing above (or electronic signature), therapist’s plan is approved by physician       Physical Therapy: TREATMENT/PROGRESS NOTE   Patient: Alea Smith (59 y.o. female)   Examination Date: 2025   :  1965 MRN: [id number]   Visit #: 1   Insurance Allowable Auth Needed   20 VPCY; used 4 visits []Yes    [x]No    Insurance: Payor: UNITED [employer] / Plan: UNITED [employer] - CHOICE PLUS / Product Type: *No Product type* /   Insurance ID: [id number] - (Commercial)  Secondary Insurance (if applicable):    Treatment Diagnosis:     ICD-10-CM    1. Acute pain of right shoulder  M25.511       2. Weakness  R53.1          Medical Diagnosis:  Right shoulder pain, unspecified chronicity [M25.511]   Referring Physician: Mare George MD PCP: Andrew Kim MD     Plan of care signed (Y/N): pending    Date of Patient follow up with Physician: 25     Plan of Care Report: EVAL today  POC update due: (10 visits /OR AUTH LIMITS, whichever is less)  2025                                             Medical History:  Comorbidities:  Osteoarthritis  Anxiety  Depression  Relevant Medical History: LBP                                         Precautions/ Contra-indications:           Latex

## 2025-05-22 ENCOUNTER — HOSPITAL ENCOUNTER (OUTPATIENT)
Dept: PHYSICAL THERAPY | Age: 60
Setting detail: THERAPIES SERIES
Discharge: HOME | End: 2025-05-22
Payer: COMMERCIAL

## 2025-05-22 PROCEDURE — 97110 THERAPEUTIC EXERCISES: CPT | Performed by: PHYSICAL THERAPIST

## 2025-05-22 PROCEDURE — 97140 MANUAL THERAPY 1/> REGIONS: CPT | Performed by: PHYSICAL THERAPIST

## 2025-05-22 RX ORDER — METFORMIN HYDROCHLORIDE 500 MG/1
500 TABLET, EXTENDED RELEASE ORAL DAILY
Qty: 30 TABLET | Refills: 5 | Status: SHIPPED | OUTPATIENT
Start: 2025-05-22

## 2025-05-22 NOTE — FLOWSHEET NOTE
[facility] - Outpatient Rehabilitation and Therapy: [address], [place] [address] office: [phone] fax: [phone]     Physical Therapy: TREATMENT/PROGRESS NOTE   Patient: Alea Smith (59 y.o. female)   Examination Date: 2025   :  1965 MRN: [id number]   Visit #: 2   Insurance Allowable Auth Needed   20 VPCY; used 4 visits []Yes    [x]No    Insurance: Payor: UNITED HE[employer]CARE / Plan: UNITED HEALTHCARE - CHOICE PLUS / Product Type: *No Product type* /   Insurance ID: [id number] - (Commercial)  Secondary Insurance (if applicable):    Treatment Diagnosis:     ICD-10-CM    1. Acute pain of right shoulder  M25.511       2. Weakness  R53.1          Medical Diagnosis:  Right shoulder pain, unspecified chronicity [M25.511]   Referring Physician: Mare George MD PCP: Andrew Kim MD     Plan of care signed (Y/N): yes    Date of Patient follow up with Physician: 25     Plan of Care Report: NO  POC update due: (10 visits /OR AUTH LIMITS, whichever is less)  2025                                             Medical History:  Comorbidities:  Osteoarthritis  Anxiety  Depression  Relevant Medical History: LBP                                         Precautions/ Contra-indications:           Latex allergy:  NO  Pacemaker:    NO  Contraindications for Manipulation: None  Date of Surgery: N/A  Other:    Red Flags:  None    Suicide Screening:   The patient did not verbalize a primary behavioral concern, suicidal ideation, suicidal intent, or demonstrate suicidal behaviors.    Preferred Language for Healthcare:   [x] English       [] other:    SUBJECTIVE EXAMINATION     Patient stated complaint: Pt had loop recorder removed on Monday so she has been a bit sore and just recovering from that. Has not resumed HEP yet.        Test used Initial score  2025   Pain Summary VAS 1-2/10 post-inj  8-10/10 pre-inj    Functional questionnaire Upper Extremity

## 2025-06-06 ENCOUNTER — HOSPITAL ENCOUNTER (OUTPATIENT)
Dept: PHYSICAL THERAPY | Age: 60
Setting detail: THERAPIES SERIES
Discharge: HOME | End: 2025-06-06
Payer: COMMERCIAL

## 2025-06-06 PROCEDURE — 97110 THERAPEUTIC EXERCISES: CPT | Performed by: SPECIALIST/TECHNOLOGIST

## 2025-06-06 PROCEDURE — 97140 MANUAL THERAPY 1/> REGIONS: CPT | Performed by: SPECIALIST/TECHNOLOGIST

## 2025-06-06 NOTE — FLOWSHEET NOTE
Dry Needle 1-2 muscle (31951)     Aquatic Therex (80070)    Dry Needle 3+ muscle (29509)     Iontophoresis (02572)    VASO (98821)     Ultrasound (59806)    Group Therapy (24526)     Estim Attended (81133)    Canalith Repositioning (32693)     Physical Performance Test (35995)    Custom orthotic ()     Other:    Other:    Total Timed Code Tx Minutes 30 2       Total Treatment Minutes 30    3:45 - 4:25    Charge Justification:  (10910) THERAPEUTIC EXERCISE - Provided verbal/tactile cueing for HEP and/or activities related to strengthening, flexibility, endurance, ROM performed to prevent loss of range of motion, maintain or improve muscular strength or increase flexibility, following either an injury or surgery.   (29166) MANUAL THERAPY -  Manual therapy techniques, 1 or more regions, each 15 minutes (Mobilization/manipulation, manual lymphatic drainage, manual traction) for the purpose of modulating pain, promoting relaxation,  increasing ROM, reducing/eliminating soft tissue swelling/inflammation/restriction, improving soft tissue extensibility and allowing for proper ROM for normal function with self care, mobility, lifting and ambulation    GOALS     Patient stated goal: Eliminate shoulder pain  [] Progressing: [] Met: [] Not Met: [] Adjusted    Therapist goals for Patient:   Short Term Goals: To be achieved in: 2 weeks  1Independent in HEP and progression per patient tolerance, in order to prevent re-injury.   [] Progressing: [] Met: [] Not Met: [] Adjusted  Patient will have a decrease in pain to <1/10 to facilitate improvement in movement, function, and ADLs as indicated by Functional Deficits.  [] Progressing: [] Met: [] Not Met: [] Adjusted    IF APPLICABLE:  [] Patient to demonstrate independence in wear and care for custom orthotic device. (Only if applicable for orthotic eval)     Long Term Goals: To be achieved in: 8 weeks  Disability index score of 10% or less for the Upper Extremity functional

## 2025-06-11 ENCOUNTER — OFFICE VISIT (OUTPATIENT)
Dept: ORTHOPEDIC SURGERY | Age: 60
End: 2025-06-11
Payer: COMMERCIAL

## 2025-06-11 VITALS — WEIGHT: 181 LBS | BODY MASS INDEX: 34.17 KG/M2 | HEIGHT: 61 IN

## 2025-06-11 DIAGNOSIS — M25.511 RIGHT SHOULDER PAIN, UNSPECIFIED CHRONICITY: ICD-10-CM

## 2025-06-11 DIAGNOSIS — M75.121 NONTRAUMATIC COMPLETE TEAR OF RIGHT ROTATOR CUFF: Primary | ICD-10-CM

## 2025-06-11 PROCEDURE — 1036F TOBACCO NON-USER: CPT | Performed by: ORTHOPAEDIC SURGERY

## 2025-06-11 PROCEDURE — 3017F COLORECTAL CA SCREEN DOC REV: CPT | Performed by: ORTHOPAEDIC SURGERY

## 2025-06-11 PROCEDURE — G8417 CALC BMI ABV UP PARAM F/U: HCPCS | Performed by: ORTHOPAEDIC SURGERY

## 2025-06-11 PROCEDURE — G8427 DOCREV CUR MEDS BY ELIG CLIN: HCPCS | Performed by: ORTHOPAEDIC SURGERY

## 2025-06-11 PROCEDURE — 99213 OFFICE O/P EST LOW 20 MIN: CPT | Performed by: ORTHOPAEDIC SURGERY

## 2025-06-11 NOTE — PROGRESS NOTES
that the best course of action is to proceed with an MRI of her right shoulder.  This will allow us to assess her rotator cuff for any expansion of her pre-existing tear.  Treatment plan will depend on the finding of the MRI.  We discussed that in case of a larger tear that surgical fixation might be the best option for her at this point.  We plan to see her back after the MRI to discuss the next steps in treatment.    Alea Smith will follow up for MRI. She was in agreement with this plan and all questions were answered to her satisfaction. She was encouraged to call with any questions.     Enedelia Persaud MD  Sports Medicine Fellow  [facility]  6/11/2025    Dr. Mare George also reviewed this patient's history, assisted in obtaining history from the patient, conducting a physical exam, reviewed imaging, provided patient education and further coordinating care.     This dictation was performed with a verbal recognition program (DRAGON) and it was checked for errors.  It is possible that there are still dictated errors within this office note.  If so, please bring any areas to my attention for an addendum.  All efforts were made to ensure that this office note is accurate.  ___________  I was physically present and personally supervised the Orthopaedic Sports Medicine Fellow in the evaluation and development of a treatment plan for this patient. I personally interviewed the patient and performed a physical examination. In addition, I discussed the patient's condition and treatment options with them. I have also reviewed and agree with the past medical, family and social history unless otherwise noted. All of the patient's questions were answered.         Mare George MD, PhD  6/11/2025

## 2025-06-19 ENCOUNTER — HOSPITAL ENCOUNTER (OUTPATIENT)
Dept: MRI IMAGING | Age: 60
Discharge: HOME | End: 2025-06-19
Attending: ORTHOPAEDIC SURGERY
Payer: COMMERCIAL

## 2025-06-19 DIAGNOSIS — M25.511 RIGHT SHOULDER PAIN, UNSPECIFIED CHRONICITY: ICD-10-CM

## 2025-06-19 DIAGNOSIS — M75.121 NONTRAUMATIC COMPLETE TEAR OF RIGHT ROTATOR CUFF: ICD-10-CM

## 2025-06-19 PROCEDURE — 73221 MRI JOINT UPR EXTREM W/O DYE: CPT

## 2025-06-23 RX ORDER — LEVOTHYROXINE SODIUM 50 UG/1
50 TABLET ORAL DAILY
Qty: 90 TABLET | Refills: 1 | Status: SHIPPED | OUTPATIENT
Start: 2025-06-23

## 2025-06-25 ENCOUNTER — OFFICE VISIT (OUTPATIENT)
Dept: ORTHOPEDIC SURGERY | Age: 60
End: 2025-06-25

## 2025-06-25 VITALS — WEIGHT: 181 LBS | HEIGHT: 61 IN | BODY MASS INDEX: 34.17 KG/M2

## 2025-06-25 DIAGNOSIS — M75.111 NONTRAUMATIC INCOMPLETE TEAR OF RIGHT ROTATOR CUFF: Primary | ICD-10-CM

## 2025-06-25 RX ORDER — TRAMADOL HYDROCHLORIDE 50 MG/1
50 TABLET ORAL EVERY 4 HOURS PRN
Qty: 20 TABLET | Refills: 0 | Status: SHIPPED | OUTPATIENT
Start: 2025-06-25 | End: 2025-06-30

## 2025-06-26 ENCOUNTER — TELEPHONE (OUTPATIENT)
Dept: ORTHOPEDIC SURGERY | Age: 60
End: 2025-06-26

## 2025-06-27 NOTE — PROGRESS NOTES
Neurovascular: Sensation to light touch is intact, no motor deficits, palpable radial pulses 2+      Radiographic:  MRI R Shoulder 6/11/25 [facility]:  IMPRESSION:     1.  Focal partial-thickness bursal surface tear of the posterior insertional fibers of the supraspinatus tendon.  2.  Intrasubstance tearing of the anterior & stent insertion with small sentinel cyst at the anterior myotendinous junction.  3.  Intra-articular long head biceps tendinosis.  4.  Degenerative tear of the posterior superior acetabular labrum.  5.  Mild glenohumeral and moderate acromioclavicular joint arthrosis.  6.  Subacromial/subdeltoid bursitis.    Assessment:  Alea Smith is a 59 y.o. female with right partial thickness rotator cuff tear refractory to nonoperative treatment methods.    Impression:  Encounter Diagnosis   Name Primary?    Nontraumatic incomplete tear of right rotator cuff Yes       Office Procedures:  Orders Placed This Encounter   Procedures    DJO ultrasling Shoulder Sling     Patient was prescribed a DJO Ultrasling IV Shoulder Brace.   The right shoulder will require stabilization / immobilization from this orthosis.  The orthosis will assist in protecting the affected area, provide functional support and facilitate healing.  The device was ordered and fit on 06/25/25.    The patient was educated and fit by a healthcare professional with expert knowledge and specialization in brace application while under the direct supervision of the treating physician.  Verbal and written instructions for the use of and application of this item were provided.   They were instructed to contact the office immediately should the brace result in increased pain, decreased sensation, increased swelling or worsening of the condition.       Plan:   We had a thorough discussion with Alea Smith regarding her right shoulder symptoms and updated MRI findings.  There are no major changes compared with the prior MRI, but she does have symptoms

## 2025-06-30 ENCOUNTER — PREP FOR PROCEDURE (OUTPATIENT)
Dept: ORTHOPEDIC SURGERY | Age: 60
End: 2025-06-30

## 2025-06-30 DIAGNOSIS — M75.121 NONTRAUMATIC COMPLETE TEAR OF RIGHT ROTATOR CUFF: ICD-10-CM

## 2025-06-30 DIAGNOSIS — M75.111 NONTRAUMATIC INCOMPLETE TEAR OF RIGHT ROTATOR CUFF: Primary | ICD-10-CM

## 2025-06-30 DIAGNOSIS — M75.111 INCOMPLETE ROTATOR CUFF TEAR OR RUPTURE OF RIGHT SHOULDER, NOT SPECIFIED AS TRAUMATIC: ICD-10-CM

## 2025-06-30 RX ORDER — GABAPENTIN 300 MG/1
300 CAPSULE ORAL 3 TIMES DAILY
Qty: 90 CAPSULE | Refills: 5 | Status: SHIPPED | OUTPATIENT
Start: 2025-06-30 | End: 2025-12-27

## 2025-07-09 ENCOUNTER — TELEMEDICINE (OUTPATIENT)
Dept: FAMILY MEDICINE CLINIC | Age: 60
End: 2025-07-09
Payer: COMMERCIAL

## 2025-07-09 DIAGNOSIS — J06.9 VIRAL UPPER RESPIRATORY TRACT INFECTION: ICD-10-CM

## 2025-07-09 DIAGNOSIS — J01.90 ACUTE NON-RECURRENT SINUSITIS, UNSPECIFIED LOCATION: Primary | ICD-10-CM

## 2025-07-09 PROCEDURE — 99213 OFFICE O/P EST LOW 20 MIN: CPT | Performed by: NURSE PRACTITIONER

## 2025-07-09 RX ORDER — DEXTROMETHORPHAN HYDROBROMIDE AND PROMETHAZINE HYDROCHLORIDE 15; 6.25 MG/5ML; MG/5ML
5 SYRUP ORAL 4 TIMES DAILY PRN
Qty: 240 ML | Refills: 0 | Status: SHIPPED | OUTPATIENT
Start: 2025-07-09

## 2025-07-09 NOTE — PROGRESS NOTES
Positive for congestion. Negative for sinus pressure and sinus pain.    Respiratory:  Positive for cough. Negative for shortness of breath and wheezing.    Cardiovascular:  Negative for chest pain and palpitations.   Gastrointestinal:  Negative for abdominal pain, constipation and diarrhea.   Musculoskeletal:  Negative for arthralgias, back pain and myalgias.   Skin:  Negative for color change, pallor and rash.   Neurological:  Positive for headaches. Negative for dizziness, syncope, weakness and light-headedness.   Psychiatric/Behavioral:  Negative for behavioral problems, confusion and sleep disturbance. The patient is not nervous/anxious.           Objective   There were no vitals filed for this visit.    Physical Exam  Constitutional:       Appearance: Normal appearance.   HENT:      Head: Normocephalic and atraumatic.   Eyes:      Extraocular Movements: Extraocular movements intact.   Pulmonary:      Effort: Pulmonary effort is normal.   Musculoskeletal:      Cervical back: Normal range of motion.   Skin:     Coloration: Skin is not jaundiced or pale.   Neurological:      General: No focal deficit present.      Mental Status: She is alert and oriented to person, place, and time.   Psychiatric:         Mood and Affect: Mood normal.         Behavior: Behavior normal.         Thought Content: Thought content normal.          Physical Exam      The patient (or guardian, if applicable) and other individuals in attendance with the patient were advised that Artificial Intelligence will be utilized during this visit to record, process the conversation to generate a clinical note, and support improvement of the AI technology. The patient (or guardian, if applicable) and other individuals in attendance at the appointment consented to the use of AI, including the recording.            An electronic signature was used to authenticate this note.    --Verito Rg, MURRAY - CNP

## 2025-07-11 ASSESSMENT — ENCOUNTER SYMPTOMS
SHORTNESS OF BREATH: 0
DIARRHEA: 0
BACK PAIN: 0
SINUS PRESSURE: 0
ABDOMINAL PAIN: 0
COUGH: 1
WHEEZING: 0
CONSTIPATION: 0
COLOR CHANGE: 0
SINUS PAIN: 0

## 2025-08-12 DIAGNOSIS — G43.109 MIGRAINE WITH AURA AND WITHOUT STATUS MIGRAINOSUS, NOT INTRACTABLE: ICD-10-CM

## 2025-08-12 RX ORDER — RIMEGEPANT SULFATE 75 MG/75MG
75 TABLET, ORALLY DISINTEGRATING ORAL DAILY PRN
Qty: 8 TABLET | Refills: 5 | Status: SHIPPED | OUTPATIENT
Start: 2025-08-12

## 2025-08-14 ENCOUNTER — TELEPHONE (OUTPATIENT)
Dept: ADMINISTRATIVE | Age: 60
End: 2025-08-14

## 2025-08-26 ENCOUNTER — OFFICE VISIT (OUTPATIENT)
Dept: FAMILY MEDICINE CLINIC | Age: 60
End: 2025-08-26
Payer: COMMERCIAL

## 2025-08-26 VITALS
SYSTOLIC BLOOD PRESSURE: 106 MMHG | BODY MASS INDEX: 36.01 KG/M2 | DIASTOLIC BLOOD PRESSURE: 64 MMHG | WEIGHT: 183.4 LBS | HEIGHT: 60 IN | OXYGEN SATURATION: 94 % | HEART RATE: 77 BPM

## 2025-08-26 DIAGNOSIS — M25.511 CHRONIC RIGHT SHOULDER PAIN: ICD-10-CM

## 2025-08-26 DIAGNOSIS — L82.1 SK (SEBORRHEIC KERATOSIS): ICD-10-CM

## 2025-08-26 DIAGNOSIS — G89.29 CHRONIC RIGHT SHOULDER PAIN: ICD-10-CM

## 2025-08-26 DIAGNOSIS — L98.9 SKIN LESION: Primary | ICD-10-CM

## 2025-08-26 DIAGNOSIS — F33.0 MAJOR DEPRESSIVE DISORDER, RECURRENT EPISODE, MILD: ICD-10-CM

## 2025-08-26 PROCEDURE — G8427 DOCREV CUR MEDS BY ELIG CLIN: HCPCS | Performed by: FAMILY MEDICINE

## 2025-08-26 PROCEDURE — 1036F TOBACCO NON-USER: CPT | Performed by: FAMILY MEDICINE

## 2025-08-26 PROCEDURE — 3017F COLORECTAL CA SCREEN DOC REV: CPT | Performed by: FAMILY MEDICINE

## 2025-08-26 PROCEDURE — 99214 OFFICE O/P EST MOD 30 MIN: CPT | Performed by: FAMILY MEDICINE

## 2025-08-26 PROCEDURE — G8417 CALC BMI ABV UP PARAM F/U: HCPCS | Performed by: FAMILY MEDICINE

## 2025-08-26 RX ORDER — ALPRAZOLAM 0.5 MG
TABLET ORAL
Qty: 30 TABLET | Refills: 2 | Status: SHIPPED | OUTPATIENT
Start: 2025-08-26 | End: 2025-11-24

## (undated) DEVICE — PLATE ES AD W 9FT CRD 2

## (undated) DEVICE — 6619 2 PTNT ISO SYS INCISE AREA&LT;(&GT;&&LT;)&GT;P: Brand: STERI-DRAPE™ IOBAN™ 2

## (undated) DEVICE — APPLICATOR MEDICATED 26 CC SOLUTION HI LT ORNG CHLORAPREP

## (undated) DEVICE — SURGICAL SET UP - SURE SET: Brand: MEDLINE INDUSTRIES, INC.

## (undated) DEVICE — Z DISCONTINUED USE 2272114 DRAPE SURG UTIL 26X15 IN W/ TAPE N INVASIVE MULTLYR DISP

## (undated) DEVICE — 50-S XL SUCTION PROBE, NON-BENDABLE, MAX CUT LEVEL 11: Brand: SERFAS ENERGY

## (undated) DEVICE — MICRORAPTOR DRILL: Brand: MICRORAPTOR

## (undated) DEVICE — 1.8MM Q-FIX DISPOSABLE FLEXIBLE DRILL: Brand: Q-FIX

## (undated) DEVICE — TUBING PMP L16FT MAIN DISP FOR AR-6400 AR-6475

## (undated) DEVICE — XL, ARTHROSCOPIC SHAVER BLADES, DIAMOND ROUND BUR.  DO NOT RESTERILIZE, DO NOT USE IF PACKAGE IS DAMAGED, KEEP DRY, KEEP AWAY FROM SUNLIGHT: Brand: CROSSBLADE

## (undated) DEVICE — NANOTACK FLEX DRILL BIT: Brand: NANOTACK FLEX

## (undated) DEVICE — JEWISH HOSPITAL TURNOVER KIT: Brand: MEDLINE INDUSTRIES, INC.

## (undated) DEVICE — GLOVE ORTHO 7   MSG9470

## (undated) DEVICE — STERILE POLYISOPRENE POWDER-FREE SURGICAL GLOVES WITH EMOLLIENT COATING: Brand: PROTEXIS

## (undated) DEVICE — SURE SET-DOUBLE BASIN-LF: Brand: MEDLINE INDUSTRIES, INC.

## (undated) DEVICE — PAD,ABDOMINAL,5"X9",ST,LF,25/BX: Brand: MEDLINE INDUSTRIES, INC.

## (undated) DEVICE — XL TOMCAT, ANGLED HIP CUTTER. ARTHROSCOPIC SHAVER BLADE. FOR USE WITH: REF 0375-701-500, 0375-704-500, 0375-708-500. DO NOT USE IF PACKAGE IS DAMAGED, KEEP DRY, KEEP AWAY FROM SUNLIGHT: Brand: FORMULA

## (undated) DEVICE — PORTAL ENTRY KIT

## (undated) DEVICE — NANOPASS REACH CRESCENT: Brand: NANOPASS

## (undated) DEVICE — PAD DRY FLOOR ABS 32X58IN GRN

## (undated) DEVICE — C-ARM: Brand: UNBRANDED

## (undated) DEVICE — SOLUTION IRRIG 3000ML LAC R FLX CONT

## (undated) DEVICE — SUTURE ETHLN SZ 3-0 L18IN NONABSORBABLE BLK FS-1 L24MM 3/8 663H

## (undated) DEVICE — Device

## (undated) DEVICE — COVER LT HNDL BLU PLAS

## (undated) DEVICE — SPONGE GZ W4XL8IN COT WVN 12 PLY

## (undated) DEVICE — Z DISCONTINUED USE 2429233 DRESSING FOAM W10XL10CM 5 LAYR SELF ADH VERSATILE SAFETAC

## (undated) DEVICE — BLANKET WRM W29.9XL79.1IN UP BODY FORC AIR MISTRAL-AIR

## (undated) DEVICE — FORCEPS BX L240CM JAW DIA22MM ORNG STD CAP W NDL RAD JAW 4

## (undated) DEVICE — 3M™ STERI-DRAPE™ INSTRUMENT POUCH 1018: Brand: STERI-DRAPE™

## (undated) DEVICE — CUFF RESTRN WRST OR ANK 45FT AD FOAM

## (undated) DEVICE — TUBING FLD MGMT Y DBL SPIK DUALWAVE

## (undated) DEVICE — TRANSPORT CANNULA 8MM LENGTH 7, 8, 9: Brand: TRANSPORT

## (undated) DEVICE — PERINEAL POST PAD 1

## (undated) DEVICE — TUBING, SUCTION, 1/4" X 12', STRAIGHT: Brand: MEDLINE

## (undated) DEVICE — TUBING PMP L6FT CONT WAVE EXTN